# Patient Record
Sex: FEMALE | Race: WHITE | NOT HISPANIC OR LATINO | Employment: FULL TIME | URBAN - METROPOLITAN AREA
[De-identification: names, ages, dates, MRNs, and addresses within clinical notes are randomized per-mention and may not be internally consistent; named-entity substitution may affect disease eponyms.]

---

## 2018-04-30 LAB — CRC RECOMMENDATION EXT: NORMAL

## 2022-01-04 DIAGNOSIS — Z00.00 ROUTINE GENERAL MEDICAL EXAMINATION AT A HEALTH CARE FACILITY: Primary | ICD-10-CM

## 2022-01-11 ENCOUNTER — HOSPITAL ENCOUNTER (OUTPATIENT)
Dept: CARDIOLOGY | Facility: HOSPITAL | Age: 52
Discharge: HOME | End: 2022-01-11
Attending: INTERNAL MEDICINE

## 2022-01-11 ENCOUNTER — HOSPITAL ENCOUNTER (OUTPATIENT)
Dept: RADIOLOGY | Facility: HOSPITAL | Age: 52
Discharge: HOME | End: 2022-01-11
Attending: INTERNAL MEDICINE

## 2022-01-11 ENCOUNTER — CLINICAL SUPPORT (OUTPATIENT)
Dept: INTERNAL MEDICINE | Facility: CLINIC | Age: 52
End: 2022-01-11

## 2022-01-11 ENCOUNTER — OFFICE VISIT (OUTPATIENT)
Dept: INTERNAL MEDICINE | Facility: CLINIC | Age: 52
End: 2022-01-11

## 2022-01-11 VITALS
SYSTOLIC BLOOD PRESSURE: 128 MMHG | BODY MASS INDEX: 19.88 KG/M2 | HEIGHT: 62 IN | HEART RATE: 60 BPM | WEIGHT: 108 LBS | DIASTOLIC BLOOD PRESSURE: 82 MMHG | TEMPERATURE: 97 F

## 2022-01-11 DIAGNOSIS — Z12.31 ENCOUNTER FOR SCREENING MAMMOGRAM FOR BREAST CANCER: ICD-10-CM

## 2022-01-11 DIAGNOSIS — Z00.00 ROUTINE GENERAL MEDICAL EXAMINATION AT A HEALTH CARE FACILITY: Primary | ICD-10-CM

## 2022-01-11 DIAGNOSIS — Z00.00 ROUTINE GENERAL MEDICAL EXAMINATION AT A HEALTH CARE FACILITY: ICD-10-CM

## 2022-01-11 LAB
ALBUMIN SERPL BCP-MCNC: 4 G/DL (ref 3.5–5.2)
ALP SERPL-CCNC: 59 U/L (ref 55–135)
ALT SERPL W/O P-5'-P-CCNC: 19 U/L (ref 10–44)
ANION GAP SERPL CALC-SCNC: 9 MMOL/L (ref 8–16)
AST SERPL-CCNC: 23 U/L (ref 10–40)
BILIRUB SERPL-MCNC: 0.7 MG/DL (ref 0.1–1)
BUN SERPL-MCNC: 16 MG/DL (ref 6–20)
CALCIUM SERPL-MCNC: 9.5 MG/DL (ref 8.7–10.5)
CHLORIDE SERPL-SCNC: 101 MMOL/L (ref 95–110)
CHOLEST SERPL-MCNC: 181 MG/DL (ref 120–199)
CHOLEST/HDLC SERPL: 2.6 {RATIO} (ref 2–5)
CO2 SERPL-SCNC: 29 MMOL/L (ref 23–29)
CREAT SERPL-MCNC: 0.8 MG/DL (ref 0.5–1.4)
ERYTHROCYTE [DISTWIDTH] IN BLOOD BY AUTOMATED COUNT: 12.2 % (ref 11.5–14.5)
EST. GFR  (AFRICAN AMERICAN): >60 ML/MIN/1.73 M^2
EST. GFR  (NON AFRICAN AMERICAN): >60 ML/MIN/1.73 M^2
ESTIMATED AVG GLUCOSE: 103 MG/DL (ref 68–131)
GLUCOSE SERPL-MCNC: 73 MG/DL (ref 70–110)
HBA1C MFR BLD: 5.2 % (ref 4–5.6)
HCT VFR BLD AUTO: 41.8 % (ref 37–48.5)
HDLC SERPL-MCNC: 69 MG/DL (ref 40–75)
HDLC SERPL: 38.1 % (ref 20–50)
HGB BLD-MCNC: 14.2 G/DL (ref 12–16)
LDLC SERPL CALC-MCNC: 91.8 MG/DL (ref 63–159)
MCH RBC QN AUTO: 31.1 PG (ref 27–31)
MCHC RBC AUTO-ENTMCNC: 34 G/DL (ref 32–36)
MCV RBC AUTO: 92 FL (ref 82–98)
NONHDLC SERPL-MCNC: 112 MG/DL
PLATELET # BLD AUTO: 277 K/UL (ref 150–450)
PMV BLD AUTO: 9.6 FL (ref 9.2–12.9)
POTASSIUM SERPL-SCNC: 3.7 MMOL/L (ref 3.5–5.1)
PROT SERPL-MCNC: 6.9 G/DL (ref 6–8.4)
RBC # BLD AUTO: 4.57 M/UL (ref 4–5.4)
SODIUM SERPL-SCNC: 139 MMOL/L (ref 136–145)
TRIGL SERPL-MCNC: 101 MG/DL (ref 30–150)
TSH SERPL DL<=0.005 MIU/L-ACNC: 1.47 UIU/ML (ref 0.4–4)
WBC # BLD AUTO: 6.4 K/UL (ref 3.9–12.7)

## 2022-01-11 PROCEDURE — 99386 PREV VISIT NEW AGE 40-64: CPT | Mod: S$PBB,,, | Performed by: INTERNAL MEDICINE

## 2022-01-11 PROCEDURE — 99214 OFFICE O/P EST MOD 30 MIN: CPT | Mod: PBBFAC,25 | Performed by: INTERNAL MEDICINE

## 2022-01-11 PROCEDURE — 71046 XR CHEST PA AND LATERAL: ICD-10-PCS | Mod: 26,,, | Performed by: RADIOLOGY

## 2022-01-11 PROCEDURE — 80061 LIPID PANEL: CPT | Performed by: FAMILY MEDICINE

## 2022-01-11 PROCEDURE — 93010 EKG 12-LEAD: ICD-10-PCS | Mod: ,,, | Performed by: INTERNAL MEDICINE

## 2022-01-11 PROCEDURE — 93010 ELECTROCARDIOGRAM REPORT: CPT | Mod: ,,, | Performed by: INTERNAL MEDICINE

## 2022-01-11 PROCEDURE — 84443 ASSAY THYROID STIM HORMONE: CPT | Performed by: FAMILY MEDICINE

## 2022-01-11 PROCEDURE — 87389 HIV-1 AG W/HIV-1&-2 AB AG IA: CPT | Performed by: FAMILY MEDICINE

## 2022-01-11 PROCEDURE — 99386 PR PREVENTIVE VISIT,NEW,40-64: ICD-10-PCS | Mod: S$PBB,,, | Performed by: INTERNAL MEDICINE

## 2022-01-11 PROCEDURE — 99999 PR PBB SHADOW E&M-EST. PATIENT-LVL IV: ICD-10-PCS | Mod: PBBFAC,,, | Performed by: INTERNAL MEDICINE

## 2022-01-11 PROCEDURE — 83036 HEMOGLOBIN GLYCOSYLATED A1C: CPT | Performed by: FAMILY MEDICINE

## 2022-01-11 PROCEDURE — 93005 ELECTROCARDIOGRAM TRACING: CPT

## 2022-01-11 PROCEDURE — 71046 X-RAY EXAM CHEST 2 VIEWS: CPT | Mod: TC

## 2022-01-11 PROCEDURE — 80053 COMPREHEN METABOLIC PANEL: CPT | Performed by: FAMILY MEDICINE

## 2022-01-11 PROCEDURE — 86803 HEPATITIS C AB TEST: CPT | Performed by: FAMILY MEDICINE

## 2022-01-11 PROCEDURE — 99999 PR PBB SHADOW E&M-EST. PATIENT-LVL IV: CPT | Mod: PBBFAC,,, | Performed by: INTERNAL MEDICINE

## 2022-01-11 PROCEDURE — 71046 X-RAY EXAM CHEST 2 VIEWS: CPT | Mod: 26,,, | Performed by: RADIOLOGY

## 2022-01-11 PROCEDURE — 85027 COMPLETE CBC AUTOMATED: CPT | Performed by: FAMILY MEDICINE

## 2022-01-11 RX ORDER — CETIRIZINE HYDROCHLORIDE 10 MG/1
10 TABLET ORAL DAILY
COMMUNITY
End: 2023-11-09

## 2022-01-11 RX ORDER — VALACYCLOVIR HYDROCHLORIDE 1 G/1
2000 TABLET, FILM COATED ORAL
COMMUNITY
Start: 2021-09-03 | End: 2022-08-02

## 2022-01-11 RX ORDER — METHYLPHENIDATE HYDROCHLORIDE 10 MG/1
10 TABLET ORAL 2 TIMES DAILY
COMMUNITY
Start: 2022-01-08 | End: 2022-01-31 | Stop reason: SDUPTHER

## 2022-01-11 RX ORDER — DOXYCYCLINE HYCLATE 75 MG/1
1 TABLET ORAL DAILY
COMMUNITY
Start: 2022-01-06 | End: 2022-05-10

## 2022-01-11 RX ORDER — RIZATRIPTAN BENZOATE 10 MG/1
10 TABLET, ORALLY DISINTEGRATING ORAL
COMMUNITY
Start: 2021-09-07 | End: 2022-07-11 | Stop reason: SDUPTHER

## 2022-01-11 RX ORDER — PROPRANOLOL HYDROCHLORIDE 10 MG/1
10 TABLET ORAL 2 TIMES DAILY PRN
COMMUNITY
Start: 2022-01-05 | End: 2022-02-03 | Stop reason: SDUPTHER

## 2022-01-11 RX ORDER — SULFACETAMIDE SODIUM AND SULFUR 100; 20 MG/G; MG/G
CREAM TOPICAL
COMMUNITY
Start: 2021-08-23 | End: 2022-05-10

## 2022-01-11 RX ORDER — ONDANSETRON HYDROCHLORIDE 8 MG/1
1 TABLET, FILM COATED ORAL EVERY 8 HOURS PRN
COMMUNITY
Start: 2021-04-28 | End: 2022-08-02 | Stop reason: SDUPTHER

## 2022-01-11 RX ORDER — (CALCIUM, MAGNESIUM, POTASSIUM, AND SODIUM OXYBATES) .5; .5; .5; .5 G/ML; G/ML; G/ML; G/ML
3.75 SOLUTION ORAL 2 TIMES DAILY
COMMUNITY
Start: 2021-12-21

## 2022-01-11 RX ORDER — FLUOXETINE 20 MG/1
20 TABLET ORAL DAILY
COMMUNITY
Start: 2022-01-03 | End: 2022-02-03 | Stop reason: SDUPTHER

## 2022-01-11 NOTE — PROGRESS NOTES
"Subjective:      Patient ID: Kayla Lowery is a 51 y.o. female.    Chief Complaint: Executive Health    HPI     It was a pleasure to meet you for your executive health physical.    Your sleep doctor is Dr. Wilson.    Your a 51-year-old with a past medical history of attention deficit disorder, anxiety, hypertension, migraines, narcolepsy, and rosacea, fever blisters.     Your current medications include Valtrex, Maxalt, propranolol, Zofran, Ritalin, fluoxetine, doxycycline, Zyrtec, xywav.    You never smoked.     You have experienced side effects with :  Codeine - nausea  Minocycline  - Vertigo  Doxycycline - Sun sensitivity.   Tobramycin ophthalmic - burning to eyes.         Your family history includes:  Father - unknown health history  Mother - Diabetes, hyperlipidemia, hypertension, parkinson, scoliosis  Siblings - unknown health history  Son - narcolepsy, dyspraxia, dyslexia     Preventative healthcare:  Flu vaccine - reported up to date  Tetanus vaccine - reported up to date  Check with your pharmacy regarding new shingles vaccine.   COVID vaccine - reported up to date  Colonoscopy - reported up to date  Mammogram - ordered  Gyn exam - referral placed.     Review of Systems   Constitutional: Negative for chills and fever.   HENT: Negative for ear pain and sore throat.    Respiratory: Negative for cough.    Cardiovascular: Negative for chest pain.   Gastrointestinal: Negative for abdominal pain and blood in stool.   Genitourinary: Negative for dysuria and hematuria.   Neurological: Negative for seizures and syncope.     Objective:   /82   Pulse 60   Temp 96.7 °F (35.9 °C) (Tympanic)   Ht 5' 2" (1.575 m)   Wt 49 kg (108 lb 0.4 oz)   BMI 19.76 kg/m²     Physical Exam  Constitutional:       General: She is not in acute distress.     Appearance: She is well-developed and well-nourished.   HENT:      Head: Normocephalic and atraumatic.      Mouth/Throat:      Mouth: Oropharynx is clear and moist. "   Eyes:      Extraocular Movements: EOM normal.      Pupils: Pupils are equal, round, and reactive to light.   Neck:      Thyroid: No thyromegaly.      Vascular: No carotid bruit.   Cardiovascular:      Rate and Rhythm: Normal rate and regular rhythm.   Pulmonary:      Breath sounds: Normal breath sounds. No wheezing or rales.   Abdominal:      General: Bowel sounds are normal.      Palpations: Abdomen is soft.      Tenderness: There is no abdominal tenderness.   Musculoskeletal:      Cervical back: Neck supple.   Lymphadenopathy:      Cervical: No cervical adenopathy.   Skin:     General: Skin is warm and dry.   Neurological:      Mental Status: She is alert and oriented to person, place, and time.   Psychiatric:         Mood and Affect: Mood and affect and mood normal.         Behavior: Behavior normal.         Assessment:     1. Routine general medical examination at a health care facility    2. Encounter for screening for malignant neoplasm of prostate    3. Encounter for screening mammogram for breast cancer      Plan:   Routine general medical examination at a health care facility  -     Ambulatory referral/consult to Dermatology; Future; Expected date: 01/18/2022    Encounter for screening for malignant neoplasm of prostate    Encounter for screening mammogram for breast cancer  -     Mammo Digital Screening Bilat w/ Jewel; Future; Expected date: 01/11/2022        Heart healthy diet and reg exercise  HM reviewed  See exec health letter for details.     Problem List Items Addressed This Visit    None     Visit Diagnoses     Routine general medical examination at a health care facility    -  Primary    Relevant Orders    Ambulatory referral/consult to Dermatology    Encounter for screening for malignant neoplasm of prostate        Encounter for screening mammogram for breast cancer        Relevant Orders    Mammo Digital Screening Bilat w/ Jewel          No follow-ups on file.

## 2022-01-12 LAB
HCV AB SERPL QL IA: NEGATIVE
HIV 1+2 AB+HIV1 P24 AG SERPL QL IA: NEGATIVE

## 2022-01-14 ENCOUNTER — PATIENT MESSAGE (OUTPATIENT)
Dept: INTERNAL MEDICINE | Facility: CLINIC | Age: 52
End: 2022-01-14

## 2022-01-28 ENCOUNTER — PATIENT MESSAGE (OUTPATIENT)
Dept: INTERNAL MEDICINE | Facility: CLINIC | Age: 52
End: 2022-01-28
Payer: COMMERCIAL

## 2022-01-28 DIAGNOSIS — R06.89: Primary | ICD-10-CM

## 2022-01-28 DIAGNOSIS — G47.419 PRIMARY NARCOLEPSY WITHOUT CATAPLEXY: ICD-10-CM

## 2022-01-31 ENCOUNTER — PATIENT MESSAGE (OUTPATIENT)
Dept: INTERNAL MEDICINE | Facility: CLINIC | Age: 52
End: 2022-01-31
Payer: COMMERCIAL

## 2022-01-31 RX ORDER — METHYLPHENIDATE HYDROCHLORIDE 10 MG/1
10 TABLET ORAL 2 TIMES DAILY
Qty: 60 TABLET | Refills: 0 | Status: SHIPPED | OUTPATIENT
Start: 2022-01-31 | End: 2022-03-16 | Stop reason: SDUPTHER

## 2022-01-31 RX ORDER — METHYLPHENIDATE HYDROCHLORIDE 18 MG/1
18 TABLET ORAL EVERY MORNING
Qty: 30 TABLET | Refills: 0 | Status: SHIPPED | OUTPATIENT
Start: 2022-01-31 | End: 2022-02-01 | Stop reason: SDUPTHER

## 2022-02-01 ENCOUNTER — OFFICE VISIT (OUTPATIENT)
Dept: DERMATOLOGY | Facility: CLINIC | Age: 52
End: 2022-02-01
Payer: COMMERCIAL

## 2022-02-01 DIAGNOSIS — G47.419 PRIMARY NARCOLEPSY WITHOUT CATAPLEXY: ICD-10-CM

## 2022-02-01 DIAGNOSIS — L71.9 ROSACEA: Primary | ICD-10-CM

## 2022-02-01 PROCEDURE — 99213 OFFICE O/P EST LOW 20 MIN: CPT | Mod: PBBFAC,PO | Performed by: DERMATOLOGY

## 2022-02-01 PROCEDURE — 99999 PR PBB SHADOW E&M-EST. PATIENT-LVL III: CPT | Mod: PBBFAC,,, | Performed by: DERMATOLOGY

## 2022-02-01 PROCEDURE — 99204 PR OFFICE/OUTPT VISIT, NEW, LEVL IV, 45-59 MIN: ICD-10-PCS | Mod: S$GLB,,, | Performed by: DERMATOLOGY

## 2022-02-01 PROCEDURE — 99204 OFFICE O/P NEW MOD 45 MIN: CPT | Mod: S$GLB,,, | Performed by: DERMATOLOGY

## 2022-02-01 PROCEDURE — 99999 PR PBB SHADOW E&M-EST. PATIENT-LVL III: ICD-10-PCS | Mod: PBBFAC,,, | Performed by: DERMATOLOGY

## 2022-02-01 RX ORDER — DOXYCYCLINE 100 MG/1
TABLET ORAL
Qty: 45 TABLET | Refills: 2 | Status: SHIPPED | OUTPATIENT
Start: 2022-02-01 | End: 2022-05-10

## 2022-02-01 NOTE — TELEPHONE ENCOUNTER
No new care gaps identified.  Powered by Tul[employer] by [employer]. Reference number: [id number].   2/01/2022 4:50:28 PM CST

## 2022-02-01 NOTE — PROGRESS NOTES
Subjective:       Patient ID:  Kayla Lowery is a 51 y.o. female who presents for   History of Present Illness: The patient presents with chief complaint of face rash.  Location: cheeks (R>L), nose, chin, forehead  Duration: decades; current flare since October 2021 after trip to [facility] and wore n95 on face  Signs/Symptoms: redness, burning, itching, swelling, scaling, sometimes bumps; reports significant component of swelling/induration at times    Previously seen by outside derm for rosacea. + ocular rosacea and eyelid dermatitis.  Avoiding all triggers  Has not ever been patch tested or biopsied but reports it was discussed    Current treatment   Restarted doxy 75 mg daily in November 2021- tolerating well; previously had photosensitive blistering rash on helices + cervical LAD and GI upset thought to be 2/2 doxy   Mirvaso qam - reports it works well when used  Daily SPF 50   La roche posay toleriane hydrating gentle cleanser  Coconut oil    Prior treatments:  Rhofade qam  Soolantra qpm - did not burn, interested in trying again  topical metronidazole- has been years since used  topical clinda - has been years since used  SSS cream samples - reports it burns  Bactrim in the past  minocycline - reports it causes vertigo  oracea samples  Azelaic acid - has been years, interested in trying again          Review of Systems   Eyes: Positive for eye irritation and eyelid inflammation.   Skin: Positive for itching, rash, dry skin, sun sensitivity, daily sunscreen use and activity-related sunscreen use.        Objective:    Physical Exam   Constitutional: She appears well-developed and well-nourished. No distress.   Neurological: She is alert and oriented to person, place, and time. She is not disoriented.   Psychiatric: She has a normal mood and affect.   Skin:   Areas Examined (abnormalities noted in diagram):   Head / Face Inspection Performed  Nails and Digits Inspection Performed                  Diagram  Legend     Erythematous scaling macule/papule c/w actinic keratosis       Vascular papule c/w angioma      Pigmented verrucoid papule/plaque c/w seborrheic keratosis      Yellow umbilicated papule c/w sebaceous hyperplasia      Irregularly shaped tan macule c/w lentigo     1-2 mm smooth white papules consistent with Milia      Movable subcutaneous cyst with punctum c/w epidermal inclusion cyst      Subcutaneous movable cyst c/w pilar cyst      Firm pink to brown papule c/w dermatofibroma      Pedunculated fleshy papule(s) c/w skin tag(s)      Evenly pigmented macule c/w junctional nevus     Mildly variegated pigmented, slightly irregular-bordered macule c/w mildly atypical nevus      Flesh colored to evenly pigmented papule c/w intradermal nevus       Pink pearly papule/plaque c/w basal cell carcinoma      Erythematous hyperkeratotic cursted plaque c/w SCC      Surgical scar with no sign of skin cancer recurrence      Open and closed comedones      Inflammatory papules and pustules      Verrucoid papule consistent consistent with wart     Erythematous eczematous patches and plaques     Dystrophic onycholytic nail with subungual debris c/w onychomycosis     Umbilicated papule    Erythematous-base heme-crusted tan verrucoid plaque consistent with inflamed seborrheic keratosis     Erythematous Silvery Scaling Plaque c/w Psoriasis     See annotation      Assessment / Plan:        Rosacea  - UC, severe, refractory to doxy 75 mg daily and many topicals in the past; with concern for concomitant ACD.  - set up for patch testing, start 1-2 week course of hydrocortisone 2.5% cream BID (already has at home), increase doxy to 100 mg BID for 2 weeks, and start SkinMedicinals Triple cream  - recommend Vanicream products for cleansing and moisturizing; continue strict daily SPF with physical-only blocker (zinc/titanium)  -     doxycycline monohydrate 100 mg Tab; Take 1 tablet by mouth twice daily for 2 weeks, then decrease to one  tablet daily. Take with food and water, avoid lying down for 1 hour after taking. Avoid the sun.  Dispense: 45 tablet; Refill: 2  -     Patch Testing; Future  - consider checking ROCHELLE, biopsy to eval for CTD  - consider low dose isotretinoin    - doxycycline: Side effect profile of doxy reviewed including increased sun sensitivity and upset stomach, esophageal inflammation.  Patient was instructed to take with food and water and to avoid lying down for 1 hour after taking. Patient was instructed to not become pregnant while on medication to effects on dental development in fetus; she acknowledged understanding of risks involved.              Azelaic Acid: 15%  Ivermectin: 1%  Metronidazole: 1%  Vehicle: Cream  4    F/u next week for patch testing          LOS NUMBER AND COMPLEXITY OF PROBLEMS    COMPLEXITY OF DATA RISK TOTAL TIME (m)   65657  86992 [] 1 self-limited or minor problem [x] Minimal to none [] No treatment recommended or patient to monitor. Reassurance.  15-29  10-19   02921  68269 Low  [] 2 or more self limited or minor problems  [] 1 stable chronic illness  [] 1 acute, uncomplicated illness or injury Limited (2)  [] Prior external notes from each unique source  [] Review result of each unique test  [] Order each unique test  OR [] Independent historian Low  []  OTC medications   []  Discussed/Decision for minor skin surgery (no risk factors) 30-44 20-29   77656  93204 Moderate  [x]  1 or more chronic unstable illness (not at goal or progression or exacerbation) or SE of treatment  []  2 or more stable chronic illnesses  []  1 acute illness with systemic symptoms  []  1 acute complicated injury  []  1 undiagnosed new problem with uncertain prognosis Moderate (1/3 below)  []  3 or more data items        *Now includes independent historian  []  Independent interpretation of a test  []  Discuss management/test with another provider Moderate  [x]  Prescription drug mgmt  []  Discussed/Decision for Minor  surgery with risk factors  []  Mgmt limited by social determinates 45-59  30-39   07958  01016 High  []  1 or more chronic illness with severe exacerbation, progression or SE of treatment  []  1 acute or chronic illness/injury that poses a threat to life or bodily function Extensive (2/3 below)  []  3 or more data items        *Now includes independent historian.  []  Independent interpretation of a test  []  Discuss management/test with another provider High  []  Major surgery with risk discussed  []  Drug therapy requiring intensive monitoring for toxicity  []  Hospitalization  []  Decision for DNR 60-74  40-54

## 2022-02-02 RX ORDER — METHYLPHENIDATE HYDROCHLORIDE 18 MG/1
18 TABLET ORAL EVERY MORNING
Qty: 30 TABLET | Refills: 0 | Status: SHIPPED | OUTPATIENT
Start: 2022-02-02 | End: 2022-03-04 | Stop reason: SDUPTHER

## 2022-02-03 RX ORDER — FLUOXETINE 20 MG/1
20 TABLET ORAL DAILY
Qty: 90 TABLET | Refills: 1 | Status: SHIPPED | OUTPATIENT
Start: 2022-02-03 | End: 2022-08-02 | Stop reason: SDUPTHER

## 2022-02-03 RX ORDER — PROPRANOLOL HYDROCHLORIDE 10 MG/1
10 TABLET ORAL 2 TIMES DAILY PRN
Qty: 90 TABLET | Refills: 1 | Status: SHIPPED | OUTPATIENT
Start: 2022-02-03 | End: 2022-05-31 | Stop reason: SDUPTHER

## 2022-02-03 NOTE — TELEPHONE ENCOUNTER
No new care gaps identified.  Powered by [employer] by [employer]. Reference number: [id number].   2/03/2022 8:35:41 AM CST

## 2022-02-07 ENCOUNTER — CLINICAL SUPPORT (OUTPATIENT)
Dept: DERMATOLOGY | Facility: CLINIC | Age: 52
End: 2022-02-07
Payer: COMMERCIAL

## 2022-02-07 ENCOUNTER — PATIENT MESSAGE (OUTPATIENT)
Dept: INTERNAL MEDICINE | Facility: CLINIC | Age: 52
End: 2022-02-07
Payer: COMMERCIAL

## 2022-02-07 VITALS — DIASTOLIC BLOOD PRESSURE: 68 MMHG | SYSTOLIC BLOOD PRESSURE: 168 MMHG

## 2022-02-07 DIAGNOSIS — L71.9 ROSACEA: Primary | ICD-10-CM

## 2022-02-07 PROCEDURE — 99999 PR PBB SHADOW E&M-EST. PATIENT-LVL III: ICD-10-PCS | Mod: PBBFAC,,,

## 2022-02-07 PROCEDURE — 99999 PR PBB SHADOW E&M-EST. PATIENT-LVL III: CPT | Mod: PBBFAC,,,

## 2022-02-07 PROCEDURE — 99213 OFFICE O/P EST LOW 20 MIN: CPT | Mod: PBBFAC,PO

## 2022-02-08 NOTE — TELEPHONE ENCOUNTER
I have never completed Network exception forms. I  can look at form to see if I can be of help. She can also provide anything from Dr. Wilson she feels would be helpful. I see she has Dr. Ahumada as her primary care provider. Dr. ahumada's office is on [facility]. Is she planning to make an appt with Dr. Ahumada?

## 2022-02-09 ENCOUNTER — CLINICAL SUPPORT (OUTPATIENT)
Dept: DERMATOLOGY | Facility: CLINIC | Age: 52
End: 2022-02-09
Payer: COMMERCIAL

## 2022-02-09 ENCOUNTER — PATIENT MESSAGE (OUTPATIENT)
Dept: DERMATOLOGY | Facility: CLINIC | Age: 52
End: 2022-02-09

## 2022-02-09 DIAGNOSIS — L71.9 ROSACEA: Primary | ICD-10-CM

## 2022-02-09 PROCEDURE — 99999 PR PBB SHADOW E&M-EST. PATIENT-LVL III: ICD-10-PCS | Mod: PBBFAC,,,

## 2022-02-09 PROCEDURE — 99999 PR PBB SHADOW E&M-EST. PATIENT-LVL III: CPT | Mod: PBBFAC,,,

## 2022-02-09 NOTE — PROGRESS NOTES
Patch testing was performed on Kayla L Lowery using standard technique. Tests were read after 48 hours.     Interpretation:  5,17, and 29    Test Results       Test Administration Information

## 2022-02-10 ENCOUNTER — PATIENT MESSAGE (OUTPATIENT)
Dept: INTERNAL MEDICINE | Facility: CLINIC | Age: 52
End: 2022-02-10
Payer: COMMERCIAL

## 2022-02-11 ENCOUNTER — TELEPHONE (OUTPATIENT)
Dept: INTERNAL MEDICINE | Facility: CLINIC | Age: 52
End: 2022-02-11
Payer: COMMERCIAL

## 2022-02-11 ENCOUNTER — CLINICAL SUPPORT (OUTPATIENT)
Dept: DERMATOLOGY | Facility: CLINIC | Age: 52
End: 2022-02-11
Payer: COMMERCIAL

## 2022-02-11 ENCOUNTER — PATIENT MESSAGE (OUTPATIENT)
Dept: INTERNAL MEDICINE | Facility: CLINIC | Age: 52
End: 2022-02-11
Payer: COMMERCIAL

## 2022-02-11 ENCOUNTER — TELEPHONE (OUTPATIENT)
Dept: PAIN MEDICINE | Facility: CLINIC | Age: 52
End: 2022-02-11
Payer: COMMERCIAL

## 2022-02-11 ENCOUNTER — OFFICE VISIT (OUTPATIENT)
Dept: DERMATOLOGY | Facility: CLINIC | Age: 52
End: 2022-02-11
Payer: COMMERCIAL

## 2022-02-11 DIAGNOSIS — L71.9 ROSACEA: Primary | ICD-10-CM

## 2022-02-11 DIAGNOSIS — L23.9 ALLERGIC CONTACT DERMATITIS, UNSPECIFIED TRIGGER: ICD-10-CM

## 2022-02-11 PROCEDURE — 99999 PR PBB SHADOW E&M-EST. PATIENT-LVL II: CPT | Mod: PBBFAC,,, | Performed by: DERMATOLOGY

## 2022-02-11 PROCEDURE — 1159F MED LIST DOCD IN RCRD: CPT | Mod: CPTII,S$GLB,, | Performed by: DERMATOLOGY

## 2022-02-11 PROCEDURE — 99214 PR OFFICE/OUTPT VISIT, EST, LEVL IV, 30-39 MIN: ICD-10-PCS | Mod: S$GLB,,, | Performed by: DERMATOLOGY

## 2022-02-11 PROCEDURE — 1160F PR REVIEW ALL MEDS BY PRESCRIBER/CLIN PHARMACIST DOCUMENTED: ICD-10-PCS | Mod: CPTII,S$GLB,, | Performed by: DERMATOLOGY

## 2022-02-11 PROCEDURE — 99214 OFFICE O/P EST MOD 30 MIN: CPT | Mod: S$GLB,,, | Performed by: DERMATOLOGY

## 2022-02-11 PROCEDURE — 99999 PR PBB SHADOW E&M-EST. PATIENT-LVL II: ICD-10-PCS | Mod: PBBFAC,,, | Performed by: DERMATOLOGY

## 2022-02-11 PROCEDURE — 1160F RVW MEDS BY RX/DR IN RCRD: CPT | Mod: CPTII,S$GLB,, | Performed by: DERMATOLOGY

## 2022-02-11 PROCEDURE — 1159F PR MEDICATION LIST DOCUMENTED IN MEDICAL RECORD: ICD-10-PCS | Mod: CPTII,S$GLB,, | Performed by: DERMATOLOGY

## 2022-02-11 PROCEDURE — 3044F HG A1C LEVEL LT 7.0%: CPT | Mod: CPTII,S$GLB,, | Performed by: DERMATOLOGY

## 2022-02-11 PROCEDURE — 3044F PR MOST RECENT HEMOGLOBIN A1C LEVEL <7.0%: ICD-10-PCS | Mod: CPTII,S$GLB,, | Performed by: DERMATOLOGY

## 2022-02-11 NOTE — TELEPHONE ENCOUNTER
----- Message from Regi Moreno sent at 2/11/2022  4:18 PM CST -----  Contact: Kayla Garza would like a call back, please call her at [phone] or [phone]

## 2022-02-11 NOTE — PROGRESS NOTES
Subjective:       Patient ID:  Kayla Lowery is a 51 y.o. female who presents for No chief complaint on file.    Last seen 2/1/22 for initial eval of severe rosacea flare +/- ACD (see below)- continued Mirvaso daily, started 1-2 week course of hydrocortisone 2.5% cream BID (already has at home), increased doxy to 100 mg BID for 2 weeks, and started SkinMedicinals Triple cream, Vanicream products for cleansing and moisturizing, strict daily SPF with physical-only blocker (zinc/titanium), and set up for patch testing.      Here today for final patch test read.  Notes overall improvement but still with some red scaly itchy/burning patches on the cheeks, chin, forehead.    MA noted positives to 5, 17, 29 on initial read Wednesday.      HPI from initial eval 2/1/22:  History of Present Illness: The patient presents with chief complaint of face rash.  Location: cheeks (R>L), nose, chin, forehead  Duration: decades; current flare since October 2021 after trip to [facility] and wore n95 on face  Signs/Symptoms: redness, burning, itching, swelling, scaling, sometimes bumps; reports significant component of swelling/induration at times    Previously seen by outside derm for rosacea. + ocular rosacea and eyelid dermatitis.  Avoiding all triggers  Has not ever been patch tested or biopsied but reports it was discussed    Current treatment   Restarted doxy 75 mg daily in November 2021- tolerating well; previously had photosensitive blistering rash on helices + cervical LAD and GI upset thought to be 2/2 doxy   Mirvaso qam - reports it works well when used  Daily SPF 50   La roche posay toleriane hydrating gentle cleanser  Coconut oil    Prior treatments:  Rhofade qam  Soolantra qpm - did not burn, interested in trying again  topical metronidazole- has been years since used  topical clinda - has been years since used  SSS cream samples - reports it burns  Bactrim in the past  minocycline - reports it causes vertigo  oracea  samples  Azelaic acid - has been years, interested in trying again          Review of Systems   Eyes: Positive for eye irritation and eyelid inflammation.   Skin: Positive for itching, rash, dry skin, sun sensitivity, daily sunscreen use and activity-related sunscreen use.        Objective:    Physical Exam   Constitutional: She appears well-developed and well-nourished. No distress.   Neurological: She is alert and oriented to person, place, and time. She is not disoriented.   Psychiatric: She has a normal mood and affect.   Skin:   Areas Examined (abnormalities noted in diagram):   Head / Face Inspection Performed  Nails and Digits Inspection Performed                  Diagram Legend     Erythematous scaling macule/papule c/w actinic keratosis       Vascular papule c/w angioma      Pigmented verrucoid papule/plaque c/w seborrheic keratosis      Yellow umbilicated papule c/w sebaceous hyperplasia      Irregularly shaped tan macule c/w lentigo     1-2 mm smooth white papules consistent with Milia      Movable subcutaneous cyst with punctum c/w epidermal inclusion cyst      Subcutaneous movable cyst c/w pilar cyst      Firm pink to brown papule c/w dermatofibroma      Pedunculated fleshy papule(s) c/w skin tag(s)      Evenly pigmented macule c/w junctional nevus     Mildly variegated pigmented, slightly irregular-bordered macule c/w mildly atypical nevus      Flesh colored to evenly pigmented papule c/w intradermal nevus       Pink pearly papule/plaque c/w basal cell carcinoma      Erythematous hyperkeratotic cursted plaque c/w SCC      Surgical scar with no sign of skin cancer recurrence      Open and closed comedones      Inflammatory papules and pustules      Verrucoid papule consistent consistent with wart     Erythematous eczematous patches and plaques     Dystrophic onycholytic nail with subungual debris c/w onychomycosis     Umbilicated papule    Erythematous-base heme-crusted tan verrucoid plaque consistent  with inflamed seborrheic keratosis     Erythematous Silvery Scaling Plaque c/w Psoriasis     See annotation      Assessment / Plan:        Rosacea  - UC, severe, refractory to doxy 75 mg daily and many topicals in the past; with concern for concomitant ACD (final patch read today - see below)  - overall improved but still symptomatic after 10 days of increased doxy to 100 mg BID and hydrocortisone 2.5 % cream BID. Complete full 2 weeks of this, then decrease doxy to 100 mg daily and stop hydrocortisone. Continue Mirvaso, SkinMed Triple cream, Vanicream products for cleansing/moisturizing, strict daily SPF.  Ensure all products are on Andreas safe list (see below).  - consider checking ROCHELLE, biopsy to eval for CTD  - consider Seysara, low dose isotretinoin     - doxycycline: Side effect profile of doxy reviewed including increased sun sensitivity and upset stomach, esophageal inflammation.  Patient was instructed to take with food and water and to avoid lying down for 1 hour after taking. Patient was instructed to not become pregnant while on medication to effects on dental development in fetus; she acknowledged understanding of risks involved.             Allergic contact dermatitis, unspecified trigger  - irritant reactions noted on initial read to imidazolidinyl urea;  Cl +ME - isothiazolinone, and jac mix  - positive allergic reaction today noted only to gold sodium thiosulfate (+)  - discussed and gave printed info on both irritants and allergen  - sent email with [employer] safe list pdf            Follow up in about 8-12 weeks

## 2022-02-11 NOTE — TELEPHONE ENCOUNTER
Calling on behalf of Edna Pearson because pt is a special need pt.  I inform her that is out of clinic today and wont return until Monday. I will send a message to  staff box so he can recive refill he gabapentin (NEURONTIN) 300 MG. Pt repasenative also stated the she did not pickup her las Michael refill in 1/26/2022 because pt ws to late in picking I tupFrank [place]   Medical Assistant

## 2022-02-15 ENCOUNTER — PATIENT MESSAGE (OUTPATIENT)
Dept: DERMATOLOGY | Facility: CLINIC | Age: 52
End: 2022-02-15
Payer: COMMERCIAL

## 2022-02-16 ENCOUNTER — PATIENT MESSAGE (OUTPATIENT)
Dept: DERMATOLOGY | Facility: CLINIC | Age: 52
End: 2022-02-16
Payer: COMMERCIAL

## 2022-02-16 DIAGNOSIS — L71.9 ROSACEA: Primary | ICD-10-CM

## 2022-02-17 DIAGNOSIS — R23.2 FLUSHING: ICD-10-CM

## 2022-02-17 DIAGNOSIS — L23.9 ALLERGIC CONTACT DERMATITIS, UNSPECIFIED TRIGGER: Primary | ICD-10-CM

## 2022-02-17 RX ORDER — PREDNISONE 10 MG/1
TABLET ORAL
Qty: 40 TABLET | Refills: 0 | Status: SHIPPED | OUTPATIENT
Start: 2022-02-17 | End: 2022-05-10

## 2022-02-18 ENCOUNTER — LAB VISIT (OUTPATIENT)
Dept: LAB | Facility: HOSPITAL | Age: 52
End: 2022-02-18
Attending: DERMATOLOGY
Payer: COMMERCIAL

## 2022-02-18 ENCOUNTER — PATIENT MESSAGE (OUTPATIENT)
Dept: DERMATOLOGY | Facility: CLINIC | Age: 52
End: 2022-02-18
Payer: COMMERCIAL

## 2022-02-18 DIAGNOSIS — R23.2 FLUSHING: ICD-10-CM

## 2022-02-18 PROCEDURE — 86038 ANTINUCLEAR ANTIBODIES: CPT | Performed by: DERMATOLOGY

## 2022-02-18 PROCEDURE — 36415 COLL VENOUS BLD VENIPUNCTURE: CPT | Performed by: DERMATOLOGY

## 2022-02-18 PROCEDURE — 83520 IMMUNOASSAY QUANT NOS NONAB: CPT | Performed by: DERMATOLOGY

## 2022-02-21 LAB
ANA SER QL IF: NORMAL
TRYPTASE LEVEL: 4.1 NG/ML

## 2022-02-22 ENCOUNTER — TELEPHONE (OUTPATIENT)
Dept: INTERNAL MEDICINE | Facility: CLINIC | Age: 52
End: 2022-02-22
Payer: COMMERCIAL

## 2022-02-22 ENCOUNTER — PATIENT MESSAGE (OUTPATIENT)
Dept: INTERNAL MEDICINE | Facility: CLINIC | Age: 52
End: 2022-02-22
Payer: COMMERCIAL

## 2022-02-22 NOTE — TELEPHONE ENCOUNTER
Spoke with pt and she stated that she would like me to try faxing her paperwork again. She will upload it to the portal and I will fax it

## 2022-02-22 NOTE — TELEPHONE ENCOUNTER
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 She is trying to get paperwork faxed to [employer] re her employment. She states MA keeps telling her paperwork was faxed 2/10/22 but Dr Lowery is saying fiordaliza hernandes is telling her it was not received. Pt states she was told by Dr Patel to contact him if she has any issues and she is requesting that Dr Patel call her back directly.

## 2022-02-28 ENCOUNTER — TELEPHONE (OUTPATIENT)
Dept: PULMONOLOGY | Facility: CLINIC | Age: 52
End: 2022-02-28
Payer: COMMERCIAL

## 2022-02-28 NOTE — TELEPHONE ENCOUNTER
----- Message from Belgica Goff sent at 2/28/2022  7:55 AM CST -----  Type:  Sooner Apoointment Request    Caller is requesting a sooner appointment.  Caller declined first available appointment listed below.  Caller will not accept being placed on the waitlist and is requesting a message be sent to doctor.  Name of Caller:Joseline/PRAVEEN  When is the first available appointment?na  Symptoms:Np,Sleep Problem  Would the patient rather a call back or a response via [email or web address]?call back  Best Call Back Number:[phone]  Additional Information: na

## 2022-02-28 NOTE — TELEPHONE ENCOUNTER
----- Message from Belgica Goff sent at 2/28/2022  7:55 AM CST -----  Type:  Sooner Apoointment Request    Caller is requesting a sooner appointment.  Caller declined first available appointment listed below.  Caller will not accept being placed on the waitlist and is requesting a message be sent to doctor.  Name of Caller:Joseline/PRAVEEN  When is the first available appointment?na  Symptoms:Np,Sleep Problem  Would the patient rather a call back or a response via [facility]?call back  Best Call Back Number:[phone]  Additional Information: na

## 2022-02-28 NOTE — TELEPHONE ENCOUNTER
Spoke to diego from [facility]. Pt is wanting to know if Dr. Rachel prescribed xywav before transferring providers.

## 2022-03-04 DIAGNOSIS — G47.419 PRIMARY NARCOLEPSY WITHOUT CATAPLEXY: ICD-10-CM

## 2022-03-04 NOTE — TELEPHONE ENCOUNTER
No new care gaps identified.  Powered by bookletmobile by [employer]. Reference number: [id number].   3/04/2022 6:17:29 AM CST

## 2022-03-06 RX ORDER — METHYLPHENIDATE HYDROCHLORIDE 18 MG/1
18 TABLET ORAL EVERY MORNING
Qty: 30 TABLET | Refills: 0 | Status: SHIPPED | OUTPATIENT
Start: 2022-03-06 | End: 2022-04-05 | Stop reason: SDUPTHER

## 2022-03-08 ENCOUNTER — HOSPITAL ENCOUNTER (OUTPATIENT)
Dept: RADIOLOGY | Facility: HOSPITAL | Age: 52
Discharge: HOME | End: 2022-03-08
Attending: INTERNAL MEDICINE
Payer: COMMERCIAL

## 2022-03-08 VITALS — WEIGHT: 108 LBS | HEIGHT: 62 IN | BODY MASS INDEX: 19.88 KG/M2

## 2022-03-08 DIAGNOSIS — Z12.31 ENCOUNTER FOR SCREENING MAMMOGRAM FOR BREAST CANCER: ICD-10-CM

## 2022-03-08 PROCEDURE — 77067 MAMMO DIGITAL SCREENING BILAT WITH TOMO: ICD-10-PCS | Mod: 26,,, | Performed by: RADIOLOGY

## 2022-03-08 PROCEDURE — 77067 SCR MAMMO BI INCL CAD: CPT | Mod: 26,,, | Performed by: RADIOLOGY

## 2022-03-08 PROCEDURE — 77063 MAMMO DIGITAL SCREENING BILAT WITH TOMO: ICD-10-PCS | Mod: 26,,, | Performed by: RADIOLOGY

## 2022-03-08 PROCEDURE — 77063 BREAST TOMOSYNTHESIS BI: CPT | Mod: 26,,, | Performed by: RADIOLOGY

## 2022-03-08 PROCEDURE — 77067 SCR MAMMO BI INCL CAD: CPT | Mod: TC

## 2022-03-08 PROCEDURE — 77063 BREAST TOMOSYNTHESIS BI: CPT | Mod: TC

## 2022-03-16 ENCOUNTER — PATIENT MESSAGE (OUTPATIENT)
Dept: INTERNAL MEDICINE | Facility: CLINIC | Age: 52
End: 2022-03-16
Payer: COMMERCIAL

## 2022-03-16 DIAGNOSIS — G47.419 PRIMARY NARCOLEPSY WITHOUT CATAPLEXY: ICD-10-CM

## 2022-03-16 RX ORDER — METHYLPHENIDATE HYDROCHLORIDE 18 MG/1
18 TABLET ORAL EVERY MORNING
Qty: 30 TABLET | Refills: 0 | OUTPATIENT
Start: 2022-03-16 | End: 2022-04-15

## 2022-03-16 RX ORDER — METHYLPHENIDATE HYDROCHLORIDE 10 MG/1
10 TABLET ORAL 2 TIMES DAILY
Qty: 60 TABLET | Refills: 0 | Status: SHIPPED | OUTPATIENT
Start: 2022-03-16 | End: 2022-04-18 | Stop reason: SDUPTHER

## 2022-03-16 NOTE — TELEPHONE ENCOUNTER
No new care gaps identified.  Powered by [employer] by [employer]. Reference number: [id number].   3/16/2022 11:27:08 AM CDT

## 2022-03-17 ENCOUNTER — PATIENT MESSAGE (OUTPATIENT)
Dept: INTERNAL MEDICINE | Facility: CLINIC | Age: 52
End: 2022-03-17
Payer: COMMERCIAL

## 2022-03-18 ENCOUNTER — PATIENT MESSAGE (OUTPATIENT)
Dept: ADMINISTRATIVE | Facility: HOSPITAL | Age: 52
End: 2022-03-18
Payer: COMMERCIAL

## 2022-03-21 ENCOUNTER — PATIENT OUTREACH (OUTPATIENT)
Dept: ADMINISTRATIVE | Facility: OTHER | Age: 52
End: 2022-03-21
Payer: COMMERCIAL

## 2022-03-21 NOTE — PROGRESS NOTES
Health Maintenance Due   Topic Date Due    Colorectal Cancer Screening  Never done    Shingles Vaccine (1 of 2) Never done    Cervical Cancer Screening  04/30/2022     Updates were requested from care everywhere.  Chart was reviewed for overdue Proactive [facility] Encounters (NEREYDA) topics (CRS, Breast Cancer Screening, Eye exam)  Health Maintenance has been updated.  LINKS immunization registry triggered.  Immunizations were reconciled.

## 2022-03-22 ENCOUNTER — OFFICE VISIT (OUTPATIENT)
Dept: OPHTHALMOLOGY | Facility: CLINIC | Age: 52
End: 2022-03-22
Payer: COMMERCIAL

## 2022-03-22 DIAGNOSIS — H52.4 MYOPIA WITH ASTIGMATISM AND PRESBYOPIA, BILATERAL: ICD-10-CM

## 2022-03-22 DIAGNOSIS — H52.13 MYOPIA WITH ASTIGMATISM AND PRESBYOPIA, BILATERAL: ICD-10-CM

## 2022-03-22 DIAGNOSIS — H52.203 MYOPIA WITH ASTIGMATISM AND PRESBYOPIA, BILATERAL: ICD-10-CM

## 2022-03-22 DIAGNOSIS — L71.8 OCULAR ROSACEA: ICD-10-CM

## 2022-03-22 DIAGNOSIS — H04.129 DRY EYE: Primary | ICD-10-CM

## 2022-03-22 PROCEDURE — 92004 PR EYE EXAM, NEW PATIENT,COMPREHESV: ICD-10-PCS | Mod: S$GLB,,, | Performed by: OPTOMETRIST

## 2022-03-22 PROCEDURE — 92015 PR REFRACTION: ICD-10-PCS | Mod: S$GLB,,, | Performed by: OPTOMETRIST

## 2022-03-22 PROCEDURE — 1160F RVW MEDS BY RX/DR IN RCRD: CPT | Mod: CPTII,S$GLB,, | Performed by: OPTOMETRIST

## 2022-03-22 PROCEDURE — 3044F PR MOST RECENT HEMOGLOBIN A1C LEVEL <7.0%: ICD-10-PCS | Mod: CPTII,S$GLB,, | Performed by: OPTOMETRIST

## 2022-03-22 PROCEDURE — 92015 DETERMINE REFRACTIVE STATE: CPT | Mod: S$GLB,,, | Performed by: OPTOMETRIST

## 2022-03-22 PROCEDURE — 1160F PR REVIEW ALL MEDS BY PRESCRIBER/CLIN PHARMACIST DOCUMENTED: ICD-10-PCS | Mod: CPTII,S$GLB,, | Performed by: OPTOMETRIST

## 2022-03-22 PROCEDURE — 92004 COMPRE OPH EXAM NEW PT 1/>: CPT | Mod: S$GLB,,, | Performed by: OPTOMETRIST

## 2022-03-22 PROCEDURE — 99999 PR PBB SHADOW E&M-EST. PATIENT-LVL III: ICD-10-PCS | Mod: PBBFAC,,, | Performed by: OPTOMETRIST

## 2022-03-22 PROCEDURE — 3044F HG A1C LEVEL LT 7.0%: CPT | Mod: CPTII,S$GLB,, | Performed by: OPTOMETRIST

## 2022-03-22 PROCEDURE — 1159F PR MEDICATION LIST DOCUMENTED IN MEDICAL RECORD: ICD-10-PCS | Mod: CPTII,S$GLB,, | Performed by: OPTOMETRIST

## 2022-03-22 PROCEDURE — 99999 PR PBB SHADOW E&M-EST. PATIENT-LVL III: CPT | Mod: PBBFAC,,, | Performed by: OPTOMETRIST

## 2022-03-22 PROCEDURE — 1159F MED LIST DOCD IN RCRD: CPT | Mod: CPTII,S$GLB,, | Performed by: OPTOMETRIST

## 2022-03-22 NOTE — PROGRESS NOTES
HPI     Last eye exam 2020  1. Maternal Aunt has Macular Degeneration   Patient states Rosacea affecting vision.  Itchy, burning, and dry eyes, using otc tears.  Wear SVL computer & distance glasses.     Last edited by Brandy Deras on 3/22/2022  2:48 PM. (History)            Assessment /Plan     For exam results, see Encounter Report.    Dry eye  Ocular rosacea  No active disease at this time-doing well with po doxy for rosacea by derm  Mild spk  Recommended refresh bid-tid OU  Pt to let us know if symptoms persist-consider topical steroid for flare up if needed    Myopia with astigmatism and presbyopia, bilateral  Eyeglass Final Rx     Eyeglass Final Rx       Sphere Cylinder Axis    Right -0.25 +0.50 180    Left -2.25 +0.25 165    Type: SVL    Expiration Date: 3/22/2023          Eyeglass Final Rx #2       Sphere Cylinder Axis    Right +0.75 +0.50 180    Left -1.25 +0.25 165    Type: SVL computer    Expiration Date: 3/22/2023    Comments: Signs are correct                  RTC 1 yr for undilated eye exam and gOCT or PRN if any problems.   Discussed above and answered questions.

## 2022-04-05 DIAGNOSIS — G47.419 PRIMARY NARCOLEPSY WITHOUT CATAPLEXY: ICD-10-CM

## 2022-04-05 NOTE — TELEPHONE ENCOUNTER
No new care gaps identified.  Powered by [employer] by [employer]. Reference number: [id number].   4/05/2022 5:51:13 AM CDT

## 2022-04-06 RX ORDER — METHYLPHENIDATE HYDROCHLORIDE 18 MG/1
18 TABLET ORAL EVERY MORNING
Qty: 30 TABLET | Refills: 0 | Status: SHIPPED | OUTPATIENT
Start: 2022-04-06 | End: 2022-05-06 | Stop reason: SDUPTHER

## 2022-04-07 ENCOUNTER — PATIENT MESSAGE (OUTPATIENT)
Dept: INTERNAL MEDICINE | Facility: CLINIC | Age: 52
End: 2022-04-07
Payer: COMMERCIAL

## 2022-04-18 DIAGNOSIS — G47.419 PRIMARY NARCOLEPSY WITHOUT CATAPLEXY: ICD-10-CM

## 2022-04-18 NOTE — TELEPHONE ENCOUNTER
No new care gaps identified.  Powered by [employer] by [employer]. Reference number: [id number].   4/18/2022 11:28:53 AM CDT

## 2022-04-19 RX ORDER — METHYLPHENIDATE HYDROCHLORIDE 10 MG/1
10 TABLET ORAL 2 TIMES DAILY
Qty: 60 TABLET | Refills: 0 | Status: SHIPPED | OUTPATIENT
Start: 2022-04-19 | End: 2022-05-17 | Stop reason: SDUPTHER

## 2022-04-21 ENCOUNTER — OFFICE VISIT (OUTPATIENT)
Dept: DERMATOLOGY | Facility: CLINIC | Age: 52
End: 2022-04-21
Payer: COMMERCIAL

## 2022-04-21 DIAGNOSIS — L71.9 ROSACEA: Primary | ICD-10-CM

## 2022-04-21 PROCEDURE — 1160F RVW MEDS BY RX/DR IN RCRD: CPT | Mod: CPTII,S$GLB,, | Performed by: STUDENT IN AN ORGANIZED HEALTH CARE EDUCATION/TRAINING PROGRAM

## 2022-04-21 PROCEDURE — 99999 PR PBB SHADOW E&M-EST. PATIENT-LVL III: ICD-10-PCS | Mod: PBBFAC,,, | Performed by: STUDENT IN AN ORGANIZED HEALTH CARE EDUCATION/TRAINING PROGRAM

## 2022-04-21 PROCEDURE — 99214 OFFICE O/P EST MOD 30 MIN: CPT | Mod: S$GLB,,, | Performed by: STUDENT IN AN ORGANIZED HEALTH CARE EDUCATION/TRAINING PROGRAM

## 2022-04-21 PROCEDURE — 1159F PR MEDICATION LIST DOCUMENTED IN MEDICAL RECORD: ICD-10-PCS | Mod: CPTII,S$GLB,, | Performed by: STUDENT IN AN ORGANIZED HEALTH CARE EDUCATION/TRAINING PROGRAM

## 2022-04-21 PROCEDURE — 3044F HG A1C LEVEL LT 7.0%: CPT | Mod: CPTII,S$GLB,, | Performed by: STUDENT IN AN ORGANIZED HEALTH CARE EDUCATION/TRAINING PROGRAM

## 2022-04-21 PROCEDURE — 1160F PR REVIEW ALL MEDS BY PRESCRIBER/CLIN PHARMACIST DOCUMENTED: ICD-10-PCS | Mod: CPTII,S$GLB,, | Performed by: STUDENT IN AN ORGANIZED HEALTH CARE EDUCATION/TRAINING PROGRAM

## 2022-04-21 PROCEDURE — 99999 PR PBB SHADOW E&M-EST. PATIENT-LVL III: CPT | Mod: PBBFAC,,, | Performed by: STUDENT IN AN ORGANIZED HEALTH CARE EDUCATION/TRAINING PROGRAM

## 2022-04-21 PROCEDURE — 1159F MED LIST DOCD IN RCRD: CPT | Mod: CPTII,S$GLB,, | Performed by: STUDENT IN AN ORGANIZED HEALTH CARE EDUCATION/TRAINING PROGRAM

## 2022-04-21 PROCEDURE — 99214 PR OFFICE/OUTPT VISIT, EST, LEVL IV, 30-39 MIN: ICD-10-PCS | Mod: S$GLB,,, | Performed by: STUDENT IN AN ORGANIZED HEALTH CARE EDUCATION/TRAINING PROGRAM

## 2022-04-21 PROCEDURE — 3044F PR MOST RECENT HEMOGLOBIN A1C LEVEL <7.0%: ICD-10-PCS | Mod: CPTII,S$GLB,, | Performed by: STUDENT IN AN ORGANIZED HEALTH CARE EDUCATION/TRAINING PROGRAM

## 2022-04-21 RX ORDER — METRONIDAZOLE 7.5 MG/G
CREAM TOPICAL 2 TIMES DAILY
Qty: 45 G | Refills: 3 | Status: SHIPPED | OUTPATIENT
Start: 2022-04-21 | End: 2023-05-17 | Stop reason: SDUPTHER

## 2022-04-21 RX ORDER — TACROLIMUS 1 MG/G
OINTMENT TOPICAL 2 TIMES DAILY
Qty: 60 G | Refills: 2 | Status: SHIPPED | OUTPATIENT
Start: 2022-04-21

## 2022-04-21 RX ORDER — DOXYCYCLINE 100 MG/1
100 CAPSULE ORAL EVERY 12 HOURS
Qty: 60 CAPSULE | Refills: 2 | Status: SHIPPED | OUTPATIENT
Start: 2022-04-21 | End: 2022-11-17

## 2022-04-21 NOTE — PROGRESS NOTES
Subjective:       Patient ID:  Kayla Lowery is a 52 y.o. female who presents for   Chief Complaint   Patient presents with    Rosacea     Itchy      History of Present Illness: The patient presents for follow up of rosacea, last seen by Dr. Isaac on 2/11/22. Patient recently on a course of oral steroids with initial improvement, however, since getting off medication, has noticed symptoms are starting to return. Currently on doxycycline once daily and using rhofade daily. Is having red plaques on the cheeks, forehead and chin. Recently underwent a patch test where she was positive for gold sodium thiosulfate, with irritant reaction to imidazolidinyl urea;  Cl +ME - isothiazolinone, and jac mix. Has been avoiding with no improvement in rosacea symptoms.     Prior treatments:  Rhofade qam  Soolantra qpm - did not burn, interested in trying again  topical metronidazole- has been years since used  topical clinda - has been years since used  SSS cream samples - reports it burns  Bactrim in the past  minocycline - reports it causes vertigo  oracea samples  Azelaic acid - has been years, interested in trying again      Review of Systems   Constitutional: Negative for fever and chills.        Objective:    Physical Exam   Constitutional: She appears well-developed and well-nourished. No distress.   Neurological: She is alert and oriented to person, place, and time. She is not disoriented.   Psychiatric: She has a normal mood and affect.   Skin:   Areas Examined (abnormalities noted in diagram):   Head / Face Inspection Performed  Neck Inspection Performed  Back Inspection Performed  RUE Inspected  LUE Inspection Performed              Diagram Legend     Erythematous scaling macule/papule c/w actinic keratosis       Vascular papule c/w angioma      Pigmented verrucoid papule/plaque c/w seborrheic keratosis      Yellow umbilicated papule c/w sebaceous hyperplasia      Irregularly shaped tan macule c/w lentigo     1-2  mm smooth white papules consistent with Milia      Movable subcutaneous cyst with punctum c/w epidermal inclusion cyst      Subcutaneous movable cyst c/w pilar cyst      Firm pink to brown papule c/w dermatofibroma      Pedunculated fleshy papule(s) c/w skin tag(s)      Evenly pigmented macule c/w junctional nevus     Mildly variegated pigmented, slightly irregular-bordered macule c/w mildly atypical nevus      Flesh colored to evenly pigmented papule c/w intradermal nevus       Pink pearly papule/plaque c/w basal cell carcinoma      Erythematous hyperkeratotic cursted plaque c/w SCC      Surgical scar with no sign of skin cancer recurrence      Open and closed comedones      Inflammatory papules and pustules      Verrucoid papule consistent consistent with wart     Erythematous eczematous patches and plaques     Dystrophic onycholytic nail with subungual debris c/w onychomycosis     Umbilicated papule    Erythematous-base heme-crusted tan verrucoid plaque consistent with inflamed seborrheic keratosis     Erythematous Silvery Scaling Plaque c/w Psoriasis     See annotation      Assessment / Plan:        Rosacea - severe, 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 Symptoms currently mild but starting to get worse. After discussion, will restart back doxycycline 2 times daily, add topical tacrolimus and metronidazole to regimen. If still no improvement, may need to consider treatment with isotretinoin.   -     doxycycline (VIBRAMYCIN) 100 MG Cap; Take 1 capsule (100 mg total) by mouth every 12 (twelve) hours.  Dispense: 60 capsule; Refill: 2  -     metronidazole 0.75% (METROCREAM) 0.75 % Crea; Apply topically 2 (two) times daily.  Dispense: 45 g; Refill: 3  -     tacrolimus (PROTOPIC) 0.1 % ointment; Apply topically 2 (two) times daily.  Dispense: 60 g; Refill: 2         Follow up in about 8 weeks (around 6/16/2022).

## 2022-04-25 ENCOUNTER — TELEPHONE (OUTPATIENT)
Dept: PULMONOLOGY | Facility: CLINIC | Age: 52
End: 2022-04-25
Payer: COMMERCIAL

## 2022-04-25 NOTE — PROGRESS NOTES
The patient location is: *HOME  The chief complaint leading to consultation is: NArcolepsy    Visit type: audiovisual    Face to Face time with patient: 14 min  15 minutes of total time spent on the encounter, which includes face to face time and non-face to face time preparing to see the patient (eg, review of tests), Obtaining and/or reviewing separately obtained history, Documenting clinical information in the electronic or other health record, Independently interpreting results (not separately reported) and communicating results to the patient/family/caregiver, or Care coordination (not separately reported).         Each patient to whom he or she provides medical services by telemedicine is:  (1) informed of the relationship between the physician and patient and the respective role of any other health care provider with respect to management of the patient; and (2) notified that he or she may decline to receive medical services by telemedicine and may withdraw from such care at any time.    Notes:                                              Pulmonary Outpatient  Visit     Subjective:       Patient ID: Kayla Lowery is a 52 y.o. female.    Chief Complaint: No chief complaint on file.      Kayla Lowery is 52 y.o.  Physician: [facility]  Dx Narcolepsy followed by Dr Steve Youssef [facility] has narcolepsy  Xywav and methylphenidate  Sleep study was done in 2006:   Displaced here after [facility]  Likely had narcolepsy since 12 years, Had ADHD  Since will fall asleep during tests  Seen psychologist and neurologist  Bed time: 9-0930pm  Wake time: 0430 am  SOL 15 mins  No problems with Meds  Notes reviewed  Had tremor  Sleep paralysis and hallucination Last event  2014    Last event: cannot remember  No MVA     Notes reveiwed  1. Primary narcolepsy without cataplexy     PLAN    Kayla was seen today for narcolepsy without cataplexy.    Diagnoses and all orders for this visit:    Primary narcolepsy without  cataplexy    1. Do Not Drive if you are sleepy!  2. Will continue xywav at 3.75g and 3 grams at night.Will change the dosing with new rx.   3. Continue methylphenidate 18 mg po daily. And ritalin 10 mg BID Prn.    Electronically signed by Rodo Wilson MD at 09/14/2021 1:42 PM CDT                  The following portions of the patient's history were reviewed and updated as appropriate:   She  has a past medical history of ADHD (attention deficit hyperactivity disorder), Anxiety, Hypertension, Migraines, Narcolepsy, and Rosacea.  She does not have any pertinent problems on file.  She  has no past surgical history on file.  Her family history includes Coronary artery disease in her mother; Diabetes in her mother; Hyperlipidemia in her mother; Macular degeneration in her maternal aunt; No Known Problems in her father; Parkinsonism in her mother.  She  reports that she has never smoked. She has never used smokeless tobacco. She reports current alcohol use. She reports that she does not use drugs.  She has a current medication list which includes the following prescription(s): mirvaso, cetirizine, doxycycline, doxycycline hyclate, doxycycline monohydrate, fluoxetine, methylphenidate hcl, methylphenidate hcl, metronidazole 0.75%, ondansetron, oxymetazoline, prednisone, propranolol, rizatriptan, sulfacetamide sodium-sulfur, tacrolimus, valacyclovir, and xywav.  Current Outpatient Medications on File Prior to Visit   Medication Sig Dispense Refill    brimonidine (MIRVASO) 0.33 % Gel Use to rosacea in AM      cetirizine (ZYRTEC) 10 MG tablet Take 10 mg by mouth once daily.      doxycycline (VIBRAMYCIN) 100 MG Cap Take 1 capsule (100 mg total) by mouth every 12 (twelve) hours. 60 capsule 2    doxycycline hyclate 75 mg Tab Take 1 tablet by mouth once daily.      doxycycline monohydrate 100 mg Tab Take 1 tablet by mouth twice daily for 2 weeks, then decrease to one tablet daily. Take with food and water, avoid lying down  for 1 hour after taking. Avoid the sun. (Patient taking differently: Take 1 tablet by mouth twice daily for 2 weeks, then decrease to one tablet daily. Take with food and water, avoid lying down for 1 hour after taking. Avoid the sun.) 45 tablet 2    FLUoxetine 20 MG tablet Take 1 tablet (20 mg total) by mouth once daily. 90 tablet 1    methylphenidate HCl (RITALIN) 10 MG tablet Take 1 tablet (10 mg total) by mouth 2 (two) times daily. 60 tablet 0    methylphenidate HCl 18 MG CR tablet Take 1 tablet (18 mg total) by mouth every morning. 30 tablet 0    metronidazole 0.75% (METROCREAM) 0.75 % Crea Apply topically 2 (two) times daily. 45 g 3    ondansetron (ZOFRAN) 8 MG tablet Take 1 tablet by mouth every 8 (eight) hours as needed.      oxymetazoline (RHOFADE) 1 % Crea Apply daily to affected area 30 g 5    predniSONE (DELTASONE) 10 MG tablet Take 4 tablets by mouth daily with breakfast for 4 days, then 3 tablets daily for 4 days, then 2 tablets daily for 4 days, then 1 tablet daily for 4 days. (Patient taking differently: Take 4 tablets by mouth daily with breakfast for 4 days, then 3 tablets daily for 4 days, then 2 tablets daily for 4 days, then 1 tablet daily for 4 days.) 40 tablet 0    propranoloL (INDERAL) 10 MG tablet Take 1 tablet (10 mg total) by mouth 2 (two) times daily as needed. 90 tablet 1    rizatriptan (MAXALT-MLT) 10 MG disintegrating tablet Take 10 mg by mouth as needed.      sulfacetamide sodium-sulfur 10-2 % Crea as needed.      tacrolimus (PROTOPIC) 0.1 % ointment Apply topically 2 (two) times daily. 60 g 2    valACYclovir (VALTREX) 1000 MG tablet Take 2,000 mg by mouth as needed.      XYWAV 0.5 gram/mL Soln        No current facility-administered medications on file prior to visit.     She is allergic to codeine phosphate, doxycycline monohydrate, minocycline, and tobramycin-lotepred..      Review of Systems   All other systems reviewed and are negative.      Outpatient Encounter  Medications as of 4/26/2022   Medication Sig Dispense Refill    brimonidine (MIRVASO) 0.33 % Gel Use to rosacea in AM      cetirizine (ZYRTEC) 10 MG tablet Take 10 mg by mouth once daily.      doxycycline (VIBRAMYCIN) 100 MG Cap Take 1 capsule (100 mg total) by mouth every 12 (twelve) hours. 60 capsule 2    doxycycline hyclate 75 mg Tab Take 1 tablet by mouth once daily.      doxycycline monohydrate 100 mg Tab Take 1 tablet by mouth twice daily for 2 weeks, then decrease to one tablet daily. Take with food and water, avoid lying down for 1 hour after taking. Avoid the sun. (Patient taking differently: Take 1 tablet by mouth twice daily for 2 weeks, then decrease to one tablet daily. Take with food and water, avoid lying down for 1 hour after taking. Avoid the sun.) 45 tablet 2    FLUoxetine 20 MG tablet Take 1 tablet (20 mg total) by mouth once daily. 90 tablet 1    methylphenidate HCl (RITALIN) 10 MG tablet Take 1 tablet (10 mg total) by mouth 2 (two) times daily. 60 tablet 0    methylphenidate HCl 18 MG CR tablet Take 1 tablet (18 mg total) by mouth every morning. 30 tablet 0    metronidazole 0.75% (METROCREAM) 0.75 % Crea Apply topically 2 (two) times daily. 45 g 3    ondansetron (ZOFRAN) 8 MG tablet Take 1 tablet by mouth every 8 (eight) hours as needed.      oxymetazoline (RHOFADE) 1 % Crea Apply daily to affected area 30 g 5    predniSONE (DELTASONE) 10 MG tablet Take 4 tablets by mouth daily with breakfast for 4 days, then 3 tablets daily for 4 days, then 2 tablets daily for 4 days, then 1 tablet daily for 4 days. (Patient taking differently: Take 4 tablets by mouth daily with breakfast for 4 days, then 3 tablets daily for 4 days, then 2 tablets daily for 4 days, then 1 tablet daily for 4 days.) 40 tablet 0    propranoloL (INDERAL) 10 MG tablet Take 1 tablet (10 mg total) by mouth 2 (two) times daily as needed. 90 tablet 1    rizatriptan (MAXALT-MLT) 10 MG disintegrating tablet Take 10 mg by  mouth as needed.      sulfacetamide sodium-sulfur 10-2 % Crea as needed.      tacrolimus (PROTOPIC) 0.1 % ointment Apply topically 2 (two) times daily. 60 g 2    valACYclovir (VALTREX) 1000 MG tablet Take 2,000 mg by mouth as needed.      XYWAV 0.5 gram/mL Soln        No facility-administered encounter medications on file as of 4/26/2022.       Objective:     Vital Signs (Most Recent)   ]  Wt Readings from Last 2 Encounters:   03/08/22 49 kg (108 lb)   01/11/22 49 kg (108 lb 0.4 oz)       Physical Exam   Constitutional: She appears well-developed and well-nourished.   Nursing note and vitals reviewed.      Laboratory  Lab Results   Component Value Date    WBC 6.40 01/11/2022    RBC 4.57 01/11/2022    HGB 14.2 01/11/2022    HCT 41.8 01/11/2022    MCV 92 01/11/2022    MCH 31.1 (H) 01/11/2022    MCHC 34.0 01/11/2022    RDW 12.2 01/11/2022     01/11/2022    MPV 9.6 01/11/2022    GRAN 2.7 02/06/2007    GRAN 55.9 02/06/2007    LYMPH 35.6 02/06/2007    LYMPH 1.8 02/06/2007    MONO 6.7 02/06/2007    MONO 0.3 02/06/2007    EOS 0.1 02/06/2007    BASO 0.0 02/06/2007    EOSINOPHIL 1.4 02/06/2007    BASOPHIL 0.4 02/06/2007       BMP  Lab Results   Component Value Date     01/11/2022    K 3.7 01/11/2022     01/11/2022    CO2 29 01/11/2022    BUN 16 01/11/2022    CREATININE 0.8 01/11/2022    CALCIUM 9.5 01/11/2022    ANIONGAP 9 01/11/2022    ESTGFRAFRICA >60 01/11/2022    EGFRNONAA >60 01/11/2022    AST 23 01/11/2022    ALT 19 01/11/2022    PROT 6.9 01/11/2022       No results found for: BNP    Lab Results   Component Value Date    TSH 1.470 01/11/2022       No results found for: SEDRATE    No results found for: CRP  No results found for: IGE     No results found for: ASPERGILLUS  No results found for: AFUMIGATUSCL     No results found for: ACE     Diagnostic Results:  I have personally reviewed today the following studies:  [facility]  Report Sleep Study: Multiple  Sleep Latency Test (MSLT)  Patient: Kayla Lowery Clinic Number: [id number]  Date: 2006 Referring: Luana  Ht: 63 in Wt: 112 lbs BMI: 19 : 1970 Age: 36 Sex: Female  Reason for the study: ADHD, R/O Narcolepsy. Daytime drowsiness. Migraine headaches  Medications: Stopped Concerta x 2 weeks, NSAIDs  Multiple sleep latency test (MSLT)(5 naps) was performed in the standard manner with the following results.  Nap Start time Sleep latency(min) REM latency(min)  1 0908 2 -  2 1101 1.1 -  3 1300 6.0 -  4 1500 7.3 -  5 1700 2.3 -  Mean sleep latency = 3.7 min  Sleep onset REM periods (SOREM) = none  Diagnostic Impression  The mean sleep latency of 3.7 minutes is markedly reduced. Coupled with the previous nights polysomnogram these finding would be consistent with  the diagnosis of idiopathic hypersomnia or monosymptomatic narcolepsy. Clinical correlation is recommended.  Recommendation  Follow-up with attending physician. If you have additional questions  please contact Sleep Laboratory.  Morgan Craft MD  Diplomate of the American Board of Sleep Medicine  Narcolepsy ICSD Code: 347      [facility]  Report Sleep Study: Nocturnal polysomnogram  Patient: Kayla Lowery Clinic Number: [id number]  Date: 2006 Referring: Luana  Ht: 63 in Wt: 112 lbs BMI: 19 : 1970 Age: 36 Sex: Female  Reason for the study: ADHD, R/O Narcolepsy. Daytime drowsiness. Migraine headaches  Medications: Stopped Concerta x 2 weeks, NSAIDs  Test: Nocturnal polysomnogram was performed in standard manner with following results.  Sleep  Study began at 22:52 hours and had duration of 484 minutes,  Total sleep time (TST) = 452 minutes; this results in a sleep efficiency (SE) = 93 %.  Wake time after sleep onset (WASO) = 28 minutes  Sleep latency = 2 min. and REM latency = 80 min.  As a percentage of total sleep time (TST) 23% was spent in sleep stage REM  65% was spent in sleep  stages 1 and 2  12% was spent in sleep stages 3 and 4.  Sleep architecture was minimally fragmented. Five (5) REM periods were noted.  Respiratory events  During the study the following respiratory events were recorded,  Central apneas (3), obstructive apneas (0), mixed apneas (0), hypopneas (2).  Calculated apnea-hypopnea index (AHI) = 1/hour  The patient slept all positions.  Oxygen saturation during wakefulness: 98 %;  Lowest oxygen saturation observed during sleep: 93 %.  Other variables  Excessive movements and verbalizations were not described by the technical staff.  Periodic limb movement index (PLMI): 0  Arousals: 14/hour PLM= 0% Respiratory= 0% Rzoehjakglg=414%  Snoring: None  On post-sleep questionnaire, patient indicated that sleep in lab worse than that at home  Diagnostic Impression The findings of this polysomnogram do not support  the diagnosis of obstructive sleep apnea. See MSLT Report.  Recommendation Please convey these results to the patient. Clinical correlation is recommended. If you have questions please contact Sleep Laboratory.  Morgan Craft MD  Diplomate of the American Board of Sleep Medicine  ICSD Code: 780.52-9 Intrinsic sleep disorder NOS    Assessment/Plan:     Problem List Items Addressed This Visit     Narcolepsy - Primary    Hypertension     Stable on INDERAL                 No follow-ups on file.    This note was prepared using voice recognition system and is likely to have sound alike errors that may have been overlooked even after proof reading.  Please call me with any questions    Discussed diagnosis, its evaluation, treatment and usual course. All questions answered.      Scott Rachel MD

## 2022-04-25 NOTE — TELEPHONE ENCOUNTER
Tried calling pt to review chart prior to virtual visit. No answer, left vm for pt to return call to clinic.

## 2022-04-26 ENCOUNTER — OFFICE VISIT (OUTPATIENT)
Dept: PULMONOLOGY | Facility: CLINIC | Age: 52
End: 2022-04-26
Payer: COMMERCIAL

## 2022-04-26 DIAGNOSIS — I10 PRIMARY HYPERTENSION: ICD-10-CM

## 2022-04-26 DIAGNOSIS — G47.421 NARCOLEPSY DUE TO UNDERLYING CONDITION WITH CATAPLEXY: Primary | ICD-10-CM

## 2022-04-26 PROCEDURE — 3044F PR MOST RECENT HEMOGLOBIN A1C LEVEL <7.0%: ICD-10-PCS | Mod: CPTII,95,, | Performed by: INTERNAL MEDICINE

## 2022-04-26 PROCEDURE — 1160F PR REVIEW ALL MEDS BY PRESCRIBER/CLIN PHARMACIST DOCUMENTED: ICD-10-PCS | Mod: CPTII,95,, | Performed by: INTERNAL MEDICINE

## 2022-04-26 PROCEDURE — 99204 OFFICE O/P NEW MOD 45 MIN: CPT | Mod: 95,,, | Performed by: INTERNAL MEDICINE

## 2022-04-26 PROCEDURE — 1159F MED LIST DOCD IN RCRD: CPT | Mod: CPTII,95,, | Performed by: INTERNAL MEDICINE

## 2022-04-26 PROCEDURE — 3044F HG A1C LEVEL LT 7.0%: CPT | Mod: CPTII,95,, | Performed by: INTERNAL MEDICINE

## 2022-04-26 PROCEDURE — 1159F PR MEDICATION LIST DOCUMENTED IN MEDICAL RECORD: ICD-10-PCS | Mod: CPTII,95,, | Performed by: INTERNAL MEDICINE

## 2022-04-26 PROCEDURE — 99204 PR OFFICE/OUTPT VISIT, NEW, LEVL IV, 45-59 MIN: ICD-10-PCS | Mod: 95,,, | Performed by: INTERNAL MEDICINE

## 2022-04-26 PROCEDURE — 1160F RVW MEDS BY RX/DR IN RCRD: CPT | Mod: CPTII,95,, | Performed by: INTERNAL MEDICINE

## 2022-04-26 NOTE — ASSESSMENT & PLAN NOTE
1. Do Not Drive if you are sleepy!  2. Will continue xywav at 3.75g and 3 grams at night.Will change the dosing with new rx.   3. Continue methylphenidate 18 mg po daily. And ritalin 10 mg BID Prn.        REMS form will be filled

## 2022-04-29 ENCOUNTER — TELEPHONE (OUTPATIENT)
Dept: PULMONOLOGY | Facility: CLINIC | Age: 52
End: 2022-04-29
Payer: COMMERCIAL

## 2022-04-29 NOTE — TELEPHONE ENCOUNTER
Spoke to pharmacy about  signature on form for pt that was generated will have to resend form with hand written signature

## 2022-04-29 NOTE — TELEPHONE ENCOUNTER
----- Message from Lili Coulter sent at 4/29/2022 11:32 AM CDT -----  Regarding: bairon  Contact: [facility]  They need to verify Dr Rachel signature, please call them back at [phone] opt [phone] opt 4

## 2022-05-06 ENCOUNTER — PATIENT MESSAGE (OUTPATIENT)
Dept: PULMONOLOGY | Facility: CLINIC | Age: 52
End: 2022-05-06
Payer: COMMERCIAL

## 2022-05-06 DIAGNOSIS — G47.419 PRIMARY NARCOLEPSY WITHOUT CATAPLEXY: ICD-10-CM

## 2022-05-06 RX ORDER — METHYLPHENIDATE HYDROCHLORIDE 18 MG/1
18 TABLET ORAL EVERY MORNING
Qty: 90 TABLET | Refills: 0 | Status: SHIPPED | OUTPATIENT
Start: 2022-05-06 | End: 2022-08-02 | Stop reason: SDUPTHER

## 2022-05-15 ENCOUNTER — PATIENT MESSAGE (OUTPATIENT)
Dept: PULMONOLOGY | Facility: CLINIC | Age: 52
End: 2022-05-15
Payer: COMMERCIAL

## 2022-05-17 ENCOUNTER — PATIENT MESSAGE (OUTPATIENT)
Dept: SLEEP MEDICINE | Facility: CLINIC | Age: 52
End: 2022-05-17
Payer: COMMERCIAL

## 2022-05-17 DIAGNOSIS — G47.419 PRIMARY NARCOLEPSY WITHOUT CATAPLEXY: ICD-10-CM

## 2022-05-18 RX ORDER — METHYLPHENIDATE HYDROCHLORIDE 10 MG/1
10 TABLET ORAL 2 TIMES DAILY
Qty: 120 TABLET | Refills: 0 | Status: SHIPPED | OUTPATIENT
Start: 2022-05-18 | End: 2022-07-11 | Stop reason: SDUPTHER

## 2022-05-31 RX ORDER — PROPRANOLOL HYDROCHLORIDE 10 MG/1
10 TABLET ORAL 2 TIMES DAILY PRN
Qty: 90 TABLET | Refills: 2 | Status: SHIPPED | OUTPATIENT
Start: 2022-05-31 | End: 2022-10-20 | Stop reason: SDUPTHER

## 2022-05-31 NOTE — TELEPHONE ENCOUNTER
Refill Authorization Note   Kayla Lowery  is requesting a refill authorization.  Brief Assessment and Rationale for Refill:  Approve     Medication Therapy Plan:       Medication Reconciliation Completed: No   Comments:     No Care Gaps recommended.     Note composed:11:40 AM 05/31/2022

## 2022-05-31 NOTE — TELEPHONE ENCOUNTER
No new care gaps identified.  [facility] Embedded Care Gaps. Reference number: [id number]. 5/31/2022   9:27:26 AM BÁRBARAT

## 2022-06-02 ENCOUNTER — IMMUNIZATION (OUTPATIENT)
Dept: PRIMARY CARE CLINIC | Facility: CLINIC | Age: 52
End: 2022-06-02
Payer: COMMERCIAL

## 2022-06-02 ENCOUNTER — OFFICE VISIT (OUTPATIENT)
Dept: ALLERGY | Facility: CLINIC | Age: 52
End: 2022-06-02
Payer: COMMERCIAL

## 2022-06-02 VITALS
DIASTOLIC BLOOD PRESSURE: 78 MMHG | HEART RATE: 64 BPM | HEIGHT: 62 IN | SYSTOLIC BLOOD PRESSURE: 115 MMHG | BODY MASS INDEX: 19.71 KG/M2 | WEIGHT: 107.13 LBS | TEMPERATURE: 99 F

## 2022-06-02 DIAGNOSIS — K90.49 FOOD INTOLERANCE: ICD-10-CM

## 2022-06-02 DIAGNOSIS — Z23 NEED FOR VACCINATION: Primary | ICD-10-CM

## 2022-06-02 DIAGNOSIS — L71.9 ROSACEA: Primary | ICD-10-CM

## 2022-06-02 PROCEDURE — 99204 OFFICE O/P NEW MOD 45 MIN: CPT | Mod: S$GLB,,, | Performed by: ALLERGY & IMMUNOLOGY

## 2022-06-02 PROCEDURE — 3008F PR BODY MASS INDEX (BMI) DOCUMENTED: ICD-10-PCS | Mod: CPTII,S$GLB,, | Performed by: ALLERGY & IMMUNOLOGY

## 2022-06-02 PROCEDURE — 1159F PR MEDICATION LIST DOCUMENTED IN MEDICAL RECORD: ICD-10-PCS | Mod: CPTII,S$GLB,, | Performed by: ALLERGY & IMMUNOLOGY

## 2022-06-02 PROCEDURE — 3074F SYST BP LT 130 MM HG: CPT | Mod: CPTII,S$GLB,, | Performed by: ALLERGY & IMMUNOLOGY

## 2022-06-02 PROCEDURE — 3008F BODY MASS INDEX DOCD: CPT | Mod: CPTII,S$GLB,, | Performed by: ALLERGY & IMMUNOLOGY

## 2022-06-02 PROCEDURE — 99999 PR PBB SHADOW E&M-EST. PATIENT-LVL IV: CPT | Mod: PBBFAC,,, | Performed by: ALLERGY & IMMUNOLOGY

## 2022-06-02 PROCEDURE — 99204 PR OFFICE/OUTPT VISIT, NEW, LEVL IV, 45-59 MIN: ICD-10-PCS | Mod: S$GLB,,, | Performed by: ALLERGY & IMMUNOLOGY

## 2022-06-02 PROCEDURE — 91305 COVID-19, MRNA, LNP-S, PF, 30 MCG/0.3 ML DOSE VACCINE (PFIZER): CPT | Mod: PBBFAC | Performed by: FAMILY MEDICINE

## 2022-06-02 PROCEDURE — 3078F PR MOST RECENT DIASTOLIC BLOOD PRESSURE < 80 MM HG: ICD-10-PCS | Mod: CPTII,S$GLB,, | Performed by: ALLERGY & IMMUNOLOGY

## 2022-06-02 PROCEDURE — 3044F PR MOST RECENT HEMOGLOBIN A1C LEVEL <7.0%: ICD-10-PCS | Mod: CPTII,S$GLB,, | Performed by: ALLERGY & IMMUNOLOGY

## 2022-06-02 PROCEDURE — 1159F MED LIST DOCD IN RCRD: CPT | Mod: CPTII,S$GLB,, | Performed by: ALLERGY & IMMUNOLOGY

## 2022-06-02 PROCEDURE — 3074F PR MOST RECENT SYSTOLIC BLOOD PRESSURE < 130 MM HG: ICD-10-PCS | Mod: CPTII,S$GLB,, | Performed by: ALLERGY & IMMUNOLOGY

## 2022-06-02 PROCEDURE — 3078F DIAST BP <80 MM HG: CPT | Mod: CPTII,S$GLB,, | Performed by: ALLERGY & IMMUNOLOGY

## 2022-06-02 PROCEDURE — 99999 PR PBB SHADOW E&M-EST. PATIENT-LVL IV: ICD-10-PCS | Mod: PBBFAC,,, | Performed by: ALLERGY & IMMUNOLOGY

## 2022-06-02 PROCEDURE — 3044F HG A1C LEVEL LT 7.0%: CPT | Mod: CPTII,S$GLB,, | Performed by: ALLERGY & IMMUNOLOGY

## 2022-06-02 NOTE — PROGRESS NOTES
Subjective:       Patient ID: Kayla Lowery is a 52 y.o. female.    Chief Complaint:  Rash      HPI: 52 year old female - Dr. Lowery- History of Rosacea worse on the right side of her face. She reports after wearing a mask the right side of her face and left side- erythema and facial edema. She has seen Dermatology. Lesions are painful. Heat and the sun exacerbate lesions.  She has had patch testing and steroids for lesions.  Patch testing positive for the following:  Gold sodium thiosulfate, jac mix, imidazolidinyl, cl + Me- isothiazolinone    She is concerned about foods.  She denies anaphylaxis or hives with foods.    Tobramycin- burning an itching  codiene- nausea      Past Medical History:   Diagnosis Date    ADHD (attention deficit hyperactivity disorder)     Anxiety     History of abnormal cervical Pap smear 2012    Hypertension     Migraines     Narcolepsy     Rosacea      Family History   Problem Relation Age of Onset    Hypertension Mother     Heart disease Mother     Parkinsonism Mother     Coronary artery disease Mother     Diabetes Mother     Hyperlipidemia Mother     No Known Problems Father     Bladder Cancer Maternal Grandfather     Hypertension Paternal Grandfather     Heart disease Paternal Grandfather     Prostate cancer Paternal Grandfather     Macular degeneration Maternal Aunt      Current Outpatient Medications on File Prior to Visit   Medication Sig Dispense Refill    cetirizine (ZYRTEC) 10 MG tablet Take 10 mg by mouth once daily.      doxycycline (VIBRAMYCIN) 100 MG Cap Take 1 capsule (100 mg total) by mouth every 12 (twelve) hours. 60 capsule 2    FLUoxetine 20 MG tablet Take 1 tablet (20 mg total) by mouth once daily. 90 tablet 1    methylphenidate HCl (RITALIN) 10 MG tablet Take 1 tablet (10 mg total) by mouth 2 (two) times daily. 120 tablet 0    methylphenidate HCl 18 MG CR tablet Take 1 tablet (18 mg total) by mouth every morning. 90 tablet 0     metronidazole 0.75% (METROCREAM) 0.75 % Crea Apply topically 2 (two) times daily. 45 g 3    ondansetron (ZOFRAN) 8 MG tablet Take 1 tablet by mouth every 8 (eight) hours as needed.      oxymetazoline (RHOFADE) 1 % Crea Apply daily to affected area 30 g 5    propranoloL (INDERAL) 10 MG tablet Take 1 tablet (10 mg total) by mouth 2 (two) times daily as needed. 90 tablet 2    rizatriptan (MAXALT-MLT) 10 MG disintegrating tablet Take 10 mg by mouth as needed.      tacrolimus (PROTOPIC) 0.1 % ointment Apply topically 2 (two) times daily. 60 g 2    valACYclovir (VALTREX) 1000 MG tablet Take 2,000 mg by mouth as needed.      XYWAV 0.5 gram/mL Soln       [DISCONTINUED] nystatin-triamcinolone (MYCOLOG) ointment Apply topically 2 (two) times daily. for 7 days 30 g 1     No current facility-administered medications on file prior to visit.       Review of patient's allergies indicates:   Allergen Reactions    Gold sodium thiosulfate Dermatitis, Edema, Itching, Rash and Swelling    Imidazolidinyl urea Dermatitis, Itching, Rash and Swelling    Codeine phosphate     Minocycline Other (See Comments)     dizziness    Tobramycin-lotepred        Environmental History: non contributory  Review of Systems   All other systems reviewed and are negative.       Objective:    Physical Exam  Vitals reviewed.   Constitutional:       General: She is not in acute distress.     Appearance: Normal appearance. She is well-developed. She is not ill-appearing, toxic-appearing or diaphoretic.   HENT:      Head: Normocephalic and atraumatic.      Right Ear: Tympanic membrane, ear canal and external ear normal. There is no impacted cerumen.      Left Ear: Tympanic membrane, ear canal and external ear normal. There is no impacted cerumen.      Nose: Nose normal. No congestion or rhinorrhea.      Mouth/Throat:      Pharynx: No oropharyngeal exudate or posterior oropharyngeal erythema.   Eyes:      General: No scleral icterus.        Right  eye: No discharge.         Left eye: No discharge.      Pupils: Pupils are equal, round, and reactive to light.   Neck:      Thyroid: No thyromegaly.   Cardiovascular:      Rate and Rhythm: Normal rate and regular rhythm.      Heart sounds: Normal heart sounds. No murmur heard.    No friction rub. No gallop.   Pulmonary:      Effort: Pulmonary effort is normal. No respiratory distress.      Breath sounds: Normal breath sounds. No stridor. No wheezing, rhonchi or rales.   Chest:      Chest wall: No tenderness.   Abdominal:      General: Bowel sounds are normal. There is no distension.      Palpations: Abdomen is soft. There is no mass.      Tenderness: There is no abdominal tenderness. There is no guarding or rebound.      Hernia: No hernia is present.   Musculoskeletal:         General: No swelling, tenderness, deformity or signs of injury. Normal range of motion.      Cervical back: Normal range of motion and neck supple. No rigidity or tenderness. No muscular tenderness.      Right lower leg: No edema.      Left lower leg: No edema.   Lymphadenopathy:      Cervical: No cervical adenopathy.   Skin:     General: Skin is warm.      Coloration: Skin is not jaundiced or pale.      Findings: No bruising or erythema.   Neurological:      Mental Status: She is alert and oriented to person, place, and time.      Gait: Gait normal.   Psychiatric:         Mood and Affect: Mood normal.         Behavior: Behavior normal.         Thought Content: Thought content normal.         Judgment: Judgment normal.           Assessment:       1. Rosacea    2. Food intolerance         Plan:        Discussed the difference between food allergy and intolerance.   Rosacea    Food intolerance    Questions answered and concerns addressed.    RTC prMALIK fuentes MD spent a total of 45 minutes on the day of the visit.  This includes face to face time and non-face to face time preparing to see the patient (eg, review of tests), obtaining  and/or reviewing separately obtained history, documenting clinical information in the electronic or other health record, independently interpreting results and communicating results to the patient/family/caregiver, or care coordinator.

## 2022-06-16 ENCOUNTER — OFFICE VISIT (OUTPATIENT)
Dept: DERMATOLOGY | Facility: CLINIC | Age: 52
End: 2022-06-16
Payer: COMMERCIAL

## 2022-06-16 DIAGNOSIS — L23.9 ALLERGIC CONTACT DERMATITIS, UNSPECIFIED TRIGGER: ICD-10-CM

## 2022-06-16 DIAGNOSIS — L71.9 ROSACEA: Primary | ICD-10-CM

## 2022-06-16 PROCEDURE — 99213 OFFICE O/P EST LOW 20 MIN: CPT | Mod: S$GLB,,, | Performed by: DERMATOLOGY

## 2022-06-16 PROCEDURE — 99213 PR OFFICE/OUTPT VISIT, EST, LEVL III, 20-29 MIN: ICD-10-PCS | Mod: S$GLB,,, | Performed by: DERMATOLOGY

## 2022-06-16 PROCEDURE — 3044F PR MOST RECENT HEMOGLOBIN A1C LEVEL <7.0%: ICD-10-PCS | Mod: CPTII,S$GLB,, | Performed by: DERMATOLOGY

## 2022-06-16 PROCEDURE — 1160F RVW MEDS BY RX/DR IN RCRD: CPT | Mod: CPTII,S$GLB,, | Performed by: DERMATOLOGY

## 2022-06-16 PROCEDURE — 1159F PR MEDICATION LIST DOCUMENTED IN MEDICAL RECORD: ICD-10-PCS | Mod: CPTII,S$GLB,, | Performed by: DERMATOLOGY

## 2022-06-16 PROCEDURE — 1159F MED LIST DOCD IN RCRD: CPT | Mod: CPTII,S$GLB,, | Performed by: DERMATOLOGY

## 2022-06-16 PROCEDURE — 1160F PR REVIEW ALL MEDS BY PRESCRIBER/CLIN PHARMACIST DOCUMENTED: ICD-10-PCS | Mod: CPTII,S$GLB,, | Performed by: DERMATOLOGY

## 2022-06-16 PROCEDURE — 3044F HG A1C LEVEL LT 7.0%: CPT | Mod: CPTII,S$GLB,, | Performed by: DERMATOLOGY

## 2022-06-16 PROCEDURE — 99999 PR PBB SHADOW E&M-EST. PATIENT-LVL III: ICD-10-PCS | Mod: PBBFAC,,, | Performed by: DERMATOLOGY

## 2022-06-16 PROCEDURE — 99999 PR PBB SHADOW E&M-EST. PATIENT-LVL III: CPT | Mod: PBBFAC,,, | Performed by: DERMATOLOGY

## 2022-06-16 NOTE — PROGRESS NOTES
Subjective:       Patient ID:  Kayla Lowery is a 52 y.o. female who presents for   Chief Complaint   Patient presents with    Rosacea     Last seen 4/21/22 by Dr. Peacock for rosacea flare, increased doxy 100 mg from daily to BID, rx'd metronidazole cream and tacrolimus ointment BID.  Reports currently improved, decreased doxy back down to 100 mg daily about 2 weeks ago and remains controlled.  Reports she is tolerating doxy well and would like to stay on this dose for now.  Seeing optho for ocular symptoms.    Current regimen:  Washing with La Roche Posay wash (from safe list)  Rhofade qam  Metronidazole, tacrolimus BID  Neutrogena sunscreen from safe list  Make up from safe list    Notes that products with hyaluronic acid seemed to cause irritation    February 2022 patch test positive for gold sodium thiosulfate, with irritant reaction to imidazolidinyl urea;  Cl +ME - isothiazolinone, and jac mix.     Prior treatments:  Rhofade qam  Soolantra qpm - did not burn, interested in trying again  topical metronidazole- has been years since used  topical clinda - has been years since used  SSS cream samples - reports it burns  Bactrim in the past  minocycline - reports it causes vertigo  oracea samples  Azelaic acid - has been years, interested in trying again  Intermittent short courses of prednisone - help but then flare      Review of Systems   Eyes: Positive for eye irritation.   Skin: Positive for sun sensitivity, daily sunscreen use and activity-related sunscreen use. Negative for itching and rash.        Objective:    Physical Exam   Constitutional: She appears well-developed and well-nourished. No distress.   Neurological: She is alert and oriented to person, place, and time. She is not disoriented.   Psychiatric: She has a normal mood and affect.   Skin:   Areas Examined (abnormalities noted in diagram):   Head / Face Inspection Performed              Diagram Legend     Erythematous scaling  macule/papule c/w actinic keratosis       Vascular papule c/w angioma      Pigmented verrucoid papule/plaque c/w seborrheic keratosis      Yellow umbilicated papule c/w sebaceous hyperplasia      Irregularly shaped tan macule c/w lentigo     1-2 mm smooth white papules consistent with Milia      Movable subcutaneous cyst with punctum c/w epidermal inclusion cyst      Subcutaneous movable cyst c/w pilar cyst      Firm pink to brown papule c/w dermatofibroma      Pedunculated fleshy papule(s) c/w skin tag(s)      Evenly pigmented macule c/w junctional nevus     Mildly variegated pigmented, slightly irregular-bordered macule c/w mildly atypical nevus      Flesh colored to evenly pigmented papule c/w intradermal nevus       Pink pearly papule/plaque c/w basal cell carcinoma      Erythematous hyperkeratotic cursted plaque c/w SCC      Surgical scar with no sign of skin cancer recurrence      Open and closed comedones      Inflammatory papules and pustules      Verrucoid papule consistent consistent with wart     Erythematous eczematous patches and plaques     Dystrophic onycholytic nail with subungual debris c/w onychomycosis     Umbilicated papule    Erythematous-base heme-crusted tan verrucoid plaque consistent with inflamed seborrheic keratosis     Erythematous Silvery Scaling Plaque c/w Psoriasis     See annotation    ROCHELLE negative  Assessment / Plan:       Rosacea  - SC, currently controlled. Discussed decreasing doxy dose - she would like to stay on current dose for now. Recommend reconsidering decreasing doxy dose in 3 months  - continue current regimen: rhofade qam, metro cream BID, protopic ointment BID, doxy 100 mg daily  - consider Seysara, low dose isotretinoin, laser  - continue avoiding allergens; use safe list products; daily SPF     - doxycycline: Side effect profile of doxy reviewed including increased sun sensitivity and upset stomach, esophageal inflammation, effect on gut health and potential for  "developing antibiotic resistance.  Patient was instructed to take with food and water and to avoid lying down for 1 hour after taking. Patient was instructed to not become pregnant while on medication to effects on dental development in fetus; she acknowledged understanding of risks involved.            Allergic contact dermatitis- gold sodium thiosulfate  - and irritant reactions noted on initial read to imidazolidinyl urea;  Cl +ME - isothiazolinone, and jac mix  - continue avoiding allergens and continue using "safe list"           RTC 1 year or sooner if needed        LOS NUMBER AND COMPLEXITY OF PROBLEMS    COMPLEXITY OF DATA RISK TOTAL TIME (m)   33640  [id number] [] 1 self-limited or minor problem [x] Minimal to none [] No treatment recommended or patient to monitor. Reassurance.  15-29  10-19   94470  [id number] Low  [] 2 or more self limited or minor problems  [x] 1 stable chronic illness  [] 1 acute, uncomplicated illness or injury Limited (2)  [] Prior external notes from each unique source  [] Review result of each unique test  [] Order each unique test  OR [] Independent historian Low  []  OTC medications   []  Discussed/Decision for minor skin surgery (no risk factors) 30-44  20-29   49333  31311 Moderate  []  1 or more chronic unstable illness (not at goal or progression or exacerbation) or SE of treatment  []  2 or more stable chronic illnesses  []  1 acute illness with systemic symptoms  []  1 acute complicated injury  []  1 undiagnosed new problem with uncertain prognosis Moderate (1/3 below)  []  3 or more data items        *Now includes independent historian  []  Independent interpretation of a test  []  Discuss management/test with another provider Moderate  [x]  Prescription drug mgmt  []  Discussed/Decision for Minor surgery with risk factors  []  Mgmt limited by social determinates 45-59  30-39   00316  04096 High  []  1 or more chronic illness with severe exacerbation, progression or SE of " treatment  []  1 acute or chronic illness/injury that poses a threat to life or bodily function Extensive (2/3 below)  []  3 or more data items        *Now includes independent historian.  []  Independent interpretation of a test  []  Discuss management/test with another provider High  []  Major surgery with risk discussed  []  Drug therapy requiring intensive monitoring for toxicity  []  Hospitalization  []  Decision for DNR 60-74  40-54

## 2022-07-11 ENCOUNTER — PATIENT MESSAGE (OUTPATIENT)
Dept: INTERNAL MEDICINE | Facility: CLINIC | Age: 52
End: 2022-07-11
Payer: COMMERCIAL

## 2022-07-11 DIAGNOSIS — G47.419 PRIMARY NARCOLEPSY WITHOUT CATAPLEXY: ICD-10-CM

## 2022-07-11 NOTE — TELEPHONE ENCOUNTER
No new care gaps identified.  [facility] Embedded Care Gaps. Reference number: [id number]. 7/11/2022   7:59:15 AM CDT

## 2022-07-12 RX ORDER — RIZATRIPTAN BENZOATE 10 MG/1
10 TABLET, ORALLY DISINTEGRATING ORAL
Qty: 9 TABLET | Refills: 1 | Status: SHIPPED | OUTPATIENT
Start: 2022-07-12 | End: 2023-05-11 | Stop reason: SDUPTHER

## 2022-07-13 RX ORDER — METHYLPHENIDATE HYDROCHLORIDE 10 MG/1
10 TABLET ORAL 2 TIMES DAILY
Qty: 120 TABLET | Refills: 0 | Status: SHIPPED | OUTPATIENT
Start: 2022-07-13 | End: 2022-09-11 | Stop reason: SDUPTHER

## 2022-08-02 ENCOUNTER — PATIENT MESSAGE (OUTPATIENT)
Dept: ADMINISTRATIVE | Facility: OTHER | Age: 52
End: 2022-08-02
Payer: COMMERCIAL

## 2022-08-02 ENCOUNTER — PATIENT MESSAGE (OUTPATIENT)
Dept: INTERNAL MEDICINE | Facility: CLINIC | Age: 52
End: 2022-08-02
Payer: COMMERCIAL

## 2022-08-02 ENCOUNTER — PATIENT MESSAGE (OUTPATIENT)
Dept: DERMATOLOGY | Facility: CLINIC | Age: 52
End: 2022-08-02
Payer: COMMERCIAL

## 2022-08-02 DIAGNOSIS — B00.2 RECURRENT ORAL HERPES SIMPLEX: Primary | ICD-10-CM

## 2022-08-02 DIAGNOSIS — G47.419 PRIMARY NARCOLEPSY WITHOUT CATAPLEXY: ICD-10-CM

## 2022-08-02 RX ORDER — FLUOXETINE 20 MG/1
20 TABLET ORAL DAILY
Qty: 90 TABLET | Refills: 1 | Status: SHIPPED | OUTPATIENT
Start: 2022-08-02 | End: 2023-01-29 | Stop reason: SDUPTHER

## 2022-08-02 RX ORDER — ONDANSETRON HYDROCHLORIDE 8 MG/1
8 TABLET, FILM COATED ORAL EVERY 8 HOURS PRN
Qty: 30 TABLET | Refills: 0 | Status: SHIPPED | OUTPATIENT
Start: 2022-08-02 | End: 2023-09-26 | Stop reason: SDUPTHER

## 2022-08-02 RX ORDER — VALACYCLOVIR HYDROCHLORIDE 1 G/1
TABLET, FILM COATED ORAL
Qty: 30 TABLET | Refills: 3 | Status: SHIPPED | OUTPATIENT
Start: 2022-08-02

## 2022-08-02 NOTE — TELEPHONE ENCOUNTER
No new care gaps identified.  [facility] Embedded Care Gaps. Reference number: [id number]. 8/02/2022   7:38:23 AM CDT

## 2022-08-02 NOTE — TELEPHONE ENCOUNTER
Refill Decision Note   Kayla Lowery  is requesting a refill authorization.  Brief Assessment and Rationale for Refill:  Approve     Medication Therapy Plan:       Medication Reconciliation Completed: No   Comments:     No Care Gaps recommended.     Note composed:1:48 PM 08/02/2022

## 2022-08-02 NOTE — TELEPHONE ENCOUNTER
No new care gaps identified.  [facility] Embedded Care Gaps. Reference number: [id number]. 8/02/2022   9:42:53 AM BÁRBARAT

## 2022-08-03 ENCOUNTER — PATIENT MESSAGE (OUTPATIENT)
Dept: SLEEP MEDICINE | Facility: CLINIC | Age: 52
End: 2022-08-03
Payer: COMMERCIAL

## 2022-08-03 RX ORDER — METHYLPHENIDATE HYDROCHLORIDE 18 MG/1
18 TABLET ORAL EVERY MORNING
Qty: 90 TABLET | Refills: 0 | Status: SHIPPED | OUTPATIENT
Start: 2022-08-03 | End: 2022-11-02 | Stop reason: SDUPTHER

## 2022-08-11 ENCOUNTER — OFFICE VISIT (OUTPATIENT)
Dept: SLEEP MEDICINE | Facility: CLINIC | Age: 52
End: 2022-08-11
Payer: COMMERCIAL

## 2022-08-11 VITALS
OXYGEN SATURATION: 98 % | RESPIRATION RATE: 16 BRPM | SYSTOLIC BLOOD PRESSURE: 116 MMHG | HEART RATE: 78 BPM | DIASTOLIC BLOOD PRESSURE: 78 MMHG | BODY MASS INDEX: 19.23 KG/M2 | HEIGHT: 62 IN | WEIGHT: 104.5 LBS

## 2022-08-11 DIAGNOSIS — I10 PRIMARY HYPERTENSION: ICD-10-CM

## 2022-08-11 DIAGNOSIS — G47.421 NARCOLEPSY DUE TO UNDERLYING CONDITION WITH CATAPLEXY: Primary | ICD-10-CM

## 2022-08-11 PROCEDURE — 1159F MED LIST DOCD IN RCRD: CPT | Mod: CPTII,S$GLB,, | Performed by: INTERNAL MEDICINE

## 2022-08-11 PROCEDURE — 3044F HG A1C LEVEL LT 7.0%: CPT | Mod: CPTII,S$GLB,, | Performed by: INTERNAL MEDICINE

## 2022-08-11 PROCEDURE — 1159F PR MEDICATION LIST DOCUMENTED IN MEDICAL RECORD: ICD-10-PCS | Mod: CPTII,S$GLB,, | Performed by: INTERNAL MEDICINE

## 2022-08-11 PROCEDURE — 3008F BODY MASS INDEX DOCD: CPT | Mod: CPTII,S$GLB,, | Performed by: INTERNAL MEDICINE

## 2022-08-11 PROCEDURE — 3078F DIAST BP <80 MM HG: CPT | Mod: CPTII,S$GLB,, | Performed by: INTERNAL MEDICINE

## 2022-08-11 PROCEDURE — 1160F RVW MEDS BY RX/DR IN RCRD: CPT | Mod: CPTII,S$GLB,, | Performed by: INTERNAL MEDICINE

## 2022-08-11 PROCEDURE — 3008F PR BODY MASS INDEX (BMI) DOCUMENTED: ICD-10-PCS | Mod: CPTII,S$GLB,, | Performed by: INTERNAL MEDICINE

## 2022-08-11 PROCEDURE — 3074F SYST BP LT 130 MM HG: CPT | Mod: CPTII,S$GLB,, | Performed by: INTERNAL MEDICINE

## 2022-08-11 PROCEDURE — 99214 PR OFFICE/OUTPT VISIT, EST, LEVL IV, 30-39 MIN: ICD-10-PCS | Mod: S$GLB,,, | Performed by: INTERNAL MEDICINE

## 2022-08-11 PROCEDURE — 3044F PR MOST RECENT HEMOGLOBIN A1C LEVEL <7.0%: ICD-10-PCS | Mod: CPTII,S$GLB,, | Performed by: INTERNAL MEDICINE

## 2022-08-11 PROCEDURE — 1160F PR REVIEW ALL MEDS BY PRESCRIBER/CLIN PHARMACIST DOCUMENTED: ICD-10-PCS | Mod: CPTII,S$GLB,, | Performed by: INTERNAL MEDICINE

## 2022-08-11 PROCEDURE — 3078F PR MOST RECENT DIASTOLIC BLOOD PRESSURE < 80 MM HG: ICD-10-PCS | Mod: CPTII,S$GLB,, | Performed by: INTERNAL MEDICINE

## 2022-08-11 PROCEDURE — 99214 OFFICE O/P EST MOD 30 MIN: CPT | Mod: S$GLB,,, | Performed by: INTERNAL MEDICINE

## 2022-08-11 PROCEDURE — 3074F PR MOST RECENT SYSTOLIC BLOOD PRESSURE < 130 MM HG: ICD-10-PCS | Mod: CPTII,S$GLB,, | Performed by: INTERNAL MEDICINE

## 2022-08-11 PROCEDURE — 99999 PR PBB SHADOW E&M-EST. PATIENT-LVL IV: CPT | Mod: PBBFAC,,, | Performed by: INTERNAL MEDICINE

## 2022-08-11 PROCEDURE — 99999 PR PBB SHADOW E&M-EST. PATIENT-LVL IV: ICD-10-PCS | Mod: PBBFAC,,, | Performed by: INTERNAL MEDICINE

## 2022-08-11 NOTE — PROGRESS NOTES
Pulmonary Outpatient  Visit     Subjective:       Patient ID: Kayla Lowery is a 52 y.o. female.    Chief Complaint: narcolepsy      Kayla Lowery is 52 y.o.  Physician: [facility]  Dx Narcolepsy followed by Dr Steve GIPSON  Son rosy has narcolepsy  Xywav and methylphenidate  Sleep study was done in 2006:   Displaced here after [place]  Likely had narcolepsy since 12 years, Had ADHD  Since will fall asleep during tests  Seen psychologist and neurologist  Bed time: 9-0930pm  Wake time: 0430 am  SOL 15 mins  No problems with Meds  Notes reviewed  Had tremor  Sleep paralysis and hallucination Last event  2014    Last event: cannot remember  No MVA     Notes reveiwed  1. Primary narcolepsy without cataplexy     PLAN    Kayla was seen today for narcolepsy without cataplexy.    Diagnoses and all orders for this visit:    Primary narcolepsy without cataplexy    1. Do Not Drive if you are sleepy!  2. Will continue xywav at 3.75g and 3 grams at night.Will change the dosing with new rx.   3. Continue methylphenidate 18 mg po daily. And ritalin 10 mg BID Prn.    Electronically signed by Rodo Wilson MD at 09/14/2021 1:42 PM CDT          08/11/2022  Last 04/26/2022  Doing well on Xywav 1 st dose 3.75gm and 2nd dose 3 gm  Wake up 05:30 am  Doing well in day time   Takes concerta in AM  Lasts 6 hrs  methyphenolate x 2 in PM  Inderal for tachy and tremors        The following portions of the patient's history were reviewed and updated as appropriate:   She  has a past medical history of ADHD (attention deficit hyperactivity disorder), Anxiety, History of abnormal cervical Pap smear (2012), Hypertension, Migraines, Narcolepsy, and Rosacea.  She does not have any pertinent problems on file.  She  has no past surgical history on file.  Her family history includes Bladder Cancer in her maternal grandfather; Coronary artery disease in her mother; Diabetes in her mother; Heart disease  in her mother and paternal grandfather; Hyperlipidemia in her mother; Hypertension in her mother and paternal grandfather; Macular degeneration in her maternal aunt; No Known Problems in her father; Parkinsonism in her mother; Prostate cancer in her paternal grandfather.  She  reports that she has never smoked. She has never used smokeless tobacco. She reports current alcohol use. She reports that she does not use drugs.  She has a current medication list which includes the following prescription(s): cetirizine, doxycycline, fluoxetine, methylphenidate hcl, methylphenidate hcl, metronidazole 0.75%, ondansetron, oxymetazoline, propranolol, rizatriptan, tacrolimus, valacyclovir, and xywav.  Current Outpatient Medications on File Prior to Visit   Medication Sig Dispense Refill    cetirizine (ZYRTEC) 10 MG tablet Take 10 mg by mouth once daily.      doxycycline (VIBRAMYCIN) 100 MG Cap Take 1 capsule (100 mg total) by mouth every 12 (twelve) hours. 60 capsule 2    FLUoxetine 20 MG tablet Take 1 tablet (20 mg total) by mouth once daily. 90 tablet 1    methylphenidate HCl (RITALIN) 10 MG tablet Take 1 tablet (10 mg total) by mouth 2 (two) times daily. 120 tablet 0    methylphenidate HCl 18 MG CR tablet Take 1 tablet (18 mg total) by mouth every morning. 90 tablet 0    metronidazole 0.75% (METROCREAM) 0.75 % Crea Apply topically 2 (two) times daily. 45 g 3    ondansetron (ZOFRAN) 8 MG tablet Take 1 tablet (8 mg total) by mouth every 8 (eight) hours as needed. 30 tablet 0    oxymetazoline (RHOFADE) 1 % Crea Apply daily to affected area 30 g 11    propranoloL (INDERAL) 10 MG tablet Take 1 tablet (10 mg total) by mouth 2 (two) times daily as needed. 90 tablet 2    rizatriptan (MAXALT-MLT) 10 MG disintegrating tablet Take 1 tablet (10 mg total) by mouth as needed for Migraine. 9 tablet 1    tacrolimus (PROTOPIC) 0.1 % ointment Apply topically 2 (two) times daily. 60 g 2    valACYclovir (VALTREX) 1000 MG tablet Take  2 tablets by mouth twice daily for 2 days as needed first signs of outbreak 30 tablet 3    XYWAV 0.5 gram/mL Soln        No current facility-administered medications on file prior to visit.     She is allergic to gold sodium thiosulfate, imidazolidinyl urea, codeine phosphate, minocycline, and tobramycin-lotepred..      Review of Systems   All other systems reviewed and are negative.      Outpatient Encounter Medications as of 8/11/2022   Medication Sig Dispense Refill    cetirizine (ZYRTEC) 10 MG tablet Take 10 mg by mouth once daily.      doxycycline (VIBRAMYCIN) 100 MG Cap Take 1 capsule (100 mg total) by mouth every 12 (twelve) hours. 60 capsule 2    FLUoxetine 20 MG tablet Take 1 tablet (20 mg total) by mouth once daily. 90 tablet 1    methylphenidate HCl (RITALIN) 10 MG tablet Take 1 tablet (10 mg total) by mouth 2 (two) times daily. 120 tablet 0    methylphenidate HCl 18 MG CR tablet Take 1 tablet (18 mg total) by mouth every morning. 90 tablet 0    metronidazole 0.75% (METROCREAM) 0.75 % Crea Apply topically 2 (two) times daily. 45 g 3    ondansetron (ZOFRAN) 8 MG tablet Take 1 tablet (8 mg total) by mouth every 8 (eight) hours as needed. 30 tablet 0    oxymetazoline (RHOFADE) 1 % Crea Apply daily to affected area 30 g 11    propranoloL (INDERAL) 10 MG tablet Take 1 tablet (10 mg total) by mouth 2 (two) times daily as needed. 90 tablet 2    rizatriptan (MAXALT-MLT) 10 MG disintegrating tablet Take 1 tablet (10 mg total) by mouth as needed for Migraine. 9 tablet 1    tacrolimus (PROTOPIC) 0.1 % ointment Apply topically 2 (two) times daily. 60 g 2    valACYclovir (VALTREX) 1000 MG tablet Take 2 tablets by mouth twice daily for 2 days as needed first signs of outbreak 30 tablet 3    XYWAV 0.5 gram/mL Soln        No facility-administered encounter medications on file as of 8/11/2022.       Objective:     Vital Signs (Most Recent)  Vital Signs  Pulse: 78  Resp: 16  SpO2: 98 %  BP: 116/78  Height and  "Weight  Height: 5' 2" (157.5 cm)  Weight: 47.4 kg (104 lb 8 oz)  BSA (Calculated - sq m): 1.44 sq meters  BMI (Calculated): 19.1  Weight in (lb) to have BMI = 25: 136.4]  Wt Readings from Last 2 Encounters:   22 47.4 kg (104 lb 8 oz)   22 48.6 kg (107 lb 2.3 oz)       Physical Exam   Constitutional: She appears well-developed and well-nourished.   Nursing note and vitals reviewed.      Laboratory  Lab Results   Component Value Date    WBC 6.40 2022    RBC 4.57 2022    HGB 14.2 2022    HCT 41.8 2022    MCV 92 2022    MCH 31.1 (H) 2022    MCHC 34.0 2022    RDW 12.2 2022     2022    MPV 9.6 2022    GRAN 2.7 2007    GRAN 55.9 2007    LYMPH 35.6 2007    LYMPH 1.8 2007    MONO 6.7 2007    MONO 0.3 2007    EOS 0.1 2007    BASO 0.0 2007    EOSINOPHIL 1.4 2007    BASOPHIL 0.4 2007       BMP  Lab Results   Component Value Date     2022    K 3.7 2022     2022    CO2 29 2022    BUN 16 2022    CREATININE 0.8 2022    CALCIUM 9.5 2022    ANIONGAP 9 2022    ESTGFRAFRICA >60 2022    EGFRNONAA >60 2022    AST 23 2022    ALT 19 2022    PROT 6.9 2022       No results found for: BNP    Lab Results   Component Value Date    TSH 1.470 2022       No results found for: SEDRATE    No results found for: CRP  No results found for: IGE     No results found for: ASPERGILLUS  No results found for: AFUMIGATUSCL     No results found for: ACE     Diagnostic Results:  I have personally reviewed today the following studies:  [facility]  Report Sleep Study: Multiple Sleep Latency Test (MSLT)  Patient: Kayla Lowery Clinic Number: [id number]  Date: 2006 Referring: Luana  Ht: 63 in Wt: 112 lbs BMI: 19 : 1970 Age: 36 Sex: Female  Reason for the study: ADHD, R/O " Narcolepsy. Daytime drowsiness. Migraine headaches  Medications: Stopped Concerta x 2 weeks, NSAIDs  Multiple sleep latency test (MSLT)(5 naps) was performed in the standard manner with the following results.  Nap Start time Sleep latency(min) REM latency(min)  1 0908 2 -  2 1101 1.1 -  3 1300 6.0 -  4 1500 7.3 -  5 1700 2.3 -  Mean sleep latency = 3.7 min  Sleep onset REM periods (SOREM) = none  Diagnostic Impression  The mean sleep latency of 3.7 minutes is markedly reduced. Coupled with the previous nights polysomnogram these finding would be consistent with  the diagnosis of idiopathic hypersomnia or monosymptomatic narcolepsy. Clinical correlation is recommended.  Recommendation  Follow-up with attending physician. If you have additional questions  please contact Sleep Laboratory.  Morgan Craft MD  Diplomate of the American Board of Sleep Medicine  Narcolepsy ICSD Code: 347      [facility]  Report Sleep Study: Nocturnal polysomnogram  Patient: Kayla Lowery Clinic Number: [id number]  Date: 2006 Referring: Luana  Ht: 63 in Wt: 112 lbs BMI: 19 : 1970 Age: 36 Sex: Female  Reason for the study: ADHD, R/O Narcolepsy. Daytime drowsiness. Migraine headaches  Medications: Stopped Concerta x 2 weeks, NSAIDs  Test: Nocturnal polysomnogram was performed in standard manner with following results.  Sleep  Study began at 22:52 hours and had duration of 484 minutes,  Total sleep time (TST) = 452 minutes; this results in a sleep efficiency (SE) = 93 %.  Wake time after sleep onset (WASO) = 28 minutes  Sleep latency = 2 min. and REM latency = 80 min.  As a percentage of total sleep time (TST) 23% was spent in sleep stage REM  65% was spent in sleep stages 1 and 2  12% was spent in sleep stages 3 and 4.  Sleep architecture was minimally fragmented. Five (5) REM periods were noted.  Respiratory events  During the study the following respiratory events were  recorded,  Central apneas (3), obstructive apneas (0), mixed apneas (0), hypopneas (2).  Calculated apnea-hypopnea index (AHI) = 1/hour  The patient slept all positions.  Oxygen saturation during wakefulness: 98 %;  Lowest oxygen saturation observed during sleep: 93 %.  Other variables  Excessive movements and verbalizations were not described by the technical staff.  Periodic limb movement index (PLMI): 0  Arousals: 14/hour PLM= 0% Respiratory= 0% Pbctpwrxdhy=390%  Snoring: None  On post-sleep questionnaire, patient indicated that sleep in lab worse than that at home  Diagnostic Impression The findings of this polysomnogram do not support  the diagnosis of obstructive sleep apnea. See MSLT Report.  Recommendation Please convey these results to the patient. Clinical correlation is recommended. If you have questions please contact Sleep Laboratory.  Morgan Craft MD  Diplomate of the American Board of Sleep Medicine  ICSD Code: 780.52-9 Intrinsic sleep disorder NOS    Assessment/Plan:     Problem List Items Addressed This Visit     Narcolepsy - Primary     [place]: 6  Night : XYWAV  Day time : CONCERTA, methyphenodate           Relevant Orders    Echo    Hypertension     Stable on INDERAL           Relevant Orders    Echo          Follow up in about 4 months (around 12/11/2022), or cont current regime, Virtual, echo.    This note was prepared using voice recognition system and is likely to have sound alike errors that may have been overlooked even after proof reading.  Please call me with any questions    Discussed diagnosis, its evaluation, treatment and usual course. All questions answered.      Scott Rachel MD

## 2022-08-16 ENCOUNTER — HOSPITAL ENCOUNTER (OUTPATIENT)
Dept: CARDIOLOGY | Facility: HOSPITAL | Age: 52
Discharge: HOME | End: 2022-08-16
Attending: INTERNAL MEDICINE
Payer: COMMERCIAL

## 2022-08-16 VITALS
WEIGHT: 104 LBS | DIASTOLIC BLOOD PRESSURE: 78 MMHG | BODY MASS INDEX: 19.14 KG/M2 | SYSTOLIC BLOOD PRESSURE: 116 MMHG | HEIGHT: 62 IN

## 2022-08-16 DIAGNOSIS — I10 PRIMARY HYPERTENSION: ICD-10-CM

## 2022-08-16 DIAGNOSIS — G47.421 NARCOLEPSY DUE TO UNDERLYING CONDITION WITH CATAPLEXY: ICD-10-CM

## 2022-08-16 LAB
AORTIC ROOT ANNULUS: 2.7 CM
ASCENDING AORTA: 2.51 CM
AV INDEX (PROSTH): 0.85
AV MEAN GRADIENT: 3 MMHG
AV PEAK GRADIENT: 6 MMHG
AV VALVE AREA: 2.39 CM2
AV VELOCITY RATIO: 0.87
BSA FOR ECHO PROCEDURE: 1.44 M2
CV ECHO LV RWT: 0.41 CM
DOP CALC AO PEAK VEL: 1.26 M/S
DOP CALC AO VTI: 24.4 CM
DOP CALC LVOT AREA: 2.8 CM2
DOP CALC LVOT DIAMETER: 1.89 CM
DOP CALC LVOT PEAK VEL: 1.1 M/S
DOP CALC LVOT STROKE VOLUME: 58.33 CM3
DOP CALC RVOT PEAK VEL: 0.74 M/S
DOP CALC RVOT VTI: 17.3 CM
DOP CALCLVOT PEAK VEL VTI: 20.8 CM
E WAVE DECELERATION TIME: 157.91 MSEC
E/A RATIO: 1.12
E/E' RATIO: 7.48 M/S
ECHO LV POSTERIOR WALL: 0.79 CM (ref 0.6–1.1)
EJECTION FRACTION: 60 %
FRACTIONAL SHORTENING: 40 % (ref 28–44)
INTERVENTRICULAR SEPTUM: 0.77 CM (ref 0.6–1.1)
IVC DIAMETER: 1.08 CM
IVRT: 94.2 MSEC
LA MAJOR: 3.55 CM
LA MINOR: 3.85 CM
LA WIDTH: 3 CM
LEFT ATRIUM SIZE: 2.44 CM
LEFT ATRIUM VOLUME INDEX MOD: 14.4 ML/M2
LEFT ATRIUM VOLUME INDEX: 15.9 ML/M2
LEFT ATRIUM VOLUME MOD: 20.87 CM3
LEFT ATRIUM VOLUME: 22.98 CM3
LEFT INTERNAL DIMENSION IN SYSTOLE: 2.3 CM (ref 2.1–4)
LEFT VENTRICLE DIASTOLIC VOLUME INDEX: 44.17 ML/M2
LEFT VENTRICLE DIASTOLIC VOLUME: 64.04 ML
LEFT VENTRICLE MASS INDEX: 59 G/M2
LEFT VENTRICLE SYSTOLIC VOLUME INDEX: 12.5 ML/M2
LEFT VENTRICLE SYSTOLIC VOLUME: 18.09 ML
LEFT VENTRICULAR INTERNAL DIMENSION IN DIASTOLE: 3.85 CM (ref 3.5–6)
LEFT VENTRICULAR MASS: 84.86 G
LV LATERAL E/E' RATIO: 7.21 M/S
LV SEPTAL E/E' RATIO: 7.77 M/S
LVOT MG: 2.52 MMHG
LVOT MV: 0.73 CM/S
MV PEAK A VEL: 0.9 M/S
MV PEAK E VEL: 1.01 M/S
MV STENOSIS PRESSURE HALF TIME: 45.79 MS
MV VALVE AREA P 1/2 METHOD: 4.8 CM2
PISA TR MAX VEL: 2.56 M/S
PULM VEIN S/D RATIO: 1.35
PV MEAN GRADIENT: 1.19 MMHG
PV PEAK D VEL: 0.48 M/S
PV PEAK S VEL: 0.65 M/S
PV PEAK VELOCITY: 0.91 CM/S
RA MAJOR: 3.36 CM
RA PRESSURE: 3 MMHG
RA WIDTH: 2.31 CM
RIGHT VENTRICULAR END-DIASTOLIC DIMENSION: 2.62 CM
SINUS: 2.83 CM
STJ: 2.72 CM
TDI LATERAL: 0.14 M/S
TDI SEPTAL: 0.13 M/S
TDI: 0.14 M/S
TR MAX PG: 26 MMHG
TRICUSPID ANNULAR PLANE SYSTOLIC EXCURSION: 2.12 CM
TV REST PULMONARY ARTERY PRESSURE: 29 MMHG

## 2022-08-16 PROCEDURE — 93306 TTE W/DOPPLER COMPLETE: CPT

## 2022-08-16 PROCEDURE — 93306 TTE W/DOPPLER COMPLETE: CPT | Mod: 26,,, | Performed by: INTERNAL MEDICINE

## 2022-08-16 PROCEDURE — 93306 ECHO (CUPID ONLY): ICD-10-PCS | Mod: 26,,, | Performed by: INTERNAL MEDICINE

## 2022-08-31 ENCOUNTER — TELEPHONE (OUTPATIENT)
Dept: PULMONOLOGY | Facility: CLINIC | Age: 52
End: 2022-08-31
Payer: COMMERCIAL

## 2022-08-31 NOTE — TELEPHONE ENCOUNTER
----- Message from Sindhu Martines sent at 8/30/2022  4:07 PM CDT -----  Alfredo with [facility] called regarding prescription verification for XYWAV 0.5 gram/mL Soln. The prescriber ROSITA number is missing. Call back is [phone] option 3 and option 4. Thx. EL

## 2022-09-06 ENCOUNTER — TELEPHONE (OUTPATIENT)
Dept: PULMONOLOGY | Facility: CLINIC | Age: 52
End: 2022-09-06
Payer: COMMERCIAL

## 2022-09-06 NOTE — TELEPHONE ENCOUNTER
----- Message from Belgica Goff sent at 9/6/2022 12:17 PM CDT -----  Type:  Pharmacy Calling to Clarify an RX    Name of Caller:Yesika  Pharmacy Name:[facility]  Prescription Name:Xywaz  What do they need to clarify?:refill request, New Rx with correct dosage 3.75grams, states pt want to stay  3.75gram first dose and 3grams for 2nd dose  Best Call Back Number:[phone], opt 3, opt 4  Additional Information: na

## 2022-09-07 ENCOUNTER — PATIENT MESSAGE (OUTPATIENT)
Dept: SLEEP MEDICINE | Facility: CLINIC | Age: 52
End: 2022-09-07
Payer: COMMERCIAL

## 2022-09-11 DIAGNOSIS — G47.419 PRIMARY NARCOLEPSY WITHOUT CATAPLEXY: ICD-10-CM

## 2022-09-12 RX ORDER — METHYLPHENIDATE HYDROCHLORIDE 10 MG/1
10 TABLET ORAL 2 TIMES DAILY
Qty: 120 TABLET | Refills: 0 | Status: SHIPPED | OUTPATIENT
Start: 2022-09-12 | End: 2022-11-08 | Stop reason: SDUPTHER

## 2022-10-03 ENCOUNTER — PATIENT MESSAGE (OUTPATIENT)
Dept: DERMATOLOGY | Facility: CLINIC | Age: 52
End: 2022-10-03
Payer: COMMERCIAL

## 2022-10-06 DIAGNOSIS — L71.9 ROSACEA: Primary | ICD-10-CM

## 2022-10-06 RX ORDER — DOXYCYCLINE HYCLATE 50 MG/1
50 CAPSULE ORAL DAILY
Qty: 30 CAPSULE | Refills: 5 | Status: SHIPPED | OUTPATIENT
Start: 2022-10-06 | End: 2023-05-06

## 2022-10-20 RX ORDER — PROPRANOLOL HYDROCHLORIDE 10 MG/1
10 TABLET ORAL 2 TIMES DAILY PRN
Qty: 90 TABLET | Refills: 3 | Status: SHIPPED | OUTPATIENT
Start: 2022-10-20 | End: 2023-04-22 | Stop reason: SDUPTHER

## 2022-10-20 NOTE — TELEPHONE ENCOUNTER
Care Due:                  Date            Visit Type   Department     Provider  --------------------------------------------------------------------------------                                ADMIT                               REVIEW EXEC   [facility] INTERNAL  Last Visit: 01-      CHECK        MEDICINE       Cali Patel  Next Visit: None Scheduled  None         None Found                                                            Last  Test          Frequency    Reason                     Performed    Due Date  --------------------------------------------------------------------------------    Office Visit  12 months..  FLUoxetine, propranoloL,   01- 01-                             rizatriptan..............    [employer] Embedded Care Gaps. Reference number: [id number]. 10/20/2022   5:17:58 PM CDT

## 2022-10-21 NOTE — TELEPHONE ENCOUNTER
Refill Decision Note   Kayla Lowery  is requesting a refill authorization.  Brief Assessment and Rationale for Refill:  Approve    -Medication-Related Problems Identified: Requires appointment  Medication Therapy Plan:       Medication Reconciliation Completed: No   Comments:     Provider Staff:     Action is required for this patient.   Please see care gap opportunities below in Care Due Message.     Thanks!  [facility]     Appointments      Date Provider   Last Visit   1/11/2022 Cali Patel MD   Next Visit   Visit date not found Cali Patel MD     Note composed:9:33 PM 10/20/2022           Note composed:9:33 PM 10/20/2022

## 2022-11-02 DIAGNOSIS — G47.419 PRIMARY NARCOLEPSY WITHOUT CATAPLEXY: ICD-10-CM

## 2022-11-03 RX ORDER — METHYLPHENIDATE HYDROCHLORIDE 18 MG/1
18 TABLET ORAL EVERY MORNING
Qty: 90 TABLET | Refills: 0 | Status: SHIPPED | OUTPATIENT
Start: 2022-11-03 | End: 2023-01-29 | Stop reason: SDUPTHER

## 2022-11-06 ENCOUNTER — PATIENT MESSAGE (OUTPATIENT)
Dept: SLEEP MEDICINE | Facility: CLINIC | Age: 52
End: 2022-11-06
Payer: COMMERCIAL

## 2022-11-06 DIAGNOSIS — G47.419 PRIMARY NARCOLEPSY WITHOUT CATAPLEXY: ICD-10-CM

## 2022-11-06 RX ORDER — METHYLPHENIDATE HYDROCHLORIDE 10 MG/1
10 TABLET ORAL 2 TIMES DAILY
Qty: 120 TABLET | Refills: 0 | Status: CANCELLED | OUTPATIENT
Start: 2022-11-06 | End: 2023-01-05

## 2022-11-08 DIAGNOSIS — G47.419 PRIMARY NARCOLEPSY WITHOUT CATAPLEXY: ICD-10-CM

## 2022-11-09 RX ORDER — METHYLPHENIDATE HYDROCHLORIDE 10 MG/1
10 TABLET ORAL 2 TIMES DAILY
Qty: 120 TABLET | Refills: 0 | Status: SHIPPED | OUTPATIENT
Start: 2022-11-09 | End: 2023-01-13 | Stop reason: SDUPTHER

## 2022-11-16 ENCOUNTER — PATIENT MESSAGE (OUTPATIENT)
Dept: INTERNAL MEDICINE | Facility: CLINIC | Age: 52
End: 2022-11-16
Payer: COMMERCIAL

## 2022-11-16 DIAGNOSIS — S69.92XA INJURY OF LEFT HAND, INITIAL ENCOUNTER: Primary | ICD-10-CM

## 2022-11-16 DIAGNOSIS — M79.642 PAIN OF LEFT HAND: ICD-10-CM

## 2022-11-17 ENCOUNTER — PATIENT MESSAGE (OUTPATIENT)
Dept: SPORTS MEDICINE | Facility: CLINIC | Age: 52
End: 2022-11-17

## 2022-11-17 ENCOUNTER — TELEPHONE (OUTPATIENT)
Dept: PRIMARY CARE CLINIC | Facility: CLINIC | Age: 52
End: 2022-11-17
Payer: COMMERCIAL

## 2022-11-17 ENCOUNTER — PATIENT MESSAGE (OUTPATIENT)
Dept: INTERNAL MEDICINE | Facility: CLINIC | Age: 52
End: 2022-11-17
Payer: COMMERCIAL

## 2022-11-17 ENCOUNTER — OFFICE VISIT (OUTPATIENT)
Dept: SPORTS MEDICINE | Facility: CLINIC | Age: 52
End: 2022-11-17
Payer: COMMERCIAL

## 2022-11-17 ENCOUNTER — HOSPITAL ENCOUNTER (OUTPATIENT)
Dept: RADIOLOGY | Facility: HOSPITAL | Age: 52
Discharge: HOME | End: 2022-11-17
Attending: INTERNAL MEDICINE
Payer: COMMERCIAL

## 2022-11-17 DIAGNOSIS — S62.315A DISPLACED FRACTURE OF BASE OF FOURTH METACARPAL BONE, LEFT HAND, INITIAL ENCOUNTER FOR CLOSED FRACTURE: Primary | ICD-10-CM

## 2022-11-17 DIAGNOSIS — S62.608A: Primary | ICD-10-CM

## 2022-11-17 DIAGNOSIS — S69.92XA INJURY OF LEFT HAND, INITIAL ENCOUNTER: ICD-10-CM

## 2022-11-17 DIAGNOSIS — M79.642 PAIN OF LEFT HAND: ICD-10-CM

## 2022-11-17 PROCEDURE — 1159F MED LIST DOCD IN RCRD: CPT | Mod: CPTII,S$GLB,, | Performed by: STUDENT IN AN ORGANIZED HEALTH CARE EDUCATION/TRAINING PROGRAM

## 2022-11-17 PROCEDURE — 29075 PR APPLY FOREARM CAST: ICD-10-PCS | Mod: LT,S$GLB,, | Performed by: STUDENT IN AN ORGANIZED HEALTH CARE EDUCATION/TRAINING PROGRAM

## 2022-11-17 PROCEDURE — 1160F RVW MEDS BY RX/DR IN RCRD: CPT | Mod: CPTII,S$GLB,, | Performed by: STUDENT IN AN ORGANIZED HEALTH CARE EDUCATION/TRAINING PROGRAM

## 2022-11-17 PROCEDURE — 1160F PR REVIEW ALL MEDS BY PRESCRIBER/CLIN PHARMACIST DOCUMENTED: ICD-10-PCS | Mod: CPTII,S$GLB,, | Performed by: STUDENT IN AN ORGANIZED HEALTH CARE EDUCATION/TRAINING PROGRAM

## 2022-11-17 PROCEDURE — 73130 X-RAY EXAM OF HAND: CPT | Mod: TC,LT

## 2022-11-17 PROCEDURE — 73130 X-RAY EXAM OF HAND: CPT | Mod: 26,LT,, | Performed by: RADIOLOGY

## 2022-11-17 PROCEDURE — 99213 PR OFFICE/OUTPT VISIT, EST, LEVL III, 20-29 MIN: ICD-10-PCS | Mod: 25,S$GLB,, | Performed by: STUDENT IN AN ORGANIZED HEALTH CARE EDUCATION/TRAINING PROGRAM

## 2022-11-17 PROCEDURE — 29075 APPL CST ELBW FNGR SHORT ARM: CPT | Mod: LT,S$GLB,, | Performed by: STUDENT IN AN ORGANIZED HEALTH CARE EDUCATION/TRAINING PROGRAM

## 2022-11-17 PROCEDURE — 99999 PR PBB SHADOW E&M-EST. PATIENT-LVL IV: ICD-10-PCS | Mod: PBBFAC,,, | Performed by: STUDENT IN AN ORGANIZED HEALTH CARE EDUCATION/TRAINING PROGRAM

## 2022-11-17 PROCEDURE — 1159F PR MEDICATION LIST DOCUMENTED IN MEDICAL RECORD: ICD-10-PCS | Mod: CPTII,S$GLB,, | Performed by: STUDENT IN AN ORGANIZED HEALTH CARE EDUCATION/TRAINING PROGRAM

## 2022-11-17 PROCEDURE — 99213 OFFICE O/P EST LOW 20 MIN: CPT | Mod: 25,S$GLB,, | Performed by: STUDENT IN AN ORGANIZED HEALTH CARE EDUCATION/TRAINING PROGRAM

## 2022-11-17 PROCEDURE — 3044F PR MOST RECENT HEMOGLOBIN A1C LEVEL <7.0%: ICD-10-PCS | Mod: CPTII,S$GLB,, | Performed by: STUDENT IN AN ORGANIZED HEALTH CARE EDUCATION/TRAINING PROGRAM

## 2022-11-17 PROCEDURE — 99999 PR PBB SHADOW E&M-EST. PATIENT-LVL IV: CPT | Mod: PBBFAC,,, | Performed by: STUDENT IN AN ORGANIZED HEALTH CARE EDUCATION/TRAINING PROGRAM

## 2022-11-17 PROCEDURE — 73130 XR HAND COMPLETE 3 VIEW LEFT: ICD-10-PCS | Mod: 26,LT,, | Performed by: RADIOLOGY

## 2022-11-17 PROCEDURE — 3044F HG A1C LEVEL LT 7.0%: CPT | Mod: CPTII,S$GLB,, | Performed by: STUDENT IN AN ORGANIZED HEALTH CARE EDUCATION/TRAINING PROGRAM

## 2022-11-17 NOTE — PATIENT INSTRUCTIONS
Assessment:  Kayla Lowery is a 52 y.o. female with a chief complaint of Pain, Swelling, and Injury of the Left Hand (Fracture 11/9/22)      Encounter Diagnosis   Name Primary?    Displaced fracture of base of fourth metacarpal bone, left hand, initial encounter for closed fracture Yes        Plan:  XR reviewed today, mildly displaced oblique fx for the 4th metacarpal, without any significant angulation or rotation  The patient's history, clinical exam, and imaging findings are consistent with a mildly displaced 4th MC shaft fracture.  We recommend cast immobization for at least the next 4 weeks - short arm plaster cast applied today  Return for XR in the cast in 2 weeks, no physician appointment necessary at that time, will call with any abnormal results/shifting/displacement, etc.    Follow-up: 4 weeks with repeat XR or sooner if there are any problems between now and then.    Under the direction of Dr. Dean, 15 minutes were spent applying a short arm cast today by a cast technician.     Thank you for choosing [facility] and Dr. Asad Dean for your orthopedic & sports medicine care. It is our goal to provide you with exceptional care that will help keep you healthy, active, and get you back in the game.    Please do not hesitate to reach out to us via email, phone, or MyChart with any questions, concerns, or feedback.    If you are experiencing pain/discomfort ,or have questions after 5pm and would like to be connected to the [facility] on-call team, please call this number and specify which Sports Medicine provider is treating you: [phone]

## 2022-11-17 NOTE — PROGRESS NOTES
Patient ID: Kayla Lowery  YOB: 1970  MRN: [id number]    Chief Complaint: Pain, Swelling, and Injury of the Left Hand (Fracture 11/9/22)    Referred By: [facility]    Occupation: Physician    History of Present Illness: Kayla Lowery is a right-hand dominant 52 y.o. female who presents today with left finger injury.     She injured her left 4th finger on 11/9/22 when she was loading luggage in an overhead bin on an airplane.  A suitcase fell and she tried to catch it, causing her finger to bend backwards.  She had significant pain and treated conservatively since she was still able to move it, hoping the pain would resolve with time.  She used voltaren cream.  She sought care with her PCP on 11/17/22, where XR demonstrated a 4th metacarpal shaft fracture.  She presents today for orthopedic consult.    She complains of pain with typing, gripping, movement and direct pressure.  She has noticed swelling and bruising in her hand.    Past Medical History:   Past Medical History:   Diagnosis Date    ADHD (attention deficit hyperactivity disorder)     Anxiety     History of abnormal cervical Pap smear 2012    Hypertension     Migraines     Narcolepsy     Rosacea      History reviewed. No pertinent surgical history.  Family History   Problem Relation Age of Onset    Hypertension Mother     Heart disease Mother     Parkinsonism Mother     Coronary artery disease Mother     Diabetes Mother     Hyperlipidemia Mother     No Known Problems Father     Bladder Cancer Maternal Grandfather     Hypertension Paternal Grandfather     Heart disease Paternal Grandfather     Prostate cancer Paternal Grandfather     Macular degeneration Maternal Aunt      Social History     Socioeconomic History    Marital status:    Tobacco Use    Smoking status: Never    Smokeless tobacco: Never   Substance and Sexual Activity    Alcohol use: Yes     Comment: occasionally    Drug use: Never    Sexual activity:  Yes     Partners: Male     Medication List with Changes/Refills   Current Medications    CETIRIZINE (ZYRTEC) 10 MG TABLET    Take 10 mg by mouth once daily.    DOXYCYCLINE 50 MG CAPSULE    Take 1 capsule (50 mg total) by mouth once daily. With food. Avoid lying down for 1 hour after taking. Avoid the sun.    FLUOXETINE 20 MG TABLET    Take 1 tablet (20 mg total) by mouth once daily.    METHYLPHENIDATE HCL (RITALIN) 10 MG TABLET    Take 1 tablet (10 mg total) by mouth 2 (two) times daily.    METHYLPHENIDATE HCL 18 MG CR TABLET    Take 1 tablet (18 mg total) by mouth every morning.    METRONIDAZOLE 0.75% (METROCREAM) 0.75 % CREA    Apply topically 2 (two) times daily.    ONDANSETRON (ZOFRAN) 8 MG TABLET    Take 1 tablet (8 mg total) by mouth every 8 (eight) hours as needed.    OXYMETAZOLINE (RHOFADE) 1 % CREA    Apply daily to affected area    PROPRANOLOL (INDERAL) 10 MG TABLET    Take 1 tablet (10 mg total) by mouth 2 (two) times daily as needed.    RIZATRIPTAN (MAXALT-MLT) 10 MG DISINTEGRATING TABLET    Take 1 tablet (10 mg total) by mouth as needed for Migraine.    TACROLIMUS (PROTOPIC) 0.1 % OINTMENT    Apply topically 2 (two) times daily.    VALACYCLOVIR (VALTREX) 1000 MG TABLET    Take 2 tablets by mouth twice daily for 2 days as needed first signs of outbreak    XYWAV 0.5 GRAM/ML SOLN         Review of patient's allergies indicates:   Allergen Reactions    Gold sodium thiosulfate Dermatitis, Edema, Itching, Rash and Swelling    Imidazolidinyl urea Dermatitis, Itching, Rash and Swelling    Codeine phosphate     Minocycline Other (See Comments)     dizziness    Tobramycin-lotepred        Physical Exam:   There is no height or weight on file to calculate BMI.    Physical Exam  Constitutional:       General: She is not in acute distress.     Appearance: Normal appearance.   HENT:      Head: Normocephalic and atraumatic.   Eyes:      Extraocular Movements: Extraocular movements intact.      Conjunctiva/sclera:  Conjunctivae normal.   Pulmonary:      Effort: Pulmonary effort is normal. No respiratory distress.   Skin:     General: Skin is warm and dry.   Neurological:      General: No focal deficit present.      Mental Status: She is alert and oriented to person, place, and time.   Psychiatric:         Mood and Affect: Mood normal.         Detailed MSK exam:     Left Hand:  Inspection: Swelling, ecchymosis  Palpation: Tender to palpation 4th MC shaft  Range of motion: Limited ROM     No scissoring with finger flexion  Intact extensor pollicis longus, flexor pollicis longus, finger flexion, finger extension, finger abduction and adduction. Sensation intact to radial, median, ulnar, and axillary nerve distributions. Hand warm and well perfused with capillary refill of less than 2 seconds, and palpable distal radial pulses.       Imaging:  X-Ray Hand 3 view Left  Narrative: EXAMINATION:  XR HAND COMPLETE 3 VIEW LEFT    CLINICAL HISTORY:  . Unspecified injury of left wrist, hand and finger(s), initial encounter    TECHNIQUE:  PA, lateral, and oblique views of the left hand were performed.    COMPARISON:  None    FINDINGS:  AP, oblique and lateral radiographs of the left hand demonstrate cortical lucency and irregularity consistent with angular fracture involving the shaft of the 4th metacarpal.  There does appear to be associated soft tissue swelling.  No evidence for intra-articular extension.  Joint spaces are well preserved.  Impression: Fracture involving the midshaft of the 4th metacarpal    Electronically signed by: Ronal Gonzales MD  Date:    11/17/2022  Time:    09:24      Relevant imaging results were reviewed and interpreted by me and per my read: mildly displaced oblique fracture of the 4th metacarpal with minimal apex dorsal angulation.    This was discussed with the patient and / or family today.       Patient Instructions   Assessment:  Kayla Lowery is a 52 y.o. female with a chief complaint of Pain, Swelling,  and Injury of the Left Hand (Fracture 11/9/22)      Encounter Diagnosis   Name Primary?    Displaced fracture of base of fourth metacarpal bone, left hand, initial encounter for closed fracture Yes        Plan:  XR reviewed today, mildly displaced oblique fx for the 4th metacarpal, without any significant angulation or rotation  The patient's history, clinical exam, and imaging findings are consistent with a mildly displaced 4th MC shaft fracture.  We recommend cast immobization for at least the next 4 weeks.  Return for XR in the cast in 2 weeks, no physician appointment necessary at that time, will call with any abnormal results/shifting/displacement, etc.    Follow-up: 4 weeks with repeat XR or sooner if there are any problems between now and then.    Under the direction of Dr. Dean, 15 minutes were spent applying a short arm cast today by a cast technician.     Thank you for choosing [facility] and Dr. Asad Dean for your orthopedic & sports medicine care. It is our goal to provide you with exceptional care that will help keep you healthy, active, and get you back in the game.    Please do not hesitate to reach out to us via email, phone, or MyChart with any questions, concerns, or feedback.    If you are experiencing pain/discomfort ,or have questions after 5pm and would like to be connected to the [facility] on-call team, please call this number and specify which Sports Medicine provider is treating you: [phone]          A copy of today's visit note has been sent to the referring provider.       Asad Dean MD  Primary Care Sports Medicine        Disclaimer: This note was prepared using a voice recognition system and is likely to have sound alike errors within the text.         An addendum has been made to the medical record to reflect the services provided.  The addendum is signed and bears the current date, time, and reason for the  additional information being added to the medical record.    Short-arm plaster cast applied to L hand/wrist  11/22/2022  2:02 PM  Asad Dean MD  Primary Care Sports Medicine

## 2022-12-01 ENCOUNTER — HOSPITAL ENCOUNTER (OUTPATIENT)
Dept: RADIOLOGY | Facility: HOSPITAL | Age: 52
Discharge: HOME | End: 2022-12-01
Attending: STUDENT IN AN ORGANIZED HEALTH CARE EDUCATION/TRAINING PROGRAM
Payer: COMMERCIAL

## 2022-12-01 DIAGNOSIS — S62.315A DISPLACED FRACTURE OF BASE OF FOURTH METACARPAL BONE, LEFT HAND, INITIAL ENCOUNTER FOR CLOSED FRACTURE: ICD-10-CM

## 2022-12-01 PROCEDURE — 73130 X-RAY EXAM OF HAND: CPT | Mod: TC,LT

## 2022-12-01 PROCEDURE — 73130 X-RAY EXAM OF HAND: CPT | Mod: 26,LT,, | Performed by: RADIOLOGY

## 2022-12-01 PROCEDURE — 73130 XR HAND COMPLETE 3 VIEW LEFT: ICD-10-PCS | Mod: 26,LT,, | Performed by: RADIOLOGY

## 2022-12-07 NOTE — PROGRESS NOTES
Pulmonary Outpatient  Visit     Subjective:       Patient ID: Kayla Lowery is a 52 y.o. female.    Chief Complaint: Fatigue and narcolepsy      Kayla Lowery is 52 y.o.  Physician: [facility]  Dx Narcolepsy followed by Dr Steve GIPSON  Son rosy has narcolepsy  Xywav and methylphenidate  Sleep study was done in 2006:   Displaced here after [place]  Likely had narcolepsy since 12 years, Had ADHD  Since will fall asleep during tests  Seen psychologist and neurologist  Bed time: 9-0930pm  Wake time: 0430 am  SOL 15 mins  No problems with Meds  Notes reviewed  Had tremor  Sleep paralysis and hallucination Last event  2014    Last event: cannot remember  No MVA     Notes reveiwed  1. Primary narcolepsy without cataplexy     PLAN    Kayla was seen today for narcolepsy without cataplexy.    Diagnoses and all orders for this visit:    Primary narcolepsy without cataplexy    1. Do Not Drive if you are sleepy!  2. Will continue xywav at 3.75g and 3 grams at night.Will change the dosing with new rx.   3. Continue methylphenidate 18 mg po daily. And ritalin 10 mg BID Prn.    Electronically signed by Rodo Wilson MD at 09/14/2021 1:42 PM CDT          08/11/2022  Last 04/26/2022  Doing well on Xywav 1 st dose 3.75gm and 2nd dose 3 gm  Wake up 05:30 am  Doing well in day time   Takes concerta in AM  Lasts 6 hrs  methyphenolate x 2 in PM  Inderal for tachy and tremors    12/08/2022  Last visit 08/11/2022  Doing well on Xywav 1 st dose 3.75gm and 2nd dose 3 gm  Wake up 05:30 am  Doing well in day time   Takes concerta in AM  Lasts 6 hrs  methyphenolate x 2 in PM  Inderal for tachy and tremors  Recent left hand #  Consider BRAND CONCERTA  Asked about WAKIX: will interact with PROZAC            The following portions of the patient's history were reviewed and updated as appropriate:   She  has a past medical history of ADHD (attention deficit hyperactivity disorder), Anxiety,  History of abnormal cervical Pap smear (2012), Hypertension, Migraines, Narcolepsy, and Rosacea.  She does not have any pertinent problems on file.  She  has no past surgical history on file.  Her family history includes Bladder Cancer in her maternal grandfather; Coronary artery disease in her mother; Diabetes in her mother; Heart disease in her mother and paternal grandfather; Hyperlipidemia in her mother; Hypertension in her mother and paternal grandfather; Macular degeneration in her maternal aunt; No Known Problems in her father; Parkinsonism in her mother; Prostate cancer in her paternal grandfather.  She  reports that she has never smoked. She has never used smokeless tobacco. She reports current alcohol use. She reports that she does not use drugs.  She has a current medication list which includes the following prescription(s): cetirizine, doxycycline, fluoxetine, methylphenidate hcl, methylphenidate hcl, metronidazole 0.75%, ondansetron, oxymetazoline, propranolol, rizatriptan, tacrolimus, valacyclovir, and xywav.  Current Outpatient Medications on File Prior to Visit   Medication Sig Dispense Refill    cetirizine (ZYRTEC) 10 MG tablet Take 10 mg by mouth once daily.      doxycycline 50 MG capsule Take 1 capsule (50 mg total) by mouth once daily. With food. Avoid lying down for 1 hour after taking. Avoid the sun. 30 capsule 5    FLUoxetine 20 MG tablet Take 1 tablet (20 mg total) by mouth once daily. 90 tablet 1    methylphenidate HCl (RITALIN) 10 MG tablet Take 1 tablet (10 mg total) by mouth 2 (two) times daily. 120 tablet 0    methylphenidate HCl 18 MG CR tablet Take 1 tablet (18 mg total) by mouth every morning. 90 tablet 0    metronidazole 0.75% (METROCREAM) 0.75 % Crea Apply topically 2 (two) times daily. 45 g 3    ondansetron (ZOFRAN) 8 MG tablet Take 1 tablet (8 mg total) by mouth every 8 (eight) hours as needed. 30 tablet 0    oxymetazoline (RHOFADE) 1 % Crea Apply daily to affected area 30 g 11     propranoloL (INDERAL) 10 MG tablet Take 1 tablet (10 mg total) by mouth 2 (two) times daily as needed. 90 tablet 3    rizatriptan (MAXALT-MLT) 10 MG disintegrating tablet Take 1 tablet (10 mg total) by mouth as needed for Migraine. 9 tablet 1    tacrolimus (PROTOPIC) 0.1 % ointment Apply topically 2 (two) times daily. 60 g 2    valACYclovir (VALTREX) 1000 MG tablet Take 2 tablets by mouth twice daily for 2 days as needed first signs of outbreak 30 tablet 3    XYWAV 0.5 gram/mL Soln Take 3.75 g by mouth 2 (two) times a day. 1st dose: @ 8:30 pm, 3 g @ 12 MN       No current facility-administered medications on file prior to visit.     She is allergic to gold sodium thiosulfate, imidazolidinyl urea, codeine phosphate, minocycline, and tobramycin-lotepred..      Review of Systems   All other systems reviewed and are negative.    Outpatient Encounter Medications as of 12/8/2022   Medication Sig Dispense Refill    cetirizine (ZYRTEC) 10 MG tablet Take 10 mg by mouth once daily.      doxycycline 50 MG capsule Take 1 capsule (50 mg total) by mouth once daily. With food. Avoid lying down for 1 hour after taking. Avoid the sun. 30 capsule 5    FLUoxetine 20 MG tablet Take 1 tablet (20 mg total) by mouth once daily. 90 tablet 1    methylphenidate HCl (RITALIN) 10 MG tablet Take 1 tablet (10 mg total) by mouth 2 (two) times daily. 120 tablet 0    methylphenidate HCl 18 MG CR tablet Take 1 tablet (18 mg total) by mouth every morning. 90 tablet 0    metronidazole 0.75% (METROCREAM) 0.75 % Crea Apply topically 2 (two) times daily. 45 g 3    ondansetron (ZOFRAN) 8 MG tablet Take 1 tablet (8 mg total) by mouth every 8 (eight) hours as needed. 30 tablet 0    oxymetazoline (RHOFADE) 1 % Crea Apply daily to affected area 30 g 11    propranoloL (INDERAL) 10 MG tablet Take 1 tablet (10 mg total) by mouth 2 (two) times daily as needed. 90 tablet 3    rizatriptan (MAXALT-MLT) 10 MG disintegrating tablet Take 1 tablet (10 mg total) by mouth  "as needed for Migraine. 9 tablet 1    tacrolimus (PROTOPIC) 0.1 % ointment Apply topically 2 (two) times daily. 60 g 2    valACYclovir (VALTREX) 1000 MG tablet Take 2 tablets by mouth twice daily for 2 days as needed first signs of outbreak 30 tablet 3    XYWAV 0.5 gram/mL Soln Take 3.75 g by mouth 2 (two) times a day. 1st dose: @ 8:30 pm, 3 g @ 12 MN       No facility-administered encounter medications on file as of 12/8/2022.       Objective:     Vital Signs (Most Recent)  Vital Signs  Pulse: 70  Resp: 18  SpO2: 100 %  BP: 122/74  Height and Weight  Height: 5' 2" (157.5 cm)  Weight: 45.9 kg (101 lb 3.1 oz)  BSA (Calculated - sq m): 1.42 sq meters  BMI (Calculated): 18.5  Weight in (lb) to have BMI = 25: 136.4]  Wt Readings from Last 2 Encounters:   12/08/22 45.9 kg (101 lb 3.1 oz)   08/16/22 47.2 kg (104 lb)       Physical Exam   Constitutional: She appears well-developed and well-nourished.   Nursing note and vitals reviewed.    Laboratory  Lab Results   Component Value Date    WBC 6.40 01/11/2022    RBC 4.57 01/11/2022    HGB 14.2 01/11/2022    HCT 41.8 01/11/2022    MCV 92 01/11/2022    MCH 31.1 (H) 01/11/2022    MCHC 34.0 01/11/2022    RDW 12.2 01/11/2022     01/11/2022    MPV 9.6 01/11/2022    GRAN 2.7 02/06/2007    GRAN 55.9 02/06/2007    LYMPH 35.6 02/06/2007    LYMPH 1.8 02/06/2007    MONO 6.7 02/06/2007    MONO 0.3 02/06/2007    EOS 0.1 02/06/2007    BASO 0.0 02/06/2007    EOSINOPHIL 1.4 02/06/2007    BASOPHIL 0.4 02/06/2007       BMP  Lab Results   Component Value Date     01/11/2022    K 3.7 01/11/2022     01/11/2022    CO2 29 01/11/2022    BUN 16 01/11/2022    CREATININE 0.8 01/11/2022    CALCIUM 9.5 01/11/2022    ANIONGAP 9 01/11/2022    ESTGFRAFRICA >60 01/11/2022    EGFRNONAA >60 01/11/2022    AST 23 01/11/2022    ALT 19 01/11/2022    PROT 6.9 01/11/2022       No results found for: BNP    Lab Results   Component Value Date    TSH 1.470 01/11/2022       No results found for: " SEDRATE    No results found for: CRP  No results found for: IGE     No results found for: ASPERGILLUS  No results found for: AFUMIGATUSCL     No results found for: ACE      Assessment/Plan:     Problem List Items Addressed This Visit       Narcolepsy - Primary     [place]: 6  Night : XYWAV  Day time : CONCERTA, methyphenodate         Hypertension     Stable on INDERAL            Follow up in about 5 months (around 5/8/2023), or xywav refill.    This note was prepared using voice recognition system and is likely to have sound alike errors that may have been overlooked even after proof reading.  Please call me with any questions    Discussed diagnosis, its evaluation, treatment and usual course. All questions answered.      Scott Rachel MD

## 2022-12-08 ENCOUNTER — OFFICE VISIT (OUTPATIENT)
Dept: SLEEP MEDICINE | Facility: CLINIC | Age: 52
End: 2022-12-08
Payer: COMMERCIAL

## 2022-12-08 ENCOUNTER — PATIENT MESSAGE (OUTPATIENT)
Dept: INTERNAL MEDICINE | Facility: CLINIC | Age: 52
End: 2022-12-08
Payer: COMMERCIAL

## 2022-12-08 VITALS
HEART RATE: 70 BPM | OXYGEN SATURATION: 100 % | DIASTOLIC BLOOD PRESSURE: 74 MMHG | WEIGHT: 101.19 LBS | HEIGHT: 62 IN | BODY MASS INDEX: 18.62 KG/M2 | RESPIRATION RATE: 18 BRPM | SYSTOLIC BLOOD PRESSURE: 122 MMHG

## 2022-12-08 DIAGNOSIS — G47.421 NARCOLEPSY DUE TO UNDERLYING CONDITION WITH CATAPLEXY: Primary | ICD-10-CM

## 2022-12-08 DIAGNOSIS — I10 PRIMARY HYPERTENSION: ICD-10-CM

## 2022-12-08 PROCEDURE — 1160F PR REVIEW ALL MEDS BY PRESCRIBER/CLIN PHARMACIST DOCUMENTED: ICD-10-PCS | Mod: CPTII,S$GLB,, | Performed by: INTERNAL MEDICINE

## 2022-12-08 PROCEDURE — 99999 PR PBB SHADOW E&M-EST. PATIENT-LVL IV: ICD-10-PCS | Mod: PBBFAC,,, | Performed by: INTERNAL MEDICINE

## 2022-12-08 PROCEDURE — 99999 PR PBB SHADOW E&M-EST. PATIENT-LVL IV: CPT | Mod: PBBFAC,,, | Performed by: INTERNAL MEDICINE

## 2022-12-08 PROCEDURE — 1159F PR MEDICATION LIST DOCUMENTED IN MEDICAL RECORD: ICD-10-PCS | Mod: CPTII,S$GLB,, | Performed by: INTERNAL MEDICINE

## 2022-12-08 PROCEDURE — 3078F PR MOST RECENT DIASTOLIC BLOOD PRESSURE < 80 MM HG: ICD-10-PCS | Mod: CPTII,S$GLB,, | Performed by: INTERNAL MEDICINE

## 2022-12-08 PROCEDURE — 3044F HG A1C LEVEL LT 7.0%: CPT | Mod: CPTII,S$GLB,, | Performed by: INTERNAL MEDICINE

## 2022-12-08 PROCEDURE — 3074F SYST BP LT 130 MM HG: CPT | Mod: CPTII,S$GLB,, | Performed by: INTERNAL MEDICINE

## 2022-12-08 PROCEDURE — 99213 OFFICE O/P EST LOW 20 MIN: CPT | Mod: S$GLB,,, | Performed by: INTERNAL MEDICINE

## 2022-12-08 PROCEDURE — 3044F PR MOST RECENT HEMOGLOBIN A1C LEVEL <7.0%: ICD-10-PCS | Mod: CPTII,S$GLB,, | Performed by: INTERNAL MEDICINE

## 2022-12-08 PROCEDURE — 1160F RVW MEDS BY RX/DR IN RCRD: CPT | Mod: CPTII,S$GLB,, | Performed by: INTERNAL MEDICINE

## 2022-12-08 PROCEDURE — 99213 PR OFFICE/OUTPT VISIT, EST, LEVL III, 20-29 MIN: ICD-10-PCS | Mod: S$GLB,,, | Performed by: INTERNAL MEDICINE

## 2022-12-08 PROCEDURE — 3074F PR MOST RECENT SYSTOLIC BLOOD PRESSURE < 130 MM HG: ICD-10-PCS | Mod: CPTII,S$GLB,, | Performed by: INTERNAL MEDICINE

## 2022-12-08 PROCEDURE — 3008F BODY MASS INDEX DOCD: CPT | Mod: CPTII,S$GLB,, | Performed by: INTERNAL MEDICINE

## 2022-12-08 PROCEDURE — 3008F PR BODY MASS INDEX (BMI) DOCUMENTED: ICD-10-PCS | Mod: CPTII,S$GLB,, | Performed by: INTERNAL MEDICINE

## 2022-12-08 PROCEDURE — 3078F DIAST BP <80 MM HG: CPT | Mod: CPTII,S$GLB,, | Performed by: INTERNAL MEDICINE

## 2022-12-08 PROCEDURE — 1159F MED LIST DOCD IN RCRD: CPT | Mod: CPTII,S$GLB,, | Performed by: INTERNAL MEDICINE

## 2022-12-13 ENCOUNTER — HOSPITAL ENCOUNTER (OUTPATIENT)
Dept: RADIOLOGY | Facility: HOSPITAL | Age: 52
Discharge: HOME | End: 2022-12-13
Attending: STUDENT IN AN ORGANIZED HEALTH CARE EDUCATION/TRAINING PROGRAM
Payer: COMMERCIAL

## 2022-12-13 ENCOUNTER — PATIENT MESSAGE (OUTPATIENT)
Dept: SLEEP MEDICINE | Facility: CLINIC | Age: 52
End: 2022-12-13
Payer: COMMERCIAL

## 2022-12-13 ENCOUNTER — OFFICE VISIT (OUTPATIENT)
Dept: SPORTS MEDICINE | Facility: CLINIC | Age: 52
End: 2022-12-13
Payer: COMMERCIAL

## 2022-12-13 DIAGNOSIS — S62.315D: Primary | ICD-10-CM

## 2022-12-13 DIAGNOSIS — S62.315A DISPLACED FRACTURE OF BASE OF FOURTH METACARPAL BONE, LEFT HAND, INITIAL ENCOUNTER FOR CLOSED FRACTURE: ICD-10-CM

## 2022-12-13 DIAGNOSIS — M79.642 LEFT HAND PAIN: Primary | ICD-10-CM

## 2022-12-13 PROCEDURE — 99213 PR OFFICE/OUTPT VISIT, EST, LEVL III, 20-29 MIN: ICD-10-PCS | Mod: S$GLB,,, | Performed by: STUDENT IN AN ORGANIZED HEALTH CARE EDUCATION/TRAINING PROGRAM

## 2022-12-13 PROCEDURE — 99999 PR PBB SHADOW E&M-EST. PATIENT-LVL III: CPT | Mod: PBBFAC,,, | Performed by: STUDENT IN AN ORGANIZED HEALTH CARE EDUCATION/TRAINING PROGRAM

## 2022-12-13 PROCEDURE — 73130 XR HAND COMPLETE 3 VIEW LEFT: ICD-10-PCS | Mod: 26,LT,, | Performed by: RADIOLOGY

## 2022-12-13 PROCEDURE — 73130 X-RAY EXAM OF HAND: CPT | Mod: 26,LT,, | Performed by: RADIOLOGY

## 2022-12-13 PROCEDURE — 73130 X-RAY EXAM OF HAND: CPT | Mod: TC,LT

## 2022-12-13 PROCEDURE — 3044F PR MOST RECENT HEMOGLOBIN A1C LEVEL <7.0%: ICD-10-PCS | Mod: CPTII,S$GLB,, | Performed by: STUDENT IN AN ORGANIZED HEALTH CARE EDUCATION/TRAINING PROGRAM

## 2022-12-13 PROCEDURE — 1159F PR MEDICATION LIST DOCUMENTED IN MEDICAL RECORD: ICD-10-PCS | Mod: CPTII,S$GLB,, | Performed by: STUDENT IN AN ORGANIZED HEALTH CARE EDUCATION/TRAINING PROGRAM

## 2022-12-13 PROCEDURE — 3044F HG A1C LEVEL LT 7.0%: CPT | Mod: CPTII,S$GLB,, | Performed by: STUDENT IN AN ORGANIZED HEALTH CARE EDUCATION/TRAINING PROGRAM

## 2022-12-13 PROCEDURE — 99213 OFFICE O/P EST LOW 20 MIN: CPT | Mod: S$GLB,,, | Performed by: STUDENT IN AN ORGANIZED HEALTH CARE EDUCATION/TRAINING PROGRAM

## 2022-12-13 PROCEDURE — 1159F MED LIST DOCD IN RCRD: CPT | Mod: CPTII,S$GLB,, | Performed by: STUDENT IN AN ORGANIZED HEALTH CARE EDUCATION/TRAINING PROGRAM

## 2022-12-13 PROCEDURE — 99999 PR PBB SHADOW E&M-EST. PATIENT-LVL III: ICD-10-PCS | Mod: PBBFAC,,, | Performed by: STUDENT IN AN ORGANIZED HEALTH CARE EDUCATION/TRAINING PROGRAM

## 2022-12-13 NOTE — PROGRESS NOTES
Patient ID: Kayla Lowery  YOB: 1970  MRN: [id number]    Chief Complaint: Follow-up (L fourth metacarpal fracture)      Referred By: [facility] physician     Occupation: Physician      History of Present Illness: Kayla Lowery is a left-hand dominant 52 y.o. female who presents today with 2/10 left hand pain at her follow up for a left 4th metacarpal fracture.     She was initially evaluated on 11/17/22. She injured her left 4th finger on 11/9/22 when she was loading luggage in an overhead bin on an airplane.  A suitcase fell and she tried to catch it, causing her finger to bend backwards.  She had significant pain and treated conservatively since she was still able to move it, hoping the pain would resolve with time.  She used voltaren cream.  She sought care with her PCP on 11/17/22, where XR demonstrated a 4th metacarpal shaft fracture.  She presents today for orthopedic consult. She complains of pain with typing, gripping, movement and direct pressure.  She has noticed swelling and bruising in her hand.    Today, she describes her pain as a dull ache. If she hits her fingers, there is a sharp pain down her hand. Her pain is located on the ulnar aspect of her hand.    The patient is active in none.      Past Medical History:   Past Medical History:   Diagnosis Date    ADHD (attention deficit hyperactivity disorder)     Anxiety     History of abnormal cervical Pap smear 2012    Hypertension     Migraines     Narcolepsy     Rosacea      History reviewed. No pertinent surgical history.  Family History   Problem Relation Age of Onset    Hypertension Mother     Heart disease Mother     Parkinsonism Mother     Coronary artery disease Mother     Diabetes Mother     Hyperlipidemia Mother     No Known Problems Father     Bladder Cancer Maternal Grandfather     Hypertension Paternal Grandfather     Heart disease Paternal Grandfather     Prostate cancer Paternal Grandfather     Macular degeneration  Maternal Aunt      Social History     Socioeconomic History    Marital status:    Tobacco Use    Smoking status: Never    Smokeless tobacco: Never   Substance and Sexual Activity    Alcohol use: Yes     Comment: occasionally    Drug use: Never    Sexual activity: Yes     Partners: Male     Medication List with Changes/Refills   Current Medications    CETIRIZINE (ZYRTEC) 10 MG TABLET    Take 10 mg by mouth once daily.    FLUOXETINE 20 MG TABLET    Take 1 tablet (20 mg total) by mouth once daily.    METHYLPHENIDATE HCL (RITALIN) 10 MG TABLET    Take 1 tablet (10 mg total) by mouth 2 (two) times daily.    METHYLPHENIDATE HCL 18 MG CR TABLET    Take 1 tablet (18 mg total) by mouth every morning.    METRONIDAZOLE 0.75% (METROCREAM) 0.75 % CREA    Apply topically 2 (two) times daily.    ONDANSETRON (ZOFRAN) 8 MG TABLET    Take 1 tablet (8 mg total) by mouth every 8 (eight) hours as needed.    OXYMETAZOLINE (RHOFADE) 1 % CREA    Apply daily to affected area    PROPRANOLOL (INDERAL) 10 MG TABLET    Take 1 tablet (10 mg total) by mouth 2 (two) times daily as needed.    RIZATRIPTAN (MAXALT-MLT) 10 MG DISINTEGRATING TABLET    Take 1 tablet (10 mg total) by mouth as needed for Migraine.    TACROLIMUS (PROTOPIC) 0.1 % OINTMENT    Apply topically 2 (two) times daily.    VALACYCLOVIR (VALTREX) 1000 MG TABLET    Take 2 tablets by mouth twice daily for 2 days as needed first signs of outbreak    XYWAV 0.5 GRAM/ML SOLN    Take 3.75 g by mouth 2 (two) times a day. 1st dose: @ 8:30 pm, 3 g @ 12 MN     Review of patient's allergies indicates:   Allergen Reactions    Gold sodium thiosulfate Dermatitis, Edema, Itching, Rash and Swelling    Imidazolidinyl urea Dermatitis, Itching, Rash and Swelling    Codeine phosphate     Minocycline Other (See Comments)     dizziness    Tobramycin-lotepred        Physical Exam:   There is no height or weight on file to calculate BMI.    Physical Exam  Constitutional:       General: She is not in  acute distress.     Appearance: Normal appearance.   HENT:      Head: Normocephalic and atraumatic.   Eyes:      Extraocular Movements: Extraocular movements intact.      Conjunctiva/sclera: Conjunctivae normal.   Pulmonary:      Effort: Pulmonary effort is normal. No respiratory distress.   Skin:     General: Skin is warm and dry.   Neurological:      General: No focal deficit present.      Mental Status: She is alert and oriented to person, place, and time.   Psychiatric:         Mood and Affect: Mood normal.         Detailed MSK exam:   General    Constitutional: She is oriented to person, place, and time. No distress.   HENT:   Head: Normocephalic and atraumatic.   Eyes: Conjunctivae are normal.   Pulmonary/Chest: Effort normal. No respiratory distress.   Abdominal: Normal appearance.   Neurological: She is alert and oriented to person, place, and time.   Psychiatric: Mood normal.         Left Hand/Wrist Exam     Inspection   Effusion:  Hand -  absent    Range of Motion     Wrist   Extension:  normal   Flexion:  normal   Pronation:  normal   Supination:  normal     Comments:  Mild TTP at the 4th MC shaft          Muscle Strength   Left Upper Extremity  Wrist extension: 5/5   Wrist flexion: 5/5   :  5/5 (5-/5)        Imaging:  X-Ray Hand 3 view Left  Narrative: EXAMINATION:  XR HAND COMPLETE 3 VIEW LEFT    CLINICAL HISTORY:  . Displaced fracture of base of fourth metacarpal bone, left hand, initial encounter for closed fracture    TECHNIQUE:  PA, lateral, and oblique views of the left hand were performed.    COMPARISON:  12/01/2022.    FINDINGS:  Healing subacute comminuted oblique fracture involving the shaft of the 4th metacarpal.  There is sclerosis and callus formation consistent with healing.  Interval removal of the overlying splint.    Remaining visualized bones of the hands are intact.  Impression: Healing subacute fracture of the 4th metacarpal.    Electronically signed by: Tino  Orange  Date:    12/13/2022  Time:    16:22      Relevant imaging results were reviewed and interpreted by me and per my read: interval healing of her oblique 4th metacarpal fracture, in stable alignment.      This was discussed with the patient and / or family today.       Patient Instructions   Assessment:  Kayla Lowery is a 52 y.o. female with a chief complaint of Follow-up (L fourth metacarpal fracture)    Encounter Diagnosis   Name Primary?    Displaced fracture of base of fourth metacarpal bone, left hand, subsequent encounter for fracture with routine healing Yes      Plan:  XRs reviewed today, with interval but incomplete radiographic healing of the 4th MC fracture, stable alignment, as well as incomplete clinical healing as still with some pain & tenderness to the area  Recommend continued immobilization, replace cast with ulnar gutter EXOS splint.  May remove for hygiene.    Follow-up: Cosmo 3 with XR or sooner if there are any problems between now and then.    Thank you for choosing [facility] and Dr. Asad Dean for your orthopedic & sports medicine care. It is our goal to provide you with exceptional care that will help keep you healthy, active, and get you back in the game.    Please do not hesitate to reach out to us via email, phone, or MyChart with any questions, concerns, or feedback.    If you are experiencing pain/discomfort ,or have questions after 5pm and would like to be connected to the [facility] on-call team, please call this number and specify which Sports Medicine provider is treating you: [phone]        Asad Dean MD  Primary Care Sports Medicine      Disclaimer: This note was prepared using a voice recognition system and is likely to have sound alike errors within the text.

## 2022-12-13 NOTE — PATIENT INSTRUCTIONS
Assessment:  Kayla Lowery is a 52 y.o. female with a chief complaint of Follow-up (L fourth metacarpal fracture)    Encounter Diagnosis   Name Primary?    Displaced fracture of base of fourth metacarpal bone, left hand, subsequent encounter for fracture with routine healing Yes      Plan:  XRs reviewed today, with interval but incomplete radiographic healing of the 4th MC fracture, stable alignment, as well as incomplete clinical healing as still with some pain & tenderness to the area  Recommend continued immobilization, replace cast with ulnar gutter EXOS splint.  May remove for hygiene.    Follow-up: Cosmo 3 with XR or sooner if there are any problems between now and then.    Thank you for choosing [facility] and Dr. Asad Dean for your orthopedic & sports medicine care. It is our goal to provide you with exceptional care that will help keep you healthy, active, and get you back in the game.    Please do not hesitate to reach out to us via email, phone, or MyChart with any questions, concerns, or feedback.    If you are experiencing pain/discomfort ,or have questions after 5pm and would like to be connected to the [facility] on-call team, please call this number and specify which Sports Medicine provider is treating you: [phone]

## 2022-12-27 ENCOUNTER — IMMUNIZATION (OUTPATIENT)
Dept: PRIMARY CARE CLINIC | Facility: CLINIC | Age: 52
End: 2022-12-27
Payer: COMMERCIAL

## 2022-12-27 DIAGNOSIS — Z23 NEED FOR VACCINATION: Primary | ICD-10-CM

## 2022-12-27 PROCEDURE — 91312 COVID-19, MRNA, LNP-S, BIVALENT BOOSTER, PF, 30 MCG/0.3 ML DOSE: CPT | Mod: PBBFAC | Performed by: FAMILY MEDICINE

## 2022-12-27 PROCEDURE — 0124A COVID-19, MRNA, LNP-S, BIVALENT BOOSTER, PF, 30 MCG/0.3 ML DOSE: CPT | Mod: CV19,PBBFAC | Performed by: FAMILY MEDICINE

## 2023-01-05 ENCOUNTER — OFFICE VISIT (OUTPATIENT)
Dept: INTERNAL MEDICINE | Facility: CLINIC | Age: 53
End: 2023-01-05
Payer: COMMERCIAL

## 2023-01-05 ENCOUNTER — CLINICAL SUPPORT (OUTPATIENT)
Dept: INTERNAL MEDICINE | Facility: CLINIC | Age: 53
End: 2023-01-05
Payer: COMMERCIAL

## 2023-01-05 ENCOUNTER — HOSPITAL ENCOUNTER (OUTPATIENT)
Dept: RADIOLOGY | Facility: HOSPITAL | Age: 53
Discharge: HOME | End: 2023-01-05
Attending: STUDENT IN AN ORGANIZED HEALTH CARE EDUCATION/TRAINING PROGRAM
Payer: COMMERCIAL

## 2023-01-05 ENCOUNTER — OFFICE VISIT (OUTPATIENT)
Dept: SPORTS MEDICINE | Facility: CLINIC | Age: 53
End: 2023-01-05
Payer: COMMERCIAL

## 2023-01-05 VITALS
TEMPERATURE: 97 F | SYSTOLIC BLOOD PRESSURE: 104 MMHG | WEIGHT: 101 LBS | DIASTOLIC BLOOD PRESSURE: 71 MMHG | HEIGHT: 62 IN | HEART RATE: 68 BPM | BODY MASS INDEX: 18.58 KG/M2

## 2023-01-05 DIAGNOSIS — Z00.00 ROUTINE GENERAL MEDICAL EXAMINATION AT A HEALTH CARE FACILITY: Primary | ICD-10-CM

## 2023-01-05 DIAGNOSIS — M79.642 LEFT HAND PAIN: ICD-10-CM

## 2023-01-05 DIAGNOSIS — S62.315D: Primary | ICD-10-CM

## 2023-01-05 DIAGNOSIS — I10 PRIMARY HYPERTENSION: ICD-10-CM

## 2023-01-05 DIAGNOSIS — G47.421 NARCOLEPSY DUE TO UNDERLYING CONDITION WITH CATAPLEXY: ICD-10-CM

## 2023-01-05 LAB
ALBUMIN SERPL BCP-MCNC: 4.2 G/DL (ref 3.5–5.2)
ALP SERPL-CCNC: 72 U/L (ref 55–135)
ALT SERPL W/O P-5'-P-CCNC: 25 U/L (ref 10–44)
ANION GAP SERPL CALC-SCNC: 8 MMOL/L (ref 8–16)
AST SERPL-CCNC: 22 U/L (ref 10–40)
BILIRUB SERPL-MCNC: 0.6 MG/DL (ref 0.1–1)
BUN SERPL-MCNC: 13 MG/DL (ref 6–20)
CALCIUM SERPL-MCNC: 9.6 MG/DL (ref 8.7–10.5)
CHLORIDE SERPL-SCNC: 102 MMOL/L (ref 95–110)
CHOLEST SERPL-MCNC: 156 MG/DL (ref 120–199)
CHOLEST/HDLC SERPL: 2.7 {RATIO} (ref 2–5)
CO2 SERPL-SCNC: 27 MMOL/L (ref 23–29)
CREAT SERPL-MCNC: 0.7 MG/DL (ref 0.5–1.4)
ERYTHROCYTE [DISTWIDTH] IN BLOOD BY AUTOMATED COUNT: 11.9 % (ref 11.5–14.5)
EST. GFR  (NO RACE VARIABLE): >60 ML/MIN/1.73 M^2
ESTIMATED AVG GLUCOSE: 103 MG/DL (ref 68–131)
GLUCOSE SERPL-MCNC: 87 MG/DL (ref 70–110)
HBA1C MFR BLD: 5.2 % (ref 4–5.6)
HCT VFR BLD AUTO: 38.8 % (ref 37–48.5)
HDLC SERPL-MCNC: 57 MG/DL (ref 40–75)
HDLC SERPL: 36.5 % (ref 20–50)
HGB BLD-MCNC: 13.6 G/DL (ref 12–16)
LDLC SERPL CALC-MCNC: 86 MG/DL (ref 63–159)
MAGNESIUM SERPL-MCNC: 1.7 MG/DL (ref 1.6–2.6)
MCH RBC QN AUTO: 30.9 PG (ref 27–31)
MCHC RBC AUTO-ENTMCNC: 35.1 G/DL (ref 32–36)
MCV RBC AUTO: 88 FL (ref 82–98)
NONHDLC SERPL-MCNC: 99 MG/DL
PLATELET # BLD AUTO: 321 K/UL (ref 150–450)
PMV BLD AUTO: 9.1 FL (ref 9.2–12.9)
POTASSIUM SERPL-SCNC: 4.4 MMOL/L (ref 3.5–5.1)
PROT SERPL-MCNC: 6.8 G/DL (ref 6–8.4)
RBC # BLD AUTO: 4.4 M/UL (ref 4–5.4)
SODIUM SERPL-SCNC: 137 MMOL/L (ref 136–145)
TRIGL SERPL-MCNC: 65 MG/DL (ref 30–150)
TSH SERPL DL<=0.005 MIU/L-ACNC: 0.96 UIU/ML (ref 0.4–4)
WBC # BLD AUTO: 5.45 K/UL (ref 3.9–12.7)

## 2023-01-05 PROCEDURE — 99396 PR PREVENTIVE VISIT,EST,40-64: ICD-10-PCS | Mod: S$GLB,,, | Performed by: INTERNAL MEDICINE

## 2023-01-05 PROCEDURE — 99999 PR PBB SHADOW E&M-EST. PATIENT-LVL III: CPT | Mod: PBBFAC,,, | Performed by: STUDENT IN AN ORGANIZED HEALTH CARE EDUCATION/TRAINING PROGRAM

## 2023-01-05 PROCEDURE — 99999 PR PBB SHADOW E&M-EST. PATIENT-LVL IV: CPT | Mod: PBBFAC,,, | Performed by: INTERNAL MEDICINE

## 2023-01-05 PROCEDURE — 85027 COMPLETE CBC AUTOMATED: CPT | Performed by: INTERNAL MEDICINE

## 2023-01-05 PROCEDURE — 1160F PR REVIEW ALL MEDS BY PRESCRIBER/CLIN PHARMACIST DOCUMENTED: ICD-10-PCS | Mod: CPTII,S$GLB,, | Performed by: STUDENT IN AN ORGANIZED HEALTH CARE EDUCATION/TRAINING PROGRAM

## 2023-01-05 PROCEDURE — 1159F PR MEDICATION LIST DOCUMENTED IN MEDICAL RECORD: ICD-10-PCS | Mod: CPTII,S$GLB,, | Performed by: STUDENT IN AN ORGANIZED HEALTH CARE EDUCATION/TRAINING PROGRAM

## 2023-01-05 PROCEDURE — 73130 X-RAY EXAM OF HAND: CPT | Mod: 26,LT,, | Performed by: STUDENT IN AN ORGANIZED HEALTH CARE EDUCATION/TRAINING PROGRAM

## 2023-01-05 PROCEDURE — 3008F PR BODY MASS INDEX (BMI) DOCUMENTED: ICD-10-PCS | Mod: CPTII,S$GLB,, | Performed by: INTERNAL MEDICINE

## 2023-01-05 PROCEDURE — 99396 PREV VISIT EST AGE 40-64: CPT | Mod: S$GLB,,, | Performed by: INTERNAL MEDICINE

## 2023-01-05 PROCEDURE — 99213 OFFICE O/P EST LOW 20 MIN: CPT | Mod: S$GLB,,, | Performed by: STUDENT IN AN ORGANIZED HEALTH CARE EDUCATION/TRAINING PROGRAM

## 2023-01-05 PROCEDURE — 1159F MED LIST DOCD IN RCRD: CPT | Mod: CPTII,S$GLB,, | Performed by: STUDENT IN AN ORGANIZED HEALTH CARE EDUCATION/TRAINING PROGRAM

## 2023-01-05 PROCEDURE — 1159F MED LIST DOCD IN RCRD: CPT | Mod: CPTII,S$GLB,, | Performed by: INTERNAL MEDICINE

## 2023-01-05 PROCEDURE — 84443 ASSAY THYROID STIM HORMONE: CPT | Performed by: INTERNAL MEDICINE

## 2023-01-05 PROCEDURE — 1160F RVW MEDS BY RX/DR IN RCRD: CPT | Mod: CPTII,S$GLB,, | Performed by: STUDENT IN AN ORGANIZED HEALTH CARE EDUCATION/TRAINING PROGRAM

## 2023-01-05 PROCEDURE — 3074F PR MOST RECENT SYSTOLIC BLOOD PRESSURE < 130 MM HG: ICD-10-PCS | Mod: CPTII,S$GLB,, | Performed by: INTERNAL MEDICINE

## 2023-01-05 PROCEDURE — 3078F PR MOST RECENT DIASTOLIC BLOOD PRESSURE < 80 MM HG: ICD-10-PCS | Mod: CPTII,S$GLB,, | Performed by: INTERNAL MEDICINE

## 2023-01-05 PROCEDURE — 3074F SYST BP LT 130 MM HG: CPT | Mod: CPTII,S$GLB,, | Performed by: INTERNAL MEDICINE

## 2023-01-05 PROCEDURE — 73130 X-RAY EXAM OF HAND: CPT | Mod: TC,LT

## 2023-01-05 PROCEDURE — 3044F PR MOST RECENT HEMOGLOBIN A1C LEVEL <7.0%: ICD-10-PCS | Mod: CPTII,S$GLB,, | Performed by: INTERNAL MEDICINE

## 2023-01-05 PROCEDURE — 73130 XR HAND COMPLETE 3 VIEW LEFT: ICD-10-PCS | Mod: 26,LT,, | Performed by: STUDENT IN AN ORGANIZED HEALTH CARE EDUCATION/TRAINING PROGRAM

## 2023-01-05 PROCEDURE — 3008F BODY MASS INDEX DOCD: CPT | Mod: CPTII,S$GLB,, | Performed by: INTERNAL MEDICINE

## 2023-01-05 PROCEDURE — 83036 HEMOGLOBIN GLYCOSYLATED A1C: CPT | Performed by: INTERNAL MEDICINE

## 2023-01-05 PROCEDURE — 99213 PR OFFICE/OUTPT VISIT, EST, LEVL III, 20-29 MIN: ICD-10-PCS | Mod: S$GLB,,, | Performed by: STUDENT IN AN ORGANIZED HEALTH CARE EDUCATION/TRAINING PROGRAM

## 2023-01-05 PROCEDURE — 99999 PR PBB SHADOW E&M-EST. PATIENT-LVL IV: ICD-10-PCS | Mod: PBBFAC,,, | Performed by: INTERNAL MEDICINE

## 2023-01-05 PROCEDURE — 3044F HG A1C LEVEL LT 7.0%: CPT | Mod: CPTII,S$GLB,, | Performed by: INTERNAL MEDICINE

## 2023-01-05 PROCEDURE — 1159F PR MEDICATION LIST DOCUMENTED IN MEDICAL RECORD: ICD-10-PCS | Mod: CPTII,S$GLB,, | Performed by: INTERNAL MEDICINE

## 2023-01-05 PROCEDURE — 83735 ASSAY OF MAGNESIUM: CPT | Performed by: INTERNAL MEDICINE

## 2023-01-05 PROCEDURE — 99999 PR PBB SHADOW E&M-EST. PATIENT-LVL III: ICD-10-PCS | Mod: PBBFAC,,, | Performed by: STUDENT IN AN ORGANIZED HEALTH CARE EDUCATION/TRAINING PROGRAM

## 2023-01-05 PROCEDURE — 3078F DIAST BP <80 MM HG: CPT | Mod: CPTII,S$GLB,, | Performed by: INTERNAL MEDICINE

## 2023-01-05 PROCEDURE — 80053 COMPREHEN METABOLIC PANEL: CPT | Performed by: INTERNAL MEDICINE

## 2023-01-05 PROCEDURE — 80061 LIPID PANEL: CPT | Performed by: INTERNAL MEDICINE

## 2023-01-05 NOTE — PROGRESS NOTES
Patient ID: Kayla Lowery  YOB: 1970  MRN: [id number]    Chief Complaint: Follow-up (L 4th metacarpal fx, 11/9/22)    Referred By: [facility]     Occupation: Physician    History of Present Illness: Kayla Lowery is a left-hand dominant 52 y.o. female who presents today with 2/10 left hand pain at her follow up for a left 4th metacarpal fracture.    She was initially evaluated on 11/17/22. She injured her left 4th finger on 11/9/22 when she was loading luggage in an overhead bin on an airplane.  A suitcase fell and she tried to catch it, causing her finger to bend backwards.  She had significant pain and treated conservatively since she was still able to move it, hoping the pain would resolve with time.  She used voltaren cream.  She sought care with her PCP on 11/17/22, where XR demonstrated a 4th metacarpal shaft fracture.  Referred for ortho consult same day.  Was placed in a plaster cast for 4 weeks, followed by transition to EXOS ulnar gutter splint for 3 weeks.    She is having some soreness at the base of her thumb today from the splint rubbing.  She also has pain with gripping and making a fist but has been avoiding strenuous use of the hand.    Past Medical History:   Past Medical History:   Diagnosis Date    ADHD (attention deficit hyperactivity disorder)     Anxiety     History of abnormal cervical Pap smear 2012    Hypertension     Migraines     Narcolepsy     Rosacea      History reviewed. No pertinent surgical history.  Family History   Problem Relation Age of Onset    Hypertension Mother     Heart disease Mother     Parkinsonism Mother     Coronary artery disease Mother     Diabetes Mother     Hyperlipidemia Mother     No Known Problems Father     Bladder Cancer Maternal Grandfather     Hypertension Paternal Grandfather     Heart disease Paternal Grandfather     Prostate cancer Paternal Grandfather     Macular degeneration Maternal Aunt      Social History      Socioeconomic History    Marital status:    Tobacco Use    Smoking status: Never    Smokeless tobacco: Never   Substance and Sexual Activity    Alcohol use: Yes     Comment: occasionally    Drug use: Never    Sexual activity: Yes     Partners: Male     Medication List with Changes/Refills   Current Medications    CETIRIZINE (ZYRTEC) 10 MG TABLET    Take 10 mg by mouth once daily.    DOXYCYCLINE 50 MG CAPSULE    Take 1 capsule (50 mg total) by mouth once daily. With food. Avoid lying down for 1 hour after taking. Avoid the sun.    FLUOXETINE 20 MG TABLET    Take 1 tablet (20 mg total) by mouth once daily.    METHYLPHENIDATE HCL (RITALIN) 10 MG TABLET    Take 1 tablet (10 mg total) by mouth 2 (two) times daily.    METHYLPHENIDATE HCL 18 MG CR TABLET    Take 1 tablet (18 mg total) by mouth every morning.    METRONIDAZOLE 0.75% (METROCREAM) 0.75 % CREA    Apply topically 2 (two) times daily.    ONDANSETRON (ZOFRAN) 8 MG TABLET    Take 1 tablet (8 mg total) by mouth every 8 (eight) hours as needed.    OXYMETAZOLINE (RHOFADE) 1 % CREA    Apply daily to affected area    PROPRANOLOL (INDERAL) 10 MG TABLET    Take 1 tablet (10 mg total) by mouth 2 (two) times daily as needed.    RIZATRIPTAN (MAXALT-MLT) 10 MG DISINTEGRATING TABLET    Take 1 tablet (10 mg total) by mouth as needed for Migraine.    TACROLIMUS (PROTOPIC) 0.1 % OINTMENT    Apply topically 2 (two) times daily.    VALACYCLOVIR (VALTREX) 1000 MG TABLET    Take 2 tablets by mouth twice daily for 2 days as needed first signs of outbreak    XYWAV 0.5 GRAM/ML SOLN    Take 3.75 g by mouth 2 (two) times a day. 1st dose: @ 8:30 pm, 3 g @ 12 MN     Review of patient's allergies indicates:   Allergen Reactions    Gold sodium thiosulfate Dermatitis, Edema, Itching, Rash and Swelling    Imidazolidinyl urea Dermatitis, Itching, Rash and Swelling    Codeine phosphate     Minocycline Other (See Comments)     dizziness    Tobramycin-lotepred        Physical Exam:    There is no height or weight on file to calculate BMI.    Physical Exam  Constitutional:       Appearance: Normal appearance.   Eyes:      Extraocular Movements: Extraocular movements intact.   Skin:     General: Skin is warm and dry.   Neurological:      General: No focal deficit present.   Psychiatric:         Mood and Affect: Mood normal.     Detailed MSK exam:   General    Abdominal: Normal appearance.   Psychiatric: Mood normal.         Left Hand/Wrist Exam     Inspection   Effusion:  Hand -  absent    Range of Motion     Wrist   Extension:  normal   Flexion:  normal   Pronation:  normal   Supination:  normal     Comments:  Mild TTP at the 4th MC shaft          Muscle Strength   Left Upper Extremity  Wrist extension: 5/5   Wrist flexion: 5/5   :  5/5 (5-/5)        Imaging:  X-Ray Hand 3 view Left  Narrative: EXAM: XR HAND COMPLETE 3 VIEW LEFT    CLINICAL HISTORY: left 4th metacarpal fracture;    TECHNIQUE: Frontal, lateral, and oblique views of the left hand.    COMPARISON: Multiple prior x-rays, most recent dated 12/13/2022    FINDINGS:  Healing fourth metacarpal fracture demonstrates no adverse change in alignment.  No new acute fracture or dislocation seen. Bone mineralization is normal. No aggressive lytic or blastic lesion. No osseous erosion or aggressive periosteal reaction seen. Joint spaces are preserved with normal alignment. Soft tissues are unremarkable.  Impression: Healing fourth metacarpal fracture with no adverse change in alignment.    Finalized on: 1/5/2023 9:20 AM By:  Venkat Brumfield MD  [facility]# [id number]      2023-01-05 09:22:[id number]    [facility]      Relevant imaging results were reviewed and interpreted by me and per my read: interval healing of her oblique 4th metacarpal fracture, in stable alignment.      This was discussed with the patient and / or family today.       Patient Instructions   Assessment:  Kayla Lowery is a 52 y.o. female with a chief complaint of Follow-up (L 4th  metacarpal fx, 11/9/22)      Encounter Diagnosis   Name Primary?    Displaced fracture of base of fourth metacarpal bone, left hand, subsequent encounter for fracture with routine healing Yes      Plan:  XR reviewed today, demonstrates good/adequate interval healing of 4th MC fx  She is still having some pain at this time, but the pain is not so much bony or related to the fracture, it is more dorsal and related to her extensor tendon complex.  We will continue splint for 2 weeks, with increased weight-bearing and activity during this time, then discontinue the splint after that if she is feeling okay  Order for hand therapy at [facility] - 2-3x per week for 6-8 weeks     Follow-up: 4 weeks, no XR needed or sooner if there are any problems between now and then.    Thank you for choosing [facility] and Dr. Asad Dean for your orthopedic & sports medicine care. It is our goal to provide you with exceptional care that will help keep you healthy, active, and get you back in the game.    Please do not hesitate to reach out to us via email, phone, or MyChart with any questions, concerns, or feedback.    If you are experiencing pain/discomfort ,or have questions after 5pm and would like to be connected to the [facility] on-call team, please call this number and specify which Sports Medicine provider is treating you: [phone]        Asad Dean MD  Primary Care Sports Medicine      Disclaimer: This note was prepared using a voice recognition system and is likely to have sound alike errors within the text.     I, Craig Murrieta, acted as a scribe for Asad Dean MD for the duration of this office visit.

## 2023-01-05 NOTE — PROGRESS NOTES
"Subjective:      Patient ID: Kayla Lowery is a 52 y.o. female.    Chief Complaint: Executive Health    HPI    It was a pleasure to meet you for your executive health physical.     Your sleep doctor is Dr. Wilson.     Your a 52-year-old with a past medical history of attention deficit disorder, anxiety, hypertension, migraines, narcolepsy, and rosacea, fever blisters.      Your current medications include Valtrex, Maxalt, propranolol, Zofran, Ritalin, fluoxetine, doxycycline, Zyrtec, xywav, over counter nexium.      You never smoked.      You have experienced side effects with :  Codeine - nausea  Minocycline  - Vertigo  Doxycycline - Sun sensitivity.   Tobramycin ophthalmic - burning to eyes.         Your family history includes:  Father - unknown health history  Mother - Diabetes, hyperlipidemia, hypertension, parkinson, scoliosis  Siblings - sister with stroke, obesity, hypertension  Son - narcolepsy, dyspraxia, dyslexia      Preventative healthcare:  Flu vaccine - reported up to date  Tetanus vaccine - reported up to date  Check with your pharmacy regarding new shingles vaccine.   COVID vaccine - reported up to date  Colonoscopy - reported up to date  Mammogram - completed march 2022. Repeat due march of 2023.  Gyn exam - See Dr. Teague.            Review of Systems   Constitutional:  Negative for chills and fever.   HENT:  Negative for ear pain and sore throat.    Respiratory:  Negative for cough.    Cardiovascular:  Negative for chest pain.   Gastrointestinal:  Negative for abdominal pain and blood in stool.   Genitourinary:  Negative for dysuria and hematuria.   Neurological:  Negative for seizures and syncope.   Objective:   /71   Pulse 68   Temp 96.9 °F (36.1 °C)   Ht 5' 2" (1.575 m)   Wt 45.8 kg (101 lb)   BMI 18.47 kg/m²     Physical Exam  Constitutional:       General: She is not in acute distress.     Appearance: She is well-developed.   HENT:      Head: Normocephalic and atraumatic. "   Eyes:      Extraocular Movements: Extraocular movements intact.   Neck:      Thyroid: No thyromegaly.   Cardiovascular:      Rate and Rhythm: Normal rate and regular rhythm.   Pulmonary:      Breath sounds: Normal breath sounds. No wheezing or rales.   Abdominal:      General: Bowel sounds are normal.      Palpations: Abdomen is soft.      Tenderness: There is no abdominal tenderness.   Musculoskeletal:         General: No swelling.      Cervical back: Neck supple. No rigidity.   Lymphadenopathy:      Cervical: No cervical adenopathy.   Skin:     General: Skin is warm and dry.   Neurological:      Mental Status: She is alert and oriented to person, place, and time.   Psychiatric:         Behavior: Behavior normal.       Lab Results   Component Value Date    WBC 5.45 01/05/2023    HGB 13.6 01/05/2023    HGB 14.2 01/11/2022    HGB 13.7 02/06/2007    HCT 38.8 01/05/2023    MCV 88 01/05/2023    MCV 92 01/11/2022    MCV 91.9 02/06/2007     01/05/2023    CHOL 156 01/05/2023    TRIG 65 01/05/2023    HDL 57 01/05/2023    LDLCALC 86.0 01/05/2023    LDLCALC 91.8 01/11/2022    LDLCALC 69.2 02/06/2007    ALT 25 01/05/2023    AST 22 01/05/2023     01/05/2023    K 4.4 01/05/2023     01/05/2023    CO2 27 01/05/2023    BUN 13 01/05/2023    CREATININE 0.7 01/05/2023    CREATININE 0.8 01/11/2022    CREATININE 0.8 02/06/2007    EGFRNORACEVR >60 01/05/2023    TSH 0.960 01/05/2023    TSH 1.470 01/11/2022    TSH 1.3 02/06/2007    GLU 87 01/05/2023    HGBA1C 5.2 01/05/2023    HGBA1C 5.2 01/11/2022          The 10-year ASCVD risk score (Douglas JO, et al., 2019) is: 1.2%    Values used to calculate the score:      Age: 52 years      Sex: Female      Is Non- : No      Diabetic: No      Tobacco smoker: No      Systolic Blood Pressure: 104 mmHg      Is BP treated: Yes      HDL Cholesterol: 57 mg/dL      Total Cholesterol: 156 mg/dL     Assessment:     1. Routine general medical examination at a  health care facility    2. Primary hypertension    3. 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 Routine general medical examination at a health care facility  Heart healthy diet, regular exercise, and regular use of sunscreen.   HM reviewed  2. Primary hypertension  controlled  3. 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       Has lost 8 pounds since 5/2022. Feels due to stress. Less appetite due to stress. Tired when gets home so not cooking as much.     Patient Instructions          Future Appointments   Date Time Provider Department Center   2/1/2023  7:00 AM ALEXANDER Durant V [facility] [place]   2/2/2023  8:00 AM Asad Dean MD [facility] [place]   5/11/2023  8:40 AM Scott Rachel MD [facility]       Lab Frequency Next Occurrence   Ambulatory referral/consult to Dermatology Once 01/18/2022   Ambulatory referral/consult to Orthopedics Once 11/24/2022   Ambulatory referral/consult to Physical/Occupational Therapy Once 01/12/2023       Follow up in about 3 weeks (around 1/26/2023).

## 2023-01-05 NOTE — PATIENT INSTRUCTIONS
Assessment:  Kayla Lowery is a 52 y.o. female with a chief complaint of Follow-up (L 4th metacarpal fx, 11/9/22)      Encounter Diagnosis   Name Primary?    Displaced fracture of base of fourth metacarpal bone, left hand, subsequent encounter for fracture with routine healing Yes      Plan:  XR reviewed today, demonstrates good/adequate interval healing of 4th MC fx  She is still having some pain at this time, but the pain is not so much bony or related to the fracture, it is more dorsal and related to her extensor tendon complex.  We will continue splint for 2 weeks, with increased weight-bearing and activity during this time, then discontinue the splint after that if she is feeling okay  Order for hand therapy at [facility] - 2-3x per week for 6-8 weeks     Follow-up: 4 weeks, no XR needed or sooner if there are any problems between now and then.    Thank you for choosing [facility] and Dr. Asad Dean for your orthopedic & sports medicine care. It is our goal to provide you with exceptional care that will help keep you healthy, active, and get you back in the game.    Please do not hesitate to reach out to us via email, phone, or MyChart with any questions, concerns, or feedback.    If you are experiencing pain/discomfort ,or have questions after 5pm and would like to be connected to the [facility] on-call team, please call this number and specify which Sports Medicine provider is treating you: [phone]

## 2023-01-05 NOTE — LETTER
"January 29, 2023    Kayla Lowery  [address] [address]             [address]  [address]  [place] [address]  Phone: [phone]  Fax: [phone] Dear Ms. Lowery:    It was a pleasure to see you for your executive health physical on 1/5/23     Your a 52-year-old with a past medical history of attention deficit disorder, anxiety, hypertension, migraines, narcolepsy, and rosacea, fever blisters.      Your current medications include Valtrex, Maxalt, propranolol, Zofran, Ritalin, fluoxetine, doxycycline, Zyrtec, xywav, over counter nexium.      You never smoked.      You have experienced side effects with :  Codeine - nausea  Minocycline  - Vertigo  Doxycycline - Sun sensitivity.   Tobramycin ophthalmic - burning to eyes.      Your family history includes:  Father - unknown health history  Mother - Diabetes, hyperlipidemia, hypertension, parkinson, scoliosis  Siblings - sister with stroke, obesity, hypertension  Son - narcolepsy, dyspraxia, dyslexia      Preventative healthcare:  Flu vaccine - reported up to date  Tetanus vaccine - reported up to date  Check with your pharmacy regarding new shingles vaccine.   COVID vaccine - reported up to date  Colonoscopy - reported up to date  Mammogram - completed march 2022. Repeat due march of 2023.  Gyn exam - See Dr. Teague.      Vital Signs:  /71   Pulse 68   Temp 96.9 °F (36.1 °C)   Ht 5' 2" (1.575 m)   Wt 45.8 kg (101 lb)   BMI 18.47 kg/m²      Your physical exam was normal.     Your lab results were all within acceptable ranges.     Again I would like to thank you for allowing me to participate in your executive health physical.  At this time it appears that you are in good overall health.  It is recommended that you maintain a heart healthy diet, regular exercise, and regular use of sunscreen along with regular checkups.      Please do not hesitate to contact me if you have any questions or " concerns or if I can be of further assistance.     Sincerely,      Cali Patel MD

## 2023-01-12 ENCOUNTER — CLINICAL SUPPORT (OUTPATIENT)
Dept: REHABILITATION | Facility: HOSPITAL | Age: 53
End: 2023-01-12
Attending: STUDENT IN AN ORGANIZED HEALTH CARE EDUCATION/TRAINING PROGRAM
Payer: COMMERCIAL

## 2023-01-12 DIAGNOSIS — R29.898 DECREASED GRIP STRENGTH OF LEFT HAND: ICD-10-CM

## 2023-01-12 DIAGNOSIS — R52 PAIN: ICD-10-CM

## 2023-01-12 DIAGNOSIS — S62.315D: ICD-10-CM

## 2023-01-12 DIAGNOSIS — Z78.9 DECREASED ACTIVITIES OF DAILY LIVING (ADL): ICD-10-CM

## 2023-01-12 DIAGNOSIS — M25.642 DECREASED RANGE OF MOTION OF FINGER OF LEFT HAND: ICD-10-CM

## 2023-01-12 PROCEDURE — 97110 THERAPEUTIC EXERCISES: CPT

## 2023-01-12 PROCEDURE — 97166 OT EVAL MOD COMPLEX 45 MIN: CPT

## 2023-01-12 NOTE — PLAN OF CARE
[facility]   Occupational Therapy Initial Evaluation     Date: 1/12/2023  Name: Kayla Lowery  Clinic Number: [id number]    Therapy Diagnosis:   Encounter Diagnoses   Name Primary?    Displaced fracture of base of fourth metacarpal bone, left hand, subsequent encounter for fracture with routine healing     Pain     Decreased range of motion of finger of left hand     Decreased activities of daily living (ADL)     Decreased  strength of left hand      Physician: Asad Dean MD    Physician Orders: Occupational Therapy to Evaluate and Treat  Medical Diagnosis: Displaced fracture of base of fourth metacarpal bone, left hand, subsequent encounter for fracture with routine healing  Evaluation Date: 1/12/2023  Insurance Authorization Period Expiration: 01/05/2023 - 01/05/2024  Plan of Care Certification Period: 01/12/2023 - 03/12/2023  Progress Note Due: 02/12/2023     Visit # / Visits authorized: 1/1  FOTO: 0/3    Date of Return to MD:  02/02/2023    Precautions:  Standard    Time In: 8:00  Time Out: 8:45  Total Appointment Time (timed & untimed codes): 45 minutes    Subjective     Date of Onset: November 9th 2022    Mechanism of Injury/ History of Current Condition: Patient reports that she was putting her luggage in an overhead bin on a airplane and it went to fall. She attempted to catch it and bent her finger backwards. She treated it conservatively because she was still able to move it and had it evaluated upon return home on 11/17/2022. Patient was found to have a 4th metacarpal fracture. She was casted for 4 weeks and then placed in a EXOS ulnar gutter splint for 3 weeks. Patient has been instructed by PCP to ease out of splint over the next 2 weeks as tolerated. Patient is a physician at [employer] and in interested in interval therapy with home exercise program to complete at home. Follow up next week with occupational therapy for progression on home exercise program. She also  reports pain with palpation over the metacarpophalangeal joint of 4th digit and soreness at the base of her thumb from splint rubbing. Since she is weaning out of the splint she is not concerned with alteration of the splint this visit.    Involved Side: left  Dominant Side: Right    Imaging: X-Ray 01/05/2023: FINDINGS:  Healing fourth metacarpal fracture demonstrates no adverse change in alignment.  No new acute fracture or dislocation seen. Bone mineralization is normal. No aggressive lytic or blastic lesion. No osseous erosion or aggressive periosteal reaction seen. Joint spaces are preserved with normal alignment. Soft tissues are unremarkable.    Previous Therapy: N/A    Patient's Goals for Therapy: Patient reported goals are to decrease overall pain levels in order to return to prior functional level.    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   Current Level of Function: Independent with all ADL, IADL, community mobility and functional activities with reports of increased pain and need for increased time and frequent breaks.    Limitation of Functional Status as follows:   ADLs/IADLs:     - Feeding: independence with pain    - Bathing: independence with pain    - Dressing independence with pain  - Grooming: independence with pain    - Driving: independence with pain  - Leisure: Relax, yoga    Home/Living Environment : lives with their family  Home Access: two story   DME: ulnar gutter brace      Past Medical History/Physical Systems Review:   Medical History:   Past Medical History:   Diagnosis Date    ADHD  (attention deficit hyperactivity disorder)     Anxiety     History of abnormal cervical Pap smear 2012    Hypertension     Migraines     Narcolepsy     Rosacea        Surgical History:    has no past surgical history on file.    Medications:   has a current medication list which includes the following prescription(s): cetirizine, doxycycline, fluoxetine, methylphenidate hcl, methylphenidate hcl, metronidazole 0.75%, ondansetron, oxymetazoline, propranolol, rizatriptan, tacrolimus, valacyclovir, and xywav.    Allergies:   Review of patient's allergies indicates:   Allergen Reactions    Gold sodium thiosulfate Dermatitis, Edema, Itching, Rash and Swelling    Imidazolidinyl urea Dermatitis, Itching, Rash and Swelling    Codeine phosphate     Minocycline Other (See Comments)     dizziness    Tobramycin-lotepred           Objective     Observation/Inspection:  Pain with palpation over the metacarpophalangeal joint of 4th digit and soreness at the base of her thumb. Minor edema over 4th metacarpal.     Active Range of Motion:    Joint Evaluation  AROM   AROM   Pain/Dysfunction with movement Goal    Left Right     Elbow Flexion 0-150 Within normal limits  Within normal limits      Elbow Extension 0 Within normal limits       Wrist Flexion 0-80 50   Within normal limits    Wrist Extension 0-70 55  Yes increase with weight bearing Within normal limits    Wrist Supination 0-80 Within normal limits       Wrist Pronation 0-80 Within normal limits       Wrist Ulnar Deviation 0-30 15      Wrist Radial Deviation 0-20 15          Range of Motion: left Active  (Ext/Flex) Index Middle Ring Small          MCP Joint  (45/90) Within normal limits  Within normal limits  10/80° Within normal limits    PIP Joint  (0/100)   0/90°    DIP Joint  (0/90)   0/90°       and Pinch Strength (in pounds, psi's):   Left Right Goal Left    1/12/2023 1/12/2023     II 32 55 45   Lateral 7 9    Tripod 7 11    Tip 5 7        CMS  Impairment/Limitation/Restriction for FOTO Survey (Time constraint) to be performed on next visit if indicated     Therapist reviewed FOTO scores for Kayla Lowery on 1/12/2023.   FOTO documents entered into [employer] - see Media section.    Limitation Score: %          Treatment     Total Treatment time (time-based codes) separate from Evaluation: 8 minutes      Kayla received therapeutic exercises for 8 minutes including:  - Finger taps (3rd, 4th, 5th digit extension)  - Open close fist with full finger abduction  - Tendon glides  - Finger abduction/adduction (non resistive)  -Wrist flexion elbow extended with passive stretch (pain free)  -Wrist extension elbow extended with passive stretch (pain free)      Home Exercise Program/Education:    Education provided:   Patient educated on the impairments noted above and the effects of Occupational therapy intervention to improve overall condition and Quality of Life.   Patient was educated on all the above exercise prior/during/after for proper posture, positioning, and execution for safe performance with home exercise program.  Patient/Family Education: role of Occupational Therapy, goals for Occupational Therapy, scheduling/cancellations - patient verbalized understanding  Home Exercise Program - See Below    Written Home Exercises Provided: yes.  Exercises were reviewed and Kayla was able to demonstrate them prior to the end of the session. Patient was advised to perform these exercises free of pain, and to stop performing them if pain occurs.  Kayla demonstrated good  understanding of the education provided.     See EMR under Patient Instructions for exercises provided 1/12/2023.    Assessment     Kayla Lowery is a 52 y.o. female referred to outpatient occupational therapy and presents with a medical diagnosis of Displaced fracture of base of fourth metacarpal bone, left hand, subsequent encounter for fracture with routine healing.  Patient presents with the  following therapy deficits: Decreased ROM, Decreased  strength, Decreased pinch strength, Decreased muscle strength, Decreased functional hand use, and Increased pain and demonstrates limitations as described in the chart below. Following medical record review, it is determined that the patient will benefit from occupational therapy services in order to maximize pain free and/or functional use of left hand/wrist. The following goals were discussed with the patient and patient's spiritual, cultural and educational needs considered. Patient is in agreement with plan of care and goals as to be addressed in the treatment plan. The patient's rehabilitation potential is Excellent.     Anticipated barriers to occupational therapy: Pain    Patient has no cultural, educational or language barriers to learning provided.    Medical Necessity is demonstrated by the following  Profile and History Assessment of Occupational Performance Level of Clinical Decision Making Complexity Score   Occupational Profile:   Kayla Lowery is a 52 y.o. female who lives with their son and is currently Employed. Kayla Lowery has difficulty with  feeding and  cleaning/home management and meal preparation  affecting her daily functional abilities. her  main goal for therapy is improve range of motion, strength and reduce pain (return to yoga).      Comorbidities:    has a past medical history of ADHD (attention deficit hyperactivity disorder), Anxiety, History of abnormal cervical Pap smear, Hypertension, Migraines, Narcolepsy, and Rosacea.    Medical and Therapy History Review:   Expanded               Performance Deficits    Physical:  Joint Mobility  Muscle Power/Strength   Strength  Pinch Strength  Fine Motor Coordination  Pain    Cognitive:  No Deficits    Psychosocial:    No Deficits     Clinical Decision Making:  moderate    Assessment Process:  Problem-Focused Assessments    Modification/Need for Assistance:  Not  Necessary    Intervention Selection:  Several Treatment Options       moderate  Based on past medical history, co-morbidities, data from assessments and functional level of assistance required with task and clinical presentation directly impacting function.       The following goals were discussed with the patient and patient is in agreement with them as to be addressed in the treatment plan.     Goals:    Short Term Goals:  4 weeks  Progress   1/12/2023   Pain: Patient will demonstrate improved pain by reports of less than or equal to 5/10 worst pain on the verbal rating scale in order to progress toward maximal functional ability and improve quality of life. PC   Function: Patient will demonstrate improved function as indicated by a functional limitation score of less than or equal to out of 100 on FOTO. (TBD) PC   Mobility: Patient will improve AROM to 50% of stated goals, listed in objective measures above, in order to progress towards independence with functional activities.  PC   Strength: Patient will improve strength to 50% of stated goals, listed in objective measures above, in order to progress towards independence with functional activities.  PC   HEP: Patient will demonstrate independence with HEP in order to progress toward functional independence. PC     Long Term Goals:  8 weeks  Progress  1/12/2023   Pain: Patient will demonstrate improved pain by reports of less than or equal to 3/10 worst pain on the verbal rating scale in order to progress toward maximal functional ability and improve quality of life.   PC   Function: Patient will demonstrate improved function as indicated by a functional limitation score of less than or equal to ? out of 100 on FOTO. (TBD) PC   Mobility: Patient will improve AROM to stated goals, listed in objective measures above, in order to return to maximal functional potential and improve quality of life. PC   Strength: Patient will improve strength to stated goals, listed  in objective measures above, in order to improve functional independence and quality of life. PC   Patient will return to normal ADL's, IADL's, community involvement, recreational activities, and work-related activities with less than or equal to 1/10 pain and maximal function.  PC     Goals Key:  PC= Progressing/Continue; PM= Partially Met;  Goal Met= Goal Met      DC= Discontinue    Plan   Plan of Care Certification: 1/12/2023 to 03/12/2023.     Outpatient Occupational Therapy 1-2 times weekly for 8 weeks to include the following interventions:  Therapeutic Exercise, Functional Activities, Patient Education, Home Exercise Program, ADL Training, Manual Therapy, Moist Heat/Ice/Paraffin, Orthotic Fabrication/Fit/Management, and Edema Control/Fluidotherapy.    Plan Next Visit: Progress patient Plan of Care as tolerated per patient    Keya Hassan OT ,OTD, OTR/L      I CERTIFY THE NEED FOR THESE SERVICES FURNISHED UNDER THIS PLAN OF TREATMENT AND WHILE UNDER MY CARE  Physician's comments:      Physician's Signature: ___________________________________________________

## 2023-01-13 ENCOUNTER — PATIENT MESSAGE (OUTPATIENT)
Dept: SLEEP MEDICINE | Facility: CLINIC | Age: 53
End: 2023-01-13
Payer: COMMERCIAL

## 2023-01-13 DIAGNOSIS — G47.419 PRIMARY NARCOLEPSY WITHOUT CATAPLEXY: ICD-10-CM

## 2023-01-13 RX ORDER — METHYLPHENIDATE HYDROCHLORIDE 10 MG/1
10 TABLET ORAL 2 TIMES DAILY
Qty: 120 TABLET | Refills: 0 | Status: SHIPPED | OUTPATIENT
Start: 2023-01-13 | End: 2023-03-07 | Stop reason: SDUPTHER

## 2023-01-18 ENCOUNTER — CLINICAL SUPPORT (OUTPATIENT)
Dept: REHABILITATION | Facility: HOSPITAL | Age: 53
End: 2023-01-18
Payer: COMMERCIAL

## 2023-01-18 DIAGNOSIS — M25.642 DECREASED RANGE OF MOTION OF FINGER OF LEFT HAND: ICD-10-CM

## 2023-01-18 DIAGNOSIS — R52 PAIN: Primary | ICD-10-CM

## 2023-01-18 DIAGNOSIS — R29.898 DECREASED GRIP STRENGTH OF LEFT HAND: ICD-10-CM

## 2023-01-18 DIAGNOSIS — Z78.9 DECREASED ACTIVITIES OF DAILY LIVING (ADL): ICD-10-CM

## 2023-01-18 PROCEDURE — 97110 THERAPEUTIC EXERCISES: CPT | Performed by: OCCUPATIONAL THERAPIST

## 2023-01-18 PROCEDURE — 97530 THERAPEUTIC ACTIVITIES: CPT | Performed by: OCCUPATIONAL THERAPIST

## 2023-01-18 NOTE — PROGRESS NOTES
OCCUPATIONAL THERAPY DAILY NOTE    Name: Kayla Lenzclair  Clinic Number: [id number]    Therapy Diagnosis:   Encounter Diagnoses   Name Primary?    Pain Yes    Decreased range of motion of finger of left hand     Decreased activities of daily living (ADL)     Decreased  strength of left hand      Physician: Asad Dean MD    Visit Date: 1/18/2023    Physician: Asad Dean MD     Physician Orders: Occupational Therapy to Evaluate and Treat  Medical Diagnosis: Displaced fracture of base of fourth metacarpal bone, left hand, subsequent encounter for fracture with routine healing  Evaluation Date: 1/12/2023  Insurance Authorization Period Expiration: 01/05/2023 - 01/05/2024  Plan of Care Certification Period: 01/12/2023 - 03/12/2023  Progress Note Due: 02/12/2023      Visit # / Visits authorized: 1/1  FOTO: 0/3     Date of Return to MD:  02/02/2023     Precautions:  Standard     Time In: 700  Time Out: 0745  Total Appointment Time (timed & untimed codes): 45 minutes  Total billable time: 40 minutes     SUBJECTIVE     Today, pt reports: decreased stiffness of her hand and pain with palpation over the dorsum of the 4th metacarpal phalangeal joint . She has wrist stiffness.  He/She was compliant with home exercise program.  Response to previous treatment: increased pain free range of motion  Functional change: light     Pre-Treatment Pain: 0/10  Post-Treatment Pain: 0/10  Location: metacarpal phalangeal joint with palpation  TREATMENT     Kayla received therapeutic exercises to develop strength, ROM, and flexibility for 32 minutes including:    Exercise 1/18/2023   Tendon glides 10-20x   Abduction/adduction with rubber band 10-20x   Ring finger abduction/adduction with rubberband 5-10x   Digit extension  10x   Digit adduction with red foam 10/5 second holds   Lumbrical muscle with red foam 10-20x   Pinch: red foam  Lateral  1 point  2 point 20x each   Wrist stretches with elbow  extension:  Flexion  Extension  Ulnar/radial deviation 3/30 second holds       Kayla received the following manual therapy techniques: Soft tissue Mobilization and IASTM were applied to the: hand for 3 minutes, including:  STM of left  hand        Kayla participated in dynamic functional therapeutic activities to improve functional performance for 3  minutes, including:    Exercise 1/18/2023   dexicisor 3 minutes                                    Kayla received the following supervised modalities after being cleared for contradictions:     Kayla received hot pack for 5 minutes to left hand to improve tissue extensibility and decrease pain.    Kayla received cold pack for 0 minutes.    Home Exercises Provided and Patient Education Provided     Education/Self-Care provided: (2 minutes)  Patient educated on biomechanical justification for therapeutic exercise and importance of compliance with HEP in order to improve overall impairments and QOL   Gentle strengthening without pain.    Written Home Exercises Provided: yes.  Exercises were reviewed and Kayla was able to demonstrate them prior to the end of the session.  Kayla demonstrated good  understanding of the education provided.     See EMR under Patient Instructions for exercises provided 1/18/2023.    ASSESSMENT   Pt tolerated manual therapy well . Pt tolerated exercise well with reports of increased fatigue but no increased pain. Pt demonstrated good understanding of exercises and required minimal cueing to maintain proper form.  Patient demonstrated increased range of motion and minimal end range stiffness and discomfort with wrist extension/flexion and ulnar/radial deviation.    Kayla Is progressing well towards her goals.   Pt prognosis is Good.     Pt will continue to benefit from skilled outpatient occupational therapy to address the deficits listed in the problem list box on initial evaluation, provide pt/family education and to maximize pt's level of  independence in the home and community environment.     Pt's spiritual, cultural and educational needs considered and pt agreeable to plan of care and goals.     Anticipated barriers to occupational therapy: weight bearing    Goals:     Short Term Goals:  4 weeks  Progress   1/13/2023   Pain: Patient will demonstrate improved pain by reports of less than or equal to 5/10 worst pain on the verbal rating scale in order to progress toward maximal functional ability and improve quality of life. PC   Function: Patient will demonstrate improved function as indicated by a functional limitation score of less than or equal to out of 100 on FOTO. (TBD) PC   Mobility: Patient will improve AROM to 50% of stated goals, listed in objective measures above, in order to progress towards independence with functional activities.  PM   Strength: Patient will improve strength to 50% of stated goals, listed in objective measures above, in order to progress towards independence with functional activities.  PM   HEP: Patient will demonstrate independence with HEP in order to progress toward functional independence. PM      Long Term Goals:  8 weeks  Progress  1/13/2023   Pain: Patient will demonstrate improved pain by reports of less than or equal to 3/10 worst pain on the verbal rating scale in order to progress toward maximal functional ability and improve quality of life.   PC   Function: Patient will demonstrate improved function as indicated by a functional limitation score of less than or equal to ? out of 100 on FOTO. (TBD) PC   Mobility: Patient will improve AROM to stated goals, listed in objective measures above, in order to return to maximal functional potential and improve quality of life. PC   Strength: Patient will improve strength to stated goals, listed in objective measures above, in order to improve functional independence and quality of life. PC   Patient will return to normal ADL's, IADL's, community involvement,  recreational activities, and work-related activities with less than or equal to 1/10 pain and maximal function.  PC      Goals Key:  PC= Progressing/Continue;       PM= Partially Met;       Goal Met= Goal Met      DC= Discontinue       PLAN   Continue Plan of Care (POC) and progress per patient tolerance.    Fatemeh Waller, OTR, LOWELL

## 2023-01-23 ENCOUNTER — PATIENT MESSAGE (OUTPATIENT)
Dept: SLEEP MEDICINE | Facility: CLINIC | Age: 53
End: 2023-01-23
Payer: COMMERCIAL

## 2023-01-29 DIAGNOSIS — G47.419 PRIMARY NARCOLEPSY WITHOUT CATAPLEXY: ICD-10-CM

## 2023-01-30 RX ORDER — FLUOXETINE 20 MG/1
20 TABLET ORAL DAILY
Qty: 90 TABLET | Refills: 3 | Status: SHIPPED | OUTPATIENT
Start: 2023-01-30 | End: 2024-01-18

## 2023-01-30 RX ORDER — METHYLPHENIDATE HYDROCHLORIDE 18 MG/1
18 TABLET ORAL EVERY MORNING
Qty: 90 TABLET | Refills: 0 | Status: SHIPPED | OUTPATIENT
Start: 2023-01-30 | End: 2023-02-20 | Stop reason: SDUPTHER

## 2023-01-30 NOTE — TELEPHONE ENCOUNTER
No new care gaps identified.  [facility] Embedded Care Gaps. Reference number: [id number]. 1/29/2023   8:57:24 PM CST

## 2023-01-30 NOTE — TELEPHONE ENCOUNTER
Refill Decision Note   Kayla Lowery  is requesting a refill authorization.  Brief Assessment and Rationale for Refill:  Approve     Medication Therapy Plan:       Medication Reconciliation Completed: No   Comments:     No Care Gaps recommended.     Note composed:1:25 AM 01/30/2023

## 2023-02-01 ENCOUNTER — CLINICAL SUPPORT (OUTPATIENT)
Dept: REHABILITATION | Facility: HOSPITAL | Age: 53
End: 2023-02-01
Payer: COMMERCIAL

## 2023-02-01 DIAGNOSIS — M25.642 DECREASED RANGE OF MOTION OF FINGER OF LEFT HAND: ICD-10-CM

## 2023-02-01 DIAGNOSIS — Z78.9 DECREASED ACTIVITIES OF DAILY LIVING (ADL): ICD-10-CM

## 2023-02-01 DIAGNOSIS — R52 PAIN: Primary | ICD-10-CM

## 2023-02-01 DIAGNOSIS — R29.898 DECREASED GRIP STRENGTH OF LEFT HAND: ICD-10-CM

## 2023-02-01 PROCEDURE — 97110 THERAPEUTIC EXERCISES: CPT | Performed by: OCCUPATIONAL THERAPIST

## 2023-02-01 NOTE — PROGRESS NOTES
OCCUPATIONAL THERAPY DISCHARGE NOTE    Name: Kayla Lowery  Clinic Number: [id number]    Therapy Diagnosis:   Encounter Diagnoses   Name Primary?    Pain Yes    Decreased range of motion of finger of left hand     Decreased activities of daily living (ADL)     Decreased  strength of left hand      Physician: Asad Dean MD    Visit Date: 2/1/2023    Physician: Asad Dean MD     Physician Orders: Occupational Therapy to Evaluate and Treat  Medical Diagnosis: Displaced fracture of base of fourth metacarpal bone, left hand, subsequent encounter for fracture with routine healing  Evaluation Date: 1/12/2023  Insurance Authorization Period Expiration: 01/05/2023 - 01/05/2024  Plan of Care Certification Period: 01/12/2023 - 03/12/2023  Progress Note Due: 02/12/2023      Visit # / Visits authorized: 2 /20  episode: 3  FOTO: 0/3     Date of Return to MD:  02/02/2023     Precautions:  Standard     Time In: 0655  Time Out: 0755  Total Appointment Time (timed & untimed codes): 60 minutes  Total billable time: 55 minutes     SUBJECTIVE     Today, pt reports: that she is using her hand more. Her worst pain is a 4/10 and she has 2/10 pain when she wakes up in the morning. She is requesting discharge and will work on strengthening exercises at home.  He/She was compliant with home exercise program.  Response to previous treatment:full range of motion without pain  Functional change: light     Pre-Treatment Pain: 0/10  Post-Treatment Pain: 0/10  Location: metacarpal phalangeal joint with palpation    OBJECTIVE:  Active Range of Motion:    Joint Evaluation  AROM    AROM    Pain/Dysfunction with movement Goal      Left Right        Elbow Flexion 0-150 Within normal limits  Within normal limits         Elbow Extension 0 Within normal limits          Wrist Flexion 0-80 50    Within normal limits  GM   Wrist Extension 0-70 55  Yes increase with weight bearing   Within normal limits  GM   Wrist Supination 0-80  Within normal limits          Wrist Pronation 0-80 Within normal limits          Wrist Ulnar Deviation 0-30 15         Wrist Radial Deviation 0-20 15             Range of Motion: left Active  (Ext/Flex) Index Middle Ring Small                 MCP Joint  (45/90) Within normal limits  Within normal limits  10/80° Within normal limits  GM   PIP Joint  (0/100)   0/90°     DIP Joint  (0/90)   0/90°         and Pinch Strength (in pounds, psi's):    Left Right Goal Left      1/12/2023 1/12/2023       II 32 55 38# PM   Lateral 7 9      Tripod 7 11      Tip 5 7         WRIST STRENGTH:  Flexion/extension: 3+/5  Ulnar and radial deviation: 3+/5  Forearm supination/pronation: 3+/5    TREATMENT     Kayla received therapeutic exercises to develop strength, ROM, and flexibility for 57 minutes including:    Exercise 2/1/2023   Tendon glides 10-20x   Abduction/adduction with rubber band 10-20x   Ring finger abduction/adduction with rubberband 5-10x   Digit extension  10x   Digit adduction with red foam 10/5 second holds   Lumbrical muscle with red foam 10-20x   Pinch: blue foam  Lateral  1 point  2 point 20x each   Wrist stretches with elbow extension:  Flexion  Extension  Ulnar/radial deviation 3/30 second holds   Wrist isotonics: 1#  Wrist flexion  Extension  Ulnar/radial deviation  Forearm rotation 20x each   Reverse curls: 2#  Tricep curls: 2# 20x each       Kayla received the following manual therapy techniques: Soft tissue Mobilization and IASTM were applied to the: hand for 3 minutes, including:  STM of left  hand       Kayla received the following supervised modalities after being cleared for contradictions:     Kayla received hot pack for 0 minutes to left hand to improve tissue extensibility and decrease pain.    Kayla received cold pack for 0 minutes.    Home Exercises Provided and Patient Education Provided     Education/Self-Care provided: (2 minutes)  Patient educated on biomechanical justification for  therapeutic exercise and importance of compliance with HEP in order to improve overall impairments and QOL   Gentle strengthening without pain.    Written Home Exercises Provided: yes.  Exercises were reviewed and Kayla was able to demonstrate them prior to the end of the session.  Kayla demonstrated good  understanding of the education provided.     See EMR under Patient Instructions for exercises provided 1/18/2023.    ASSESSMENT   Pt tolerated manual therapy well . Pt tolerated exercise well with reports of increased fatigue but no increased pain. Pt demonstrated good understanding of exercises and required minimal cueing to maintain proper form.  Patient demonstrated normal active and passive range of motion and nol end range stiffness of her digits and mild discomfort with wrist extension/flexion and ulnar/radial deviation. Patient has increased  strength and wrist strength and is independent with her HOME EXERCISE PROGRAM.    Kayla Is progressing well towards her goals.   Pt prognosis is Good.     Pt will continue to benefit from skilled outpatient occupational therapy to address the deficits listed in the problem list box on initial evaluation, provide pt/family education and to maximize pt's level of independence in the home and community environment.     Pt's spiritual, cultural and educational needs considered and pt agreeable to plan of care and goals.     Anticipated barriers to occupational therapy: weight bearing    Goals:     Short Term Goals:  4 weeks  Progress   2/1/2023   Pain: Patient will demonstrate improved pain by reports of less than or equal to 5/10 worst pain on the verbal rating scale in order to progress toward maximal functional ability and improve quality of life. Met 2/1/2023   Function: Patient will demonstrate improved function as indicated by a functional limitation score of less than or equal to out of 100 on FOTO. (TBD) Not Tested   Mobility: Patient will improve AROM to 50%  of stated goals, listed in objective measures above, in order to progress towards independence with functional activities.  Met 2/1/2023   Strength: Patient will improve strength to 50% of stated goals, listed in objective measures above, in order to progress towards independence with functional activities.  Met 2/1/2023   HEP: Patient will demonstrate independence with HEP in order to progress toward functional independence. Met 2/1/2023      Long Term Goals:  8 weeks  Progress  2/1/2023   Pain: Patient will demonstrate improved pain by reports of less than or equal to 3/10 worst pain on the verbal rating scale in order to progress toward maximal functional ability and improve quality of life.   PM   Function: Patient will demonstrate improved function as indicated by a functional limitation score of less than or equal to ? out of 100 on FOTO. (TBD) Not tested    Mobility: Patient will improve AROM to stated goals, listed in objective measures above, in order to return to maximal functional potential and improve quality of life. 2/1/2023  Met   Strength: Patient will improve strength to stated goals, listed in objective measures above, in order to improve functional independence and quality of life. PM   Patient will return to normal ADL's, IADL's, community involvement, recreational activities, and work-related activities with less than or equal to 1/10 pain and maximal function.  PC      Goals Key:  PC= Progressing/Continue;       PM= Partially Met;       Goal Met= Goal Met      DC= Discontinue       PLAN   DISCHARGE OUTPATIENT OCCUPATIONAL THERAPY     ALEXANDER Durant LOTR

## 2023-02-02 ENCOUNTER — OFFICE VISIT (OUTPATIENT)
Dept: SPORTS MEDICINE | Facility: CLINIC | Age: 53
End: 2023-02-02
Payer: COMMERCIAL

## 2023-02-02 ENCOUNTER — TELEPHONE (OUTPATIENT)
Dept: PULMONOLOGY | Facility: CLINIC | Age: 53
End: 2023-02-02
Payer: COMMERCIAL

## 2023-02-02 DIAGNOSIS — S62.315D: Primary | ICD-10-CM

## 2023-02-02 PROCEDURE — 1159F PR MEDICATION LIST DOCUMENTED IN MEDICAL RECORD: ICD-10-PCS | Mod: CPTII,S$GLB,, | Performed by: STUDENT IN AN ORGANIZED HEALTH CARE EDUCATION/TRAINING PROGRAM

## 2023-02-02 PROCEDURE — 99999 PR PBB SHADOW E&M-EST. PATIENT-LVL III: ICD-10-PCS | Mod: PBBFAC,,, | Performed by: STUDENT IN AN ORGANIZED HEALTH CARE EDUCATION/TRAINING PROGRAM

## 2023-02-02 PROCEDURE — 99213 PR OFFICE/OUTPT VISIT, EST, LEVL III, 20-29 MIN: ICD-10-PCS | Mod: S$GLB,,, | Performed by: STUDENT IN AN ORGANIZED HEALTH CARE EDUCATION/TRAINING PROGRAM

## 2023-02-02 PROCEDURE — 1159F MED LIST DOCD IN RCRD: CPT | Mod: CPTII,S$GLB,, | Performed by: STUDENT IN AN ORGANIZED HEALTH CARE EDUCATION/TRAINING PROGRAM

## 2023-02-02 PROCEDURE — 3044F PR MOST RECENT HEMOGLOBIN A1C LEVEL <7.0%: ICD-10-PCS | Mod: CPTII,S$GLB,, | Performed by: STUDENT IN AN ORGANIZED HEALTH CARE EDUCATION/TRAINING PROGRAM

## 2023-02-02 PROCEDURE — 99999 PR PBB SHADOW E&M-EST. PATIENT-LVL III: CPT | Mod: PBBFAC,,, | Performed by: STUDENT IN AN ORGANIZED HEALTH CARE EDUCATION/TRAINING PROGRAM

## 2023-02-02 PROCEDURE — 99213 OFFICE O/P EST LOW 20 MIN: CPT | Mod: S$GLB,,, | Performed by: STUDENT IN AN ORGANIZED HEALTH CARE EDUCATION/TRAINING PROGRAM

## 2023-02-02 PROCEDURE — 3044F HG A1C LEVEL LT 7.0%: CPT | Mod: CPTII,S$GLB,, | Performed by: STUDENT IN AN ORGANIZED HEALTH CARE EDUCATION/TRAINING PROGRAM

## 2023-02-02 NOTE — PATIENT INSTRUCTIONS
Assessment:  Kayla Lowery is a 52 y.o. female with a chief complaint of Follow-up (L 4th metacarpal fx, 11/9/22)    Encounter Diagnosis   Name Primary?    Displaced fracture of base of fourth metacarpal bone, left hand, subsequent encounter for fracture with routine healing Yes      Plan:  Hand functionality, strength, and pain continues to improve, although still with some strength and ROM deficits, although this is not unexpected after a fracture, and should continue to progressively improve over time. There is a chance for permanent post-traumatic stiffness and/or arthritis  Continue home exercise program, as designed by Occupational therapy  Okay to continue to progressively increase her activity and loading on her hand, with aim of getting back to normal, pre-injury levels over the next couple of weeks  OTC analgesics as needed for pain    Follow-up:  As needed or sooner if there are any problems between now and then.    Thank you for choosing [facility] and Dr. Asad Dean for your orthopedic & sports medicine care. It is our goal to provide you with exceptional care that will help keep you healthy, active, and get you back in the game.    Please do not hesitate to reach out to us via email, phone, or MyChart with any questions, concerns, or feedback.    If you are experiencing pain/discomfort ,or have questions after 5pm and would like to be connected to the [facility] on-call team, please call this number and specify which Sports Medicine provider is treating you: [phone]

## 2023-02-02 NOTE — PROGRESS NOTES
Patient ID: Kayla Lowery  YOB: 1970  MRN: [id number]    Chief Complaint: Follow-up (L 4th metacarpal fx, 11/9/22)    Referred By: [facility]     Occupation: Physician    History of Present Illness: Kayla Lowery is a left-hand dominant 52 y.o. female who presents today with 2/10 left hand pain at her follow up for a left 4th metacarpal fracture.    She was initially evaluated on 11/17/22. She injured her left 4th finger on 11/9/22 when she was loading luggage in an overhead bin on an airplane.  A suitcase fell and she tried to catch it, causing her finger to bend backwards.  She had significant pain and treated conservatively since she was still able to move it, hoping the pain would resolve with time.  She used voltaren cream.  She sought care with her PCP on 11/17/22, where XR demonstrated a 4th metacarpal shaft fracture.  Referred for ortho consult same day.  Was placed in a plaster cast for 4 weeks, followed by transition to EXOS ulnar gutter splint for 5 weeks.  At her last visit on 1/5/23, she continued to have dorsal wrist pain related to her extensor tendon complex.  She was referred to [facility] for hand therapy and was given a home exercise program.  She has been out of the splint since mid-January 2023.  She continues to have stiffness especially in the AM.  She has been able to resume some of her regular activities like yoga.      Past Medical History:   Past Medical History:   Diagnosis Date    ADHD (attention deficit hyperactivity disorder)     Anxiety     History of abnormal cervical Pap smear 2012    Hypertension     Migraines     Narcolepsy     Rosacea      History reviewed. No pertinent surgical history.  Family History   Problem Relation Age of Onset    Hypertension Mother     Heart disease Mother     Parkinsonism Mother     Coronary artery disease Mother     Diabetes Mother     Hyperlipidemia Mother     Depression Mother     Hearing loss Mother     Alcohol abuse  Father         in remission    Drug abuse Father         in remission    Bladder Cancer Maternal Grandfather     Heart disease Maternal Grandfather     Hypertension Paternal Grandfather     Heart disease Paternal Grandfather     Prostate cancer Paternal Grandfather     Macular degeneration Maternal Aunt     Arthritis Sister     Depression Sister     Stroke Sister     Arthritis Sister     Depression Sister     Hypertension Sister     Asthma Son     Learning disabilities Son     Diabetes Maternal Aunt     Hyperlipidemia Maternal Aunt      Social History     Socioeconomic History    Marital status:    Tobacco Use    Smoking status: Never    Smokeless tobacco: Never   Substance and Sexual Activity    Alcohol use: Not Currently     Alcohol/week: 1.0 standard drink     Types: 1 Drinks containing 0.5 oz of alcohol per week     Comment: occasional alcohol less than once a week    Drug use: Never    Sexual activity: Yes     Partners: Male     Birth control/protection: Partner-Vasectomy, Post-menopausal     Medication List with Changes/Refills   Current Medications    CETIRIZINE (ZYRTEC) 10 MG TABLET    Take 10 mg by mouth once daily.    DOXYCYCLINE 50 MG CAPSULE    Take 1 capsule (50 mg total) by mouth once daily. With food. Avoid lying down for 1 hour after taking. Avoid the sun.    FLUOXETINE 20 MG TABLET    Take 1 tablet (20 mg total) by mouth once daily.    METHYLPHENIDATE HCL (RITALIN) 10 MG TABLET    Take 1 tablet (10 mg total) by mouth 2 (two) times daily.    METHYLPHENIDATE HCL 18 MG CR TABLET    Take 1 tablet (18 mg total) by mouth every morning.    METRONIDAZOLE 0.75% (METROCREAM) 0.75 % CREA    Apply topically 2 (two) times daily.    ONDANSETRON (ZOFRAN) 8 MG TABLET    Take 1 tablet (8 mg total) by mouth every 8 (eight) hours as needed.    OXYMETAZOLINE (RHOFADE) 1 % CREA    Apply daily to affected area    PROPRANOLOL (INDERAL) 10 MG TABLET    Take 1 tablet (10 mg total) by mouth 2 (two) times daily as  needed.    RIZATRIPTAN (MAXALT-MLT) 10 MG DISINTEGRATING TABLET    Take 1 tablet (10 mg total) by mouth as needed for Migraine.    TACROLIMUS (PROTOPIC) 0.1 % OINTMENT    Apply topically 2 (two) times daily.    VALACYCLOVIR (VALTREX) 1000 MG TABLET    Take 2 tablets by mouth twice daily for 2 days as needed first signs of outbreak    XYWAV 0.5 GRAM/ML SOLN    Take 3.75 g by mouth 2 (two) times a day. 1st dose: @ 8:30 pm, 3 g @ 12 MN     Review of patient's allergies indicates:   Allergen Reactions    Gold sodium thiosulfate Dermatitis, Edema, Itching, Rash and Swelling    Imidazolidinyl urea Dermatitis, Itching, Rash and Swelling    Codeine phosphate     Minocycline Other (See Comments)     dizziness    Tobramycin-lotepred        Physical Exam:   There is no height or weight on file to calculate BMI.    Physical Exam  Constitutional:       Appearance: Normal appearance.   Eyes:      Extraocular Movements: Extraocular movements intact.   Skin:     General: Skin is warm and dry.   Neurological:      General: No focal deficit present.   Psychiatric:         Mood and Affect: Mood normal.     Detailed MSK exam:     Left Wrist:  Inspection: No effusion, erythema, or ecchymosis   Palpation: Nontender to palpation  Range of motion: Equal bilaterally   Strength:   strength improving  N/V Exam:  Radial: Normal motor (EPL/thumbs up)              Normal sensory (dorsal hand)   Median: Normal motor (FPL/A-OK)      Normal sensory (thumb)   Ulnar:  Normal motor (Interossei/scissors-spread)     Normal sensory (5th finger)   LABC: Normal sensory (lateral forearm)   MABC: Normal sensory (medial forearm)   MC: Normal motor (elbow flexion)   Axillary: Normal motor/sensory (deltoid)  Normal radial and ulnar pulses, warm and well perfused with capillary refill < 2 sec        Imaging:  X-Ray Hand 3 view Left  Narrative: EXAM: XR HAND COMPLETE 3 VIEW LEFT    CLINICAL HISTORY: left 4th metacarpal fracture;    TECHNIQUE: Frontal,  lateral, and oblique views of the left hand.    COMPARISON: Multiple prior x-rays, most recent dated 12/13/2022    FINDINGS:  Healing fourth metacarpal fracture demonstrates no adverse change in alignment.  No new acute fracture or dislocation seen. Bone mineralization is normal. No aggressive lytic or blastic lesion. No osseous erosion or aggressive periosteal reaction seen. Joint spaces are preserved with normal alignment. Soft tissues are unremarkable.  Impression: Healing fourth metacarpal fracture with no adverse change in alignment.    Finalized on: 1/5/2023 9:20 AM By:  Venkat Brumfield MD  [facility]# [id number]      2023-01-05 09:22:[id number]    [facility]      Relevant imaging results were reviewed and interpreted by me and per my read: interval healing of her oblique 4th metacarpal fracture, in stable alignment.      This was discussed with the patient and / or family today.       Patient Instructions   Assessment:  Kayla Lowery is a 52 y.o. female with a chief complaint of Follow-up (L 4th metacarpal fx, 11/9/22)    Encounter Diagnosis   Name Primary?    Displaced fracture of base of fourth metacarpal bone, left hand, subsequent encounter for fracture with routine healing Yes      Plan:  Hand functionality, strength, and pain continues to improve, although still with some strength and ROM deficits, although this is not unexpected after a fracture, and should continue to progressively improve over time. There is a chance for permanent post-traumatic stiffness and/or arthritis  Continue home exercise program, as designed by Occupational therapy  Okay to continue to progressively increase her activity and loading on her hand, with aim of getting back to normal, pre-injury levels over the next couple of weeks  OTC analgesics as needed for pain    Follow-up:  As needed or sooner if there are any problems between now and then.    Thank you for choosing [facility] and Dr. Asad Dean for your  orthopedic & sports medicine care. It is our goal to provide you with exceptional care that will help keep you healthy, active, and get you back in the game.    Please do not hesitate to reach out to us via email, phone, or MyChart with any questions, concerns, or feedback.    If you are experiencing pain/discomfort ,or have questions after 5pm and would like to be connected to the [facility] [place] on-call team, please call this number and specify which Sports Medicine provider is treating you: [phone]        Asad Dean MD  Primary Care Sports Medicine      Disclaimer: This note was prepared using a voice recognition system and is likely to have sound alike errors within the text.

## 2023-02-02 NOTE — TELEPHONE ENCOUNTER
----- Message from Neha Chavez MA sent at 2/2/2023  8:39 AM CST -----    ----- Message -----  From: [place], PharmD  Sent: 2/2/2023   8:30 AM CST  To: Scott Rachel MD, #    Good morning,    We just dispensed to the pt our last 5 tablets of methylphenidate ER 18 mg and its on national backorder. I do have some of the Methylphenidate ER 10 mg and at the moment it is available to order. Would you like to switch to that or something else?    Thanks,    [place]

## 2023-02-03 ENCOUNTER — PATIENT MESSAGE (OUTPATIENT)
Dept: SLEEP MEDICINE | Facility: CLINIC | Age: 53
End: 2023-02-03
Payer: COMMERCIAL

## 2023-02-06 ENCOUNTER — TELEPHONE (OUTPATIENT)
Dept: SLEEP MEDICINE | Facility: CLINIC | Age: 53
End: 2023-02-06
Payer: COMMERCIAL

## 2023-02-14 ENCOUNTER — PATIENT MESSAGE (OUTPATIENT)
Dept: SLEEP MEDICINE | Facility: CLINIC | Age: 53
End: 2023-02-14
Payer: COMMERCIAL

## 2023-02-20 ENCOUNTER — PATIENT MESSAGE (OUTPATIENT)
Dept: SLEEP MEDICINE | Facility: CLINIC | Age: 53
End: 2023-02-20
Payer: COMMERCIAL

## 2023-02-20 DIAGNOSIS — G47.419 PRIMARY NARCOLEPSY WITHOUT CATAPLEXY: ICD-10-CM

## 2023-02-20 RX ORDER — METHYLPHENIDATE HYDROCHLORIDE 18 MG/1
18 TABLET ORAL EVERY MORNING
Qty: 90 TABLET | Refills: 0 | Status: SHIPPED | OUTPATIENT
Start: 2023-02-20 | End: 2023-05-17 | Stop reason: SDUPTHER

## 2023-03-07 ENCOUNTER — PATIENT MESSAGE (OUTPATIENT)
Dept: SLEEP MEDICINE | Facility: CLINIC | Age: 53
End: 2023-03-07
Payer: COMMERCIAL

## 2023-03-07 DIAGNOSIS — G47.419 PRIMARY NARCOLEPSY WITHOUT CATAPLEXY: ICD-10-CM

## 2023-03-07 RX ORDER — METHYLPHENIDATE HYDROCHLORIDE 10 MG/1
10 TABLET ORAL 2 TIMES DAILY
Qty: 120 TABLET | Refills: 0 | Status: SHIPPED | OUTPATIENT
Start: 2023-03-07 | End: 2023-05-17 | Stop reason: SDUPTHER

## 2023-04-19 ENCOUNTER — PATIENT MESSAGE (OUTPATIENT)
Dept: ADMINISTRATIVE | Facility: HOSPITAL | Age: 53
End: 2023-04-19
Payer: COMMERCIAL

## 2023-04-19 NOTE — LETTER
Vamshi Szymanski MD    AUTHORIZATION FOR RELEASE OF   CONFIDENTIAL INFORMATION    We are seeing Kayla Lowery, date of birth 1970, in the clinic at [facility]. Cali Patel MD is the patient's PCP. Kayla Lowery has an outstanding lab/procedure at the time we reviewed her chart. In order to help keep her health information updated, she has authorized us to request the following medical record(s):        (  )  MAMMOGRAM                                      (  )  COLONOSCOPY      (  )  PAP SMEAR                                          (  )  OUTSIDE LAB RESULTS     (  )  DEXA SCAN                                          ( x )  EYE EXAM            (  )  FOOT EXAM                                          (  )  ENTIRE RECORD     (  )  OUTSIDE IMMUNIZATIONS                 (  )  _______________         Please fax records to [facility], Jeryl P Breaux, MD, [phone]     If you have any questions, please contact Neha FOY at [phone].           Patient Name: Kayla Lowery  : 1970  Patient Phone #: [phone]

## 2023-04-19 NOTE — LETTER
Vamshi Angel MD      AUTHORIZATION FOR RELEASE OF   CONFIDENTIAL INFORMATION      We are seeing Kayla Lowery, date of birth 1970, in the clinic at [facility]. Cali Patel MD is the patient's PCP. Kayla Lowery has an outstanding lab/procedure at the time we reviewed her chart. In order to help keep her health information updated, she has authorized us to request the following medical record(s):        (  )  MAMMOGRAM                                      ( x )  COLONOSCOPY      (  )  PAP SMEAR                                          (  )  OUTSIDE LAB RESULTS     (  )  DEXA SCAN                                          (  )  EYE EXAM            (  )  FOOT EXAM                                          (  )  ENTIRE RECORD     (  )  OUTSIDE IMMUNIZATIONS                 ( x )  Pathology report         Please fax records to [facility], Jeryl P Breaux, MD, [phone]     If you have any questions, please contact Neha FOY at [phone].           Patient Name: Kayla Lowery  : 1970  Patient Phone #: [phone]

## 2023-04-22 ENCOUNTER — PATIENT MESSAGE (OUTPATIENT)
Dept: INTERNAL MEDICINE | Facility: CLINIC | Age: 53
End: 2023-04-22
Payer: COMMERCIAL

## 2023-04-22 NOTE — TELEPHONE ENCOUNTER
No new care gaps identified.  Health [facility] Embedded Care Gaps. Reference number: [id number]. 4/22/2023   8:27:01 AM BÁRBARAT

## 2023-04-23 RX ORDER — PROPRANOLOL HYDROCHLORIDE 10 MG/1
10 TABLET ORAL 2 TIMES DAILY PRN
Qty: 90 TABLET | Refills: 2 | Status: SHIPPED | OUTPATIENT
Start: 2023-04-23 | End: 2023-08-14 | Stop reason: SDUPTHER

## 2023-04-23 NOTE — TELEPHONE ENCOUNTER
Refill Decision Note   Kayla Lowery  is requesting a refill authorization.  Brief Assessment and Rationale for Refill:  Approve     Medication Therapy Plan:       Medication Reconciliation Completed: No   Comments:     No Care Gaps recommended.     Note composed:12:55 PM 04/23/2023

## 2023-04-24 DIAGNOSIS — Z12.31 ENCOUNTER FOR SCREENING MAMMOGRAM FOR BREAST CANCER: Primary | ICD-10-CM

## 2023-04-25 DIAGNOSIS — L71.9 ROSACEA: ICD-10-CM

## 2023-04-25 RX ORDER — DOXYCYCLINE HYCLATE 50 MG/1
50 CAPSULE ORAL DAILY
Qty: 30 CAPSULE | Refills: 5 | OUTPATIENT
Start: 2023-04-25 | End: 2023-05-25

## 2023-04-27 ENCOUNTER — LAB VISIT (OUTPATIENT)
Dept: LAB | Facility: HOSPITAL | Age: 53
End: 2023-04-27
Attending: INTERNAL MEDICINE
Payer: COMMERCIAL

## 2023-04-27 ENCOUNTER — OFFICE VISIT (OUTPATIENT)
Dept: RHEUMATOLOGY | Facility: CLINIC | Age: 53
End: 2023-04-27
Payer: COMMERCIAL

## 2023-04-27 VITALS
DIASTOLIC BLOOD PRESSURE: 94 MMHG | SYSTOLIC BLOOD PRESSURE: 153 MMHG | WEIGHT: 98.75 LBS | BODY MASS INDEX: 18.17 KG/M2 | HEART RATE: 61 BPM | HEIGHT: 62 IN

## 2023-04-27 DIAGNOSIS — F41.9 ANXIETY: ICD-10-CM

## 2023-04-27 DIAGNOSIS — F98.8 ATTENTION DEFICIT DISORDER, UNSPECIFIED HYPERACTIVITY PRESENCE: ICD-10-CM

## 2023-04-27 DIAGNOSIS — M79.89 FINGER SWELLING: ICD-10-CM

## 2023-04-27 DIAGNOSIS — M79.89 FINGER SWELLING: Primary | ICD-10-CM

## 2023-04-27 DIAGNOSIS — L71.9 ROSACEA: ICD-10-CM

## 2023-04-27 DIAGNOSIS — E50.7 XEROPHTHALMIA: ICD-10-CM

## 2023-04-27 DIAGNOSIS — M15.9 PRIMARY OSTEOARTHRITIS INVOLVING MULTIPLE JOINTS: ICD-10-CM

## 2023-04-27 DIAGNOSIS — M19.90 INFLAMMATORY ARTHRITIS: ICD-10-CM

## 2023-04-27 DIAGNOSIS — G43.809 OTHER MIGRAINE WITHOUT STATUS MIGRAINOSUS, NOT INTRACTABLE: ICD-10-CM

## 2023-04-27 LAB — URATE SERPL-MCNC: 5.1 MG/DL (ref 2.4–5.7)

## 2023-04-27 PROCEDURE — 3077F PR MOST RECENT SYSTOLIC BLOOD PRESSURE >= 140 MM HG: ICD-10-PCS | Mod: CPTII,S$GLB,, | Performed by: INTERNAL MEDICINE

## 2023-04-27 PROCEDURE — 99204 PR OFFICE/OUTPT VISIT, NEW, LEVL IV, 45-59 MIN: ICD-10-PCS | Mod: S$GLB,,, | Performed by: INTERNAL MEDICINE

## 2023-04-27 PROCEDURE — 3080F PR MOST RECENT DIASTOLIC BLOOD PRESSURE >= 90 MM HG: ICD-10-PCS | Mod: CPTII,S$GLB,, | Performed by: INTERNAL MEDICINE

## 2023-04-27 PROCEDURE — 86480 TB TEST CELL IMMUN MEASURE: CPT | Performed by: INTERNAL MEDICINE

## 2023-04-27 PROCEDURE — 86431 RHEUMATOID FACTOR QUANT: CPT | Performed by: INTERNAL MEDICINE

## 2023-04-27 PROCEDURE — 99999 PR PBB SHADOW E&M-EST. PATIENT-LVL IV: CPT | Mod: PBBFAC,,, | Performed by: INTERNAL MEDICINE

## 2023-04-27 PROCEDURE — 3077F SYST BP >= 140 MM HG: CPT | Mod: CPTII,S$GLB,, | Performed by: INTERNAL MEDICINE

## 2023-04-27 PROCEDURE — 1159F MED LIST DOCD IN RCRD: CPT | Mod: CPTII,S$GLB,, | Performed by: INTERNAL MEDICINE

## 2023-04-27 PROCEDURE — 86235 NUCLEAR ANTIGEN ANTIBODY: CPT | Mod: 59 | Performed by: INTERNAL MEDICINE

## 2023-04-27 PROCEDURE — 3008F BODY MASS INDEX DOCD: CPT | Mod: CPTII,S$GLB,, | Performed by: INTERNAL MEDICINE

## 2023-04-27 PROCEDURE — 81374 HLA I TYPING 1 ANTIGEN LR: CPT | Performed by: INTERNAL MEDICINE

## 2023-04-27 PROCEDURE — 99204 OFFICE O/P NEW MOD 45 MIN: CPT | Mod: S$GLB,,, | Performed by: INTERNAL MEDICINE

## 2023-04-27 PROCEDURE — 84550 ASSAY OF BLOOD/URIC ACID: CPT | Performed by: INTERNAL MEDICINE

## 2023-04-27 PROCEDURE — 86038 ANTINUCLEAR ANTIBODIES: CPT | Performed by: INTERNAL MEDICINE

## 2023-04-27 PROCEDURE — 3044F HG A1C LEVEL LT 7.0%: CPT | Mod: CPTII,S$GLB,, | Performed by: INTERNAL MEDICINE

## 2023-04-27 PROCEDURE — 1159F PR MEDICATION LIST DOCUMENTED IN MEDICAL RECORD: ICD-10-PCS | Mod: CPTII,S$GLB,, | Performed by: INTERNAL MEDICINE

## 2023-04-27 PROCEDURE — 3044F PR MOST RECENT HEMOGLOBIN A1C LEVEL <7.0%: ICD-10-PCS | Mod: CPTII,S$GLB,, | Performed by: INTERNAL MEDICINE

## 2023-04-27 PROCEDURE — 3080F DIAST BP >= 90 MM HG: CPT | Mod: CPTII,S$GLB,, | Performed by: INTERNAL MEDICINE

## 2023-04-27 PROCEDURE — 99999 PR PBB SHADOW E&M-EST. PATIENT-LVL IV: ICD-10-PCS | Mod: PBBFAC,,, | Performed by: INTERNAL MEDICINE

## 2023-04-27 PROCEDURE — 86200 CCP ANTIBODY: CPT | Performed by: INTERNAL MEDICINE

## 2023-04-27 PROCEDURE — 87340 HEPATITIS B SURFACE AG IA: CPT | Performed by: INTERNAL MEDICINE

## 2023-04-27 PROCEDURE — 83516 IMMUNOASSAY NONANTIBODY: CPT | Mod: 59 | Performed by: INTERNAL MEDICINE

## 2023-04-27 PROCEDURE — 3008F PR BODY MASS INDEX (BMI) DOCUMENTED: ICD-10-PCS | Mod: CPTII,S$GLB,, | Performed by: INTERNAL MEDICINE

## 2023-04-27 PROCEDURE — 86704 HEP B CORE ANTIBODY TOTAL: CPT | Performed by: INTERNAL MEDICINE

## 2023-04-27 RX ORDER — HYDROXYCHLOROQUINE SULFATE 200 MG/1
200 TABLET, FILM COATED ORAL DAILY
Qty: 90 TABLET | Refills: 1 | Status: SHIPPED | OUTPATIENT
Start: 2023-04-27 | End: 2023-08-16 | Stop reason: SDUPTHER

## 2023-04-27 RX ORDER — PREDNISONE 20 MG/1
20 TABLET ORAL DAILY
Qty: 7 TABLET | Refills: 0 | Status: SHIPPED | OUTPATIENT
Start: 2023-04-27 | End: 2023-05-04

## 2023-04-27 RX ORDER — DOXYCYCLINE HYCLATE 50 MG/1
50 CAPSULE ORAL DAILY
Qty: 30 CAPSULE | Refills: 5 | OUTPATIENT
Start: 2023-04-27 | End: 2023-05-27

## 2023-04-27 NOTE — PROGRESS NOTES
RHEUMATOLOGY OUTPATIENT CLINIC NOTE    4/27/2023    Attending Rheumatologist: Jose Angel Glover  Primary Care Provider/Physician Requesting Consultation: Cali Patel MD   Chief Complaint/Reason For Consultation:  Joint Pain      Subjective:     Kayla Lowery is a 53 y.o. White female with new onset finger swelling for evaluation.    Main concern of 1 day onset of 3rd PIPj swelling. Denies association w/ trauma or prior event.    Review of Systems   Constitutional:  Negative for fever.   HENT:  Negative for nosebleeds.         Mild xerophthalmia   Eyes:  Negative for photophobia and pain.        N o hx glaucoma   Respiratory:  Negative for cough and shortness of breath.         No hx of asthma   Gastrointestinal:  Negative for blood in stool and melena.        Denies dysphagia.  No hx of diverticulosis   Genitourinary:  Negative for hematuria.        No hx of nephrolithiasis   Musculoskeletal:  Positive for joint pain. Negative for back pain.        No hx of gout.  Denies classic gout precipitants   Skin:  Negative for rash.        No hx PsO.  Denies RP   Neurological:  Negative for focal weakness and headaches.   Endo/Heme/Allergies:         No hx of DVT/PE     Chronic comorbid conditions affecting medical decision making today:  Past Medical History:   Diagnosis Date    ADHD (attention deficit hyperactivity disorder)     Anxiety     History of abnormal cervical Pap smear 2012    Hypertension     Migraines     Narcolepsy     Rosacea      History reviewed. No pertinent surgical history.  Family History   Problem Relation Age of Onset    Hypertension Mother     Heart disease Mother     Parkinsonism Mother     Coronary artery disease Mother     Diabetes Mother     Hyperlipidemia Mother     Depression Mother     Hearing loss Mother     Alcohol abuse Father         in remission    Drug abuse Father         in remission    Bladder Cancer Maternal Grandfather     Heart disease Maternal Grandfather     Hypertension  Paternal Grandfather     Heart disease Paternal Grandfather     Prostate cancer Paternal Grandfather     Macular degeneration Maternal Aunt     Arthritis Sister     Depression Sister     Stroke Sister     Arthritis Sister     Depression Sister     Hypertension Sister     Asthma Son     Learning disabilities Son     Diabetes Maternal Aunt     Hyperlipidemia Maternal Aunt      Social History     Tobacco Use   Smoking Status Never   Smokeless Tobacco Never       Current Outpatient Medications:     doxycycline 50 MG capsule, Take 1 capsule (50 mg total) by mouth once daily. With food. Avoid lying down for 1 hour after taking. Avoid the sun., Disp: 30 capsule, Rfl: 5    FLUoxetine 20 MG tablet, Take 1 tablet (20 mg total) by mouth once daily., Disp: 90 tablet, Rfl: 3    methylphenidate HCl (RITALIN) 10 MG tablet, Take 1 tablet (10 mg total) by mouth 2 (two) times daily., Disp: 120 tablet, Rfl: 0    methylphenidate HCl 18 MG CR tablet, Take 1 tablet (18 mg total) by mouth every morning., Disp: 90 tablet, Rfl: 0    ondansetron (ZOFRAN) 8 MG tablet, Take 1 tablet (8 mg total) by mouth every 8 (eight) hours as needed., Disp: 30 tablet, Rfl: 0    oxymetazoline (RHOFADE) 1 % Crea, Apply daily to affected area, Disp: 30 g, Rfl: 11    propranoloL (INDERAL) 10 MG tablet, Take 1 tablet (10 mg total) by mouth 2 (two) times daily as needed., Disp: 90 tablet, Rfl: 2    rizatriptan (MAXALT-MLT) 10 MG disintegrating tablet, Take 1 tablet (10 mg total) by mouth as needed for Migraine., Disp: 9 tablet, Rfl: 1    tacrolimus (PROTOPIC) 0.1 % ointment, Apply topically 2 (two) times daily., Disp: 60 g, Rfl: 2    valACYclovir (VALTREX) 1000 MG tablet, Take 2 tablets by mouth twice daily for 2 days as needed first signs of outbreak, Disp: 30 tablet, Rfl: 3    XYWAV 0.5 gram/mL Soln, Take 3.75 g by mouth 2 (two) times a day. 1st dose: @ 8:30 pm, 3 g @ 12 MN, Disp: , Rfl:     cetirizine (ZYRTEC) 10 MG tablet, Take 10 mg by mouth once daily.,  Disp: , Rfl:     metronidazole 0.75% (METROCREAM) 0.75 % Crea, Apply topically 2 (two) times daily., Disp: 45 g, Rfl: 3     Objective:     Vitals:    04/27/23 1100   BP: (!) 153/94   Pulse: 61     Physical Exam   Eyes: Conjunctivae are normal.   Pulmonary/Chest: Effort normal. No respiratory distress.   Musculoskeletal:         General: Swelling, tenderness and deformity present. Normal range of motion.   Neurological: She displays no weakness.   Skin: No rash noted.     Reviewed available old and all outside pertinent medical records available.    All lab results personally reviewed and interpreted by me.       ASSESSMENT      Encounter Diagnoses   Name Primary?    Finger swelling Yes    Inflammatory arthritis     Primary osteoarthritis involving multiple joints     Attention deficit disorder, unspecified hyperactivity presence     Anxiety     Other migraine without status migrainosus, not intractable     Rosacea       PLAN     Acute left middle finger swelling.  Denies association with trauma / injury.  Fusiform swelling 3rd PIPj left hand.  No squeeze tenderness on other joints.  No weakness or active rashes.  Kaitlin on file negative.  No concerning cytopenias or liver dysfunction. No nephropathy.  DJD changes on imaging.  No erosions or chondrocalcinosis reported.  No findings of ILD, probable COPD on CXR.  Suggest w/u for gout, RA, and SjS.  PDN course for immediate relief.  HCQ for long term therapy.  Low purine diet.  Repeat labs close to f/u visit.     Jose Angel Glover M.D.

## 2023-04-28 LAB
ANA SER QL IF: NORMAL
CCP AB SER IA-ACNC: 0.5 U/ML
GAMMA INTERFERON BACKGROUND BLD IA-ACNC: 0.03 IU/ML
HBV CORE AB SERPL QL IA: NORMAL
HBV SURFACE AG SERPL QL IA: NORMAL
M TB IFN-G CD4+ BCKGRND COR BLD-ACNC: -0.03 IU/ML
MITOGEN IGNF BCKGRD COR BLD-ACNC: 9.97 IU/ML
RHEUMATOID FACT SERPL-ACNC: <13 IU/ML (ref 0–15)
TB GOLD PLUS: NEGATIVE
TB2 - NIL: -0.03 IU/ML

## 2023-05-04 ENCOUNTER — PATIENT MESSAGE (OUTPATIENT)
Dept: RHEUMATOLOGY | Facility: CLINIC | Age: 53
End: 2023-05-04
Payer: COMMERCIAL

## 2023-05-04 DIAGNOSIS — L71.9 ROSACEA: ICD-10-CM

## 2023-05-04 RX ORDER — DOXYCYCLINE HYCLATE 50 MG/1
50 CAPSULE ORAL DAILY
Qty: 30 CAPSULE | Refills: 5 | OUTPATIENT
Start: 2023-05-04 | End: 2023-06-03

## 2023-05-08 ENCOUNTER — PATIENT MESSAGE (OUTPATIENT)
Dept: DERMATOLOGY | Facility: CLINIC | Age: 53
End: 2023-05-08
Payer: COMMERCIAL

## 2023-05-09 LAB
HLA B27 INTERPRETATION: NORMAL
HLA-B27 RELATED AG QL: NEGATIVE
HLA-B27 RELATED AG QL: NORMAL

## 2023-05-10 DIAGNOSIS — L71.9 ROSACEA: Primary | ICD-10-CM

## 2023-05-10 RX ORDER — DOXYCYCLINE HYCLATE 50 MG/1
50 TABLET, FILM COATED ORAL DAILY
Qty: 30 TABLET | Refills: 2 | Status: SHIPPED | OUTPATIENT
Start: 2023-05-10 | End: 2023-07-06 | Stop reason: SDUPTHER

## 2023-05-11 ENCOUNTER — OFFICE VISIT (OUTPATIENT)
Dept: SLEEP MEDICINE | Facility: CLINIC | Age: 53
End: 2023-05-11
Payer: COMMERCIAL

## 2023-05-11 VITALS
SYSTOLIC BLOOD PRESSURE: 104 MMHG | HEIGHT: 62 IN | WEIGHT: 99.44 LBS | RESPIRATION RATE: 16 BRPM | OXYGEN SATURATION: 100 % | HEART RATE: 65 BPM | BODY MASS INDEX: 18.3 KG/M2 | DIASTOLIC BLOOD PRESSURE: 68 MMHG

## 2023-05-11 DIAGNOSIS — I10 PRIMARY HYPERTENSION: ICD-10-CM

## 2023-05-11 DIAGNOSIS — G47.419 PRIMARY NARCOLEPSY WITHOUT CATAPLEXY: Primary | ICD-10-CM

## 2023-05-11 PROCEDURE — 1159F MED LIST DOCD IN RCRD: CPT | Mod: CPTII,S$GLB,, | Performed by: INTERNAL MEDICINE

## 2023-05-11 PROCEDURE — 3078F DIAST BP <80 MM HG: CPT | Mod: CPTII,S$GLB,, | Performed by: INTERNAL MEDICINE

## 2023-05-11 PROCEDURE — 99999 PR PBB SHADOW E&M-EST. PATIENT-LVL IV: ICD-10-PCS | Mod: PBBFAC,,, | Performed by: INTERNAL MEDICINE

## 2023-05-11 PROCEDURE — 3008F BODY MASS INDEX DOCD: CPT | Mod: CPTII,S$GLB,, | Performed by: INTERNAL MEDICINE

## 2023-05-11 PROCEDURE — 99999 PR PBB SHADOW E&M-EST. PATIENT-LVL IV: CPT | Mod: PBBFAC,,, | Performed by: INTERNAL MEDICINE

## 2023-05-11 PROCEDURE — 3078F PR MOST RECENT DIASTOLIC BLOOD PRESSURE < 80 MM HG: ICD-10-PCS | Mod: CPTII,S$GLB,, | Performed by: INTERNAL MEDICINE

## 2023-05-11 PROCEDURE — 1159F PR MEDICATION LIST DOCUMENTED IN MEDICAL RECORD: ICD-10-PCS | Mod: CPTII,S$GLB,, | Performed by: INTERNAL MEDICINE

## 2023-05-11 PROCEDURE — 3074F SYST BP LT 130 MM HG: CPT | Mod: CPTII,S$GLB,, | Performed by: INTERNAL MEDICINE

## 2023-05-11 PROCEDURE — 1160F PR REVIEW ALL MEDS BY PRESCRIBER/CLIN PHARMACIST DOCUMENTED: ICD-10-PCS | Mod: CPTII,S$GLB,, | Performed by: INTERNAL MEDICINE

## 2023-05-11 PROCEDURE — 3044F PR MOST RECENT HEMOGLOBIN A1C LEVEL <7.0%: ICD-10-PCS | Mod: CPTII,S$GLB,, | Performed by: INTERNAL MEDICINE

## 2023-05-11 PROCEDURE — 1160F RVW MEDS BY RX/DR IN RCRD: CPT | Mod: CPTII,S$GLB,, | Performed by: INTERNAL MEDICINE

## 2023-05-11 PROCEDURE — 3044F HG A1C LEVEL LT 7.0%: CPT | Mod: CPTII,S$GLB,, | Performed by: INTERNAL MEDICINE

## 2023-05-11 PROCEDURE — 99213 PR OFFICE/OUTPT VISIT, EST, LEVL III, 20-29 MIN: ICD-10-PCS | Mod: S$GLB,,, | Performed by: INTERNAL MEDICINE

## 2023-05-11 PROCEDURE — 3074F PR MOST RECENT SYSTOLIC BLOOD PRESSURE < 130 MM HG: ICD-10-PCS | Mod: CPTII,S$GLB,, | Performed by: INTERNAL MEDICINE

## 2023-05-11 PROCEDURE — 99213 OFFICE O/P EST LOW 20 MIN: CPT | Mod: S$GLB,,, | Performed by: INTERNAL MEDICINE

## 2023-05-11 PROCEDURE — 3008F PR BODY MASS INDEX (BMI) DOCUMENTED: ICD-10-PCS | Mod: CPTII,S$GLB,, | Performed by: INTERNAL MEDICINE

## 2023-05-11 RX ORDER — RIZATRIPTAN BENZOATE 10 MG/1
10 TABLET, ORALLY DISINTEGRATING ORAL
Qty: 9 TABLET | Refills: 1 | Status: SHIPPED | OUTPATIENT
Start: 2023-05-11

## 2023-05-11 NOTE — TELEPHONE ENCOUNTER
Refill Routing Note   Medication(s) are not appropriate for processing by [facility] for the following reason(s):      Required vitals abnormal    [facility] action(s):  Defer Care Due:  None identified          Appointments  past 12m or future 3m with PCP    Date Provider   Last Visit   1/5/2023 Cali Patel MD   Next Visit   Visit date not found Cali Patel MD   ED visits in past 90 days: 0        Note composed:12:32 PM 05/11/2023

## 2023-05-11 NOTE — PROGRESS NOTES
Pulmonary Outpatient  Visit     Subjective:       Patient ID: Kayla Lowery is a 53 y.o. female.    Chief Complaint: Narcolepsy         Kayla Lowery is 52 y.o.  Physician: [facility]  Dx Narcolepsy followed by Dr Steve GIPSON  Son rosy has narcolepsy  Xywav and methylphenidate  Sleep study was done in 2006:   Displaced here after [place]  Likely had narcolepsy since 12 years, Had ADHD  Since will fall asleep during tests  Seen psychologist and neurologist  Bed time: 9-0930pm  Wake time: 0430 am  SOL 15 mins  No problems with Meds  Notes reviewed  Had tremor  Sleep paralysis and hallucination Last event  2014    Last event: cannot remember  No MVA     Notes reveiwed  1. Primary narcolepsy without cataplexy     PLAN    Kayla was seen today for narcolepsy without cataplexy.    Diagnoses and all orders for this visit:    Primary narcolepsy without cataplexy    1. Do Not Drive if you are sleepy!  2. Will continue xywav at 3.75g and 3 grams at night.Will change the dosing with new rx.   3. Continue methylphenidate 18 mg po daily. And ritalin 10 mg BID Prn.    Electronically signed by Rodo Wilson MD at 09/14/2021 1:42 PM CDT          08/11/2022  Last 04/26/2022  Doing well on Xywav 1 st dose 3.75gm and 2nd dose 3 gm  Wake up 05:30 am  Doing well in day time   Takes concerta in AM  Lasts 6 hrs  methyphenolate x 2 in PM  Inderal for tachy and tremors    12/08/2022  Last visit 08/11/2022  Doing well on Xywav 1 st dose 3.75gm and 2nd dose 3 gm  Wake up 05:30 am  Doing well in day time   Takes concerta in AM  Lasts 6 hrs  methyphenolate x 2 in PM  Inderal for tachy and tremors  Recent left hand #  Consider BRAND CONCERTA  Asked about WAKIX: will interact with PROZAC    05/11/2023  Fopllow up   Narcolepsy  [place]  Doing well on Xywav 1 st dose ( 09:30 pm)  3.75gm and 2nd dose 3 gm ( 1 am)  Wake up 05:30 am  Doing well in day time   No side effect  Propranolol for  tremor  Takes concerta in AM  Lasts 6 hrs  methyphenolate x 2 in PM  Epworth7  CBC and TSH reveiwed  Recently started Plaquenil    EPWORTH SLEEPINESS SCALE 12/8/2022   Sitting and reading 0   Watching TV 0   Sitting, inactive in a public place (e.g. a theatre or a meeting) 0   As a passenger in a car for an hour without a break 0   Lying down to rest in the afternoon when circumstances permit 0   Sitting and talking to someone 0   Sitting quietly after a lunch without alcohol 0   In a car, while stopped for a few minutes in traffic 0   Total score 0          The following portions of the patient's history were reviewed and updated as appropriate:   She  has a past medical history of ADHD (attention deficit hyperactivity disorder), Anxiety, History of abnormal cervical Pap smear (2012), Hypertension, Migraines, Narcolepsy, and Rosacea.  She does not have any pertinent problems on file.  She  has no past surgical history on file.  Her family history includes Alcohol abuse in her father; Arthritis in her sister and sister; Asthma in her son; Bladder Cancer in her maternal grandfather; Coronary artery disease in her mother; Depression in her mother, sister, and sister; Diabetes in her maternal aunt and mother; Drug abuse in her father; Hearing loss in her mother; Heart disease in her maternal grandfather, mother, and paternal grandfather; Hyperlipidemia in her maternal aunt and mother; Hypertension in her mother, paternal grandfather, and sister; Learning disabilities in her son; Macular degeneration in her maternal aunt; Parkinsonism in her mother; Prostate cancer in her paternal grandfather; Stroke in her sister.  She  reports that she has never smoked. She has never used smokeless tobacco. She reports that she does not currently use alcohol after a past usage of about 1.0 standard drink per week. She reports that she does not use drugs.  She has a current medication list which includes the following prescription(s):  cetirizine, doxycycline hyclate, fluoxetine, hydroxychloroquine, methylphenidate hcl, ondansetron, oxymetazoline, propranolol, rizatriptan, tacrolimus, valacyclovir, xywav, and metronidazole 0.75%.  Current Outpatient Medications on File Prior to Visit   Medication Sig Dispense Refill    cetirizine (ZYRTEC) 10 MG tablet Take 10 mg by mouth once daily.      doxycycline hyclate 50 mg Tab Take 50 mg by mouth once daily. Take 1 by mouth everyday with food and water. Avoid lying down for 1 hour after taking. Avoid the sun. 30 tablet 2    FLUoxetine 20 MG tablet Take 1 tablet (20 mg total) by mouth once daily. 90 tablet 3    hydrOXYchloroQUINE (PLAQUENIL) 200 mg tablet Take 1 tablet (200 mg total) by mouth once daily. 90 tablet 1    methylphenidate HCl 18 MG CR tablet Take 1 tablet (18 mg total) by mouth every morning. 90 tablet 0    ondansetron (ZOFRAN) 8 MG tablet Take 1 tablet (8 mg total) by mouth every 8 (eight) hours as needed. 30 tablet 0    oxymetazoline (RHOFADE) 1 % Crea Apply daily to affected area 30 g 11    propranoloL (INDERAL) 10 MG tablet Take 1 tablet (10 mg total) by mouth 2 (two) times daily as needed. 90 tablet 2    rizatriptan (MAXALT-MLT) 10 MG disintegrating tablet Take 1 tablet (10 mg total) by mouth as needed for Migraine. 9 tablet 1    tacrolimus (PROTOPIC) 0.1 % ointment Apply topically 2 (two) times daily. 60 g 2    valACYclovir (VALTREX) 1000 MG tablet Take 2 tablets by mouth twice daily for 2 days as needed first signs of outbreak 30 tablet 3    XYWAV 0.5 gram/mL Soln Take 3.75 g by mouth 2 (two) times a day. 1st dose: @ 8:30 pm, 3 g @ 12 MN      metronidazole 0.75% (METROCREAM) 0.75 % Crea Apply topically 2 (two) times daily. 45 g 3     No current facility-administered medications on file prior to visit.     She is allergic to gold sodium thiosulfate, imidazolidinyl urea, codeine phosphate, minocycline, and tobramycin-lotepred..      Review of Systems   Constitutional:  Negative for fatigue.  "  Respiratory: Negative.  Negative for apnea and snoring.    Psychiatric/Behavioral:  Negative for sleep disturbance.    All other systems reviewed and are negative.    Outpatient Encounter Medications as of 5/11/2023   Medication Sig Dispense Refill    cetirizine (ZYRTEC) 10 MG tablet Take 10 mg by mouth once daily.      doxycycline hyclate 50 mg Tab Take 50 mg by mouth once daily. Take 1 by mouth everyday with food and water. Avoid lying down for 1 hour after taking. Avoid the sun. 30 tablet 2    FLUoxetine 20 MG tablet Take 1 tablet (20 mg total) by mouth once daily. 90 tablet 3    hydrOXYchloroQUINE (PLAQUENIL) 200 mg tablet Take 1 tablet (200 mg total) by mouth once daily. 90 tablet 1    methylphenidate HCl 18 MG CR tablet Take 1 tablet (18 mg total) by mouth every morning. 90 tablet 0    ondansetron (ZOFRAN) 8 MG tablet Take 1 tablet (8 mg total) by mouth every 8 (eight) hours as needed. 30 tablet 0    oxymetazoline (RHOFADE) 1 % Crea Apply daily to affected area 30 g 11    propranoloL (INDERAL) 10 MG tablet Take 1 tablet (10 mg total) by mouth 2 (two) times daily as needed. 90 tablet 2    rizatriptan (MAXALT-MLT) 10 MG disintegrating tablet Take 1 tablet (10 mg total) by mouth as needed for Migraine. 9 tablet 1    tacrolimus (PROTOPIC) 0.1 % ointment Apply topically 2 (two) times daily. 60 g 2    valACYclovir (VALTREX) 1000 MG tablet Take 2 tablets by mouth twice daily for 2 days as needed first signs of outbreak 30 tablet 3    XYWAV 0.5 gram/mL Soln Take 3.75 g by mouth 2 (two) times a day. 1st dose: @ 8:30 pm, 3 g @ 12 MN      metronidazole 0.75% (METROCREAM) 0.75 % Crea Apply topically 2 (two) times daily. 45 g 3     No facility-administered encounter medications on file as of 5/11/2023.       Objective:     Vital Signs (Most Recent)  Vital Signs  Pulse: 65  Resp: 16  SpO2: 100 %  BP: 104/68  Height and Weight  Height: 5' 2" (157.5 cm)  Weight: 45.1 kg (99 lb 6.8 oz)  BSA (Calculated - sq m): 1.4 sq " meters  BMI (Calculated): 18.2  Weight in (lb) to have BMI = 25: 136.4]  Wt Readings from Last 2 Encounters:   05/11/23 45.1 kg (99 lb 6.8 oz)   04/27/23 44.8 kg (98 lb 12.3 oz)       Physical Exam   Constitutional: She appears well-developed and well-nourished.   Nursing note and vitals reviewed.    Laboratory  Lab Results   Component Value Date    WBC 5.45 01/05/2023    RBC 4.40 01/05/2023    HGB 13.6 01/05/2023    HCT 38.8 01/05/2023    MCV 88 01/05/2023    MCH 30.9 01/05/2023    MCHC 35.1 01/05/2023    RDW 11.9 01/05/2023     01/05/2023    MPV 9.1 (L) 01/05/2023    GRAN 2.7 02/06/2007    GRAN 55.9 02/06/2007    LYMPH 35.6 02/06/2007    LYMPH 1.8 02/06/2007    MONO 6.7 02/06/2007    MONO 0.3 02/06/2007    EOS 0.1 02/06/2007    BASO 0.0 02/06/2007    EOSINOPHIL 1.4 02/06/2007    BASOPHIL 0.4 02/06/2007       BMP  Lab Results   Component Value Date     01/05/2023    K 4.4 01/05/2023     01/05/2023    CO2 27 01/05/2023    BUN 13 01/05/2023    CREATININE 0.7 01/05/2023    CALCIUM 9.6 01/05/2023    ANIONGAP 8 01/05/2023    ESTGFRAFRICA >60 01/11/2022    EGFRNONAA >60 01/11/2022    AST 22 01/05/2023    ALT 25 01/05/2023    PROT 6.8 01/05/2023       No results found for: BNP    Lab Results   Component Value Date    TSH 0.960 01/05/2023       No results found for: SEDRATE    No results found for: CRP  No results found for: IGE     No results found for: ASPERGILLUS  No results found for: AFUMIGATUSCL     No results found for: ACE      Assessment/Plan:     Problem List Items Addressed This Visit       Narcolepsy - Primary     [place]: 6  Night : XYWAV  Day time : CONCERTA, methyphenodate 18 mg on AM and 10 mg in PM           Hypertension     Stable on INDERAL              Follow up in about 4 months (around 9/11/2023), or meds refill, adherent and benefits to therapy.    This note was prepared using voice recognition system and is likely to have sound alike errors that may have been overlooked even after  proof reading.  Please call me with any questions    Discussed diagnosis, its evaluation, treatment and usual course. All questions answered.      Scott Rachel MD

## 2023-05-11 NOTE — TELEPHONE ENCOUNTER
No care due was identified.  [facility] Embedded Care Due Messages. Reference number: [id number].   5/11/2023 8:22:57 AM CDT

## 2023-05-16 LAB
ANTI-PM/SCL AB: <20 UNITS
ANTI-SS-A 52 KD AB, IGG: <20 UNITS
EJ AB SER QL: NEGATIVE
ENA JO1 AB SER IA-ACNC: <20 UNITS
ENA SM+RNP AB SER IA-ACNC: <20 UNITS
FIBRILLARIN (U3 RNP): NEGATIVE
KU AB SER QL: NEGATIVE
MDA-5 (P140): <20 UNITS
MI2 AB SER QL: NEGATIVE
NXP-2 (P140): <20 UNITS
OJ AB SER QL: NEGATIVE
PL12 AB SER QL: NEGATIVE
PL7 AB SER QL: NEGATIVE
SRP AB SERPL QL: NEGATIVE
TIF1 GAMMA (P155/140): <20 UNITS
U2 SNRNP: NEGATIVE

## 2023-05-17 DIAGNOSIS — G47.419 PRIMARY NARCOLEPSY WITHOUT CATAPLEXY: ICD-10-CM

## 2023-05-17 DIAGNOSIS — L71.9 ROSACEA: ICD-10-CM

## 2023-05-17 RX ORDER — METHYLPHENIDATE HYDROCHLORIDE 10 MG/1
10 TABLET ORAL 2 TIMES DAILY
Qty: 120 TABLET | Refills: 0 | Status: SHIPPED | OUTPATIENT
Start: 2023-05-17 | End: 2023-07-17 | Stop reason: SDUPTHER

## 2023-05-17 RX ORDER — METHYLPHENIDATE HYDROCHLORIDE 18 MG/1
18 TABLET ORAL EVERY MORNING
Qty: 90 TABLET | Refills: 0 | Status: SHIPPED | OUTPATIENT
Start: 2023-05-17 | End: 2023-08-14 | Stop reason: SDUPTHER

## 2023-05-18 ENCOUNTER — PATIENT MESSAGE (OUTPATIENT)
Dept: RHEUMATOLOGY | Facility: CLINIC | Age: 53
End: 2023-05-18
Payer: COMMERCIAL

## 2023-05-18 RX ORDER — METRONIDAZOLE 7.5 MG/G
CREAM TOPICAL 2 TIMES DAILY
Qty: 45 G | Refills: 3 | Status: SHIPPED | OUTPATIENT
Start: 2023-05-18 | End: 2024-05-17

## 2023-05-19 ENCOUNTER — PATIENT MESSAGE (OUTPATIENT)
Dept: SLEEP MEDICINE | Facility: CLINIC | Age: 53
End: 2023-05-19
Payer: COMMERCIAL

## 2023-05-24 ENCOUNTER — PATIENT MESSAGE (OUTPATIENT)
Dept: SLEEP MEDICINE | Facility: CLINIC | Age: 53
End: 2023-05-24
Payer: COMMERCIAL

## 2023-06-01 ENCOUNTER — HOSPITAL ENCOUNTER (OUTPATIENT)
Dept: RADIOLOGY | Facility: HOSPITAL | Age: 53
Discharge: HOME | End: 2023-06-01
Attending: INTERNAL MEDICINE
Payer: COMMERCIAL

## 2023-06-01 DIAGNOSIS — Z12.31 ENCOUNTER FOR SCREENING MAMMOGRAM FOR BREAST CANCER: ICD-10-CM

## 2023-06-01 PROCEDURE — 77063 BREAST TOMOSYNTHESIS BI: CPT | Mod: 26,,, | Performed by: RADIOLOGY

## 2023-06-01 PROCEDURE — 77063 MAMMO DIGITAL SCREENING BILAT WITH TOMO: ICD-10-PCS | Mod: 26,,, | Performed by: RADIOLOGY

## 2023-06-01 PROCEDURE — 77067 MAMMO DIGITAL SCREENING BILAT WITH TOMO: ICD-10-PCS | Mod: 26,,, | Performed by: RADIOLOGY

## 2023-06-01 PROCEDURE — 77067 SCR MAMMO BI INCL CAD: CPT | Mod: 26,,, | Performed by: RADIOLOGY

## 2023-06-01 PROCEDURE — 77067 SCR MAMMO BI INCL CAD: CPT | Mod: TC

## 2023-07-06 ENCOUNTER — PATIENT MESSAGE (OUTPATIENT)
Dept: OPHTHALMOLOGY | Facility: CLINIC | Age: 53
End: 2023-07-06

## 2023-07-06 ENCOUNTER — OFFICE VISIT (OUTPATIENT)
Dept: DERMATOLOGY | Facility: CLINIC | Age: 53
End: 2023-07-06
Payer: COMMERCIAL

## 2023-07-06 ENCOUNTER — TELEPHONE (OUTPATIENT)
Dept: PULMONOLOGY | Facility: CLINIC | Age: 53
End: 2023-07-06
Payer: COMMERCIAL

## 2023-07-06 ENCOUNTER — PATIENT MESSAGE (OUTPATIENT)
Dept: DERMATOLOGY | Facility: CLINIC | Age: 53
End: 2023-07-06
Payer: COMMERCIAL

## 2023-07-06 ENCOUNTER — OFFICE VISIT (OUTPATIENT)
Dept: OPHTHALMOLOGY | Facility: CLINIC | Age: 53
End: 2023-07-06
Payer: COMMERCIAL

## 2023-07-06 DIAGNOSIS — H04.129 DRY EYE: Primary | ICD-10-CM

## 2023-07-06 DIAGNOSIS — H52.203 MYOPIA WITH ASTIGMATISM AND PRESBYOPIA, BILATERAL: ICD-10-CM

## 2023-07-06 DIAGNOSIS — H52.4 MYOPIA WITH ASTIGMATISM AND PRESBYOPIA, BILATERAL: ICD-10-CM

## 2023-07-06 DIAGNOSIS — H52.13 MYOPIA WITH ASTIGMATISM AND PRESBYOPIA, BILATERAL: ICD-10-CM

## 2023-07-06 DIAGNOSIS — L71.9 ROSACEA: ICD-10-CM

## 2023-07-06 DIAGNOSIS — L71.8 OCULAR ROSACEA: ICD-10-CM

## 2023-07-06 PROCEDURE — 99214 PR OFFICE/OUTPT VISIT, EST, LEVL IV, 30-39 MIN: ICD-10-PCS | Mod: S$GLB,,, | Performed by: DERMATOLOGY

## 2023-07-06 PROCEDURE — 1160F PR REVIEW ALL MEDS BY PRESCRIBER/CLIN PHARMACIST DOCUMENTED: ICD-10-PCS | Mod: CPTII,S$GLB,, | Performed by: DERMATOLOGY

## 2023-07-06 PROCEDURE — 92015 DETERMINE REFRACTIVE STATE: CPT | Mod: S$GLB,,, | Performed by: OPTOMETRIST

## 2023-07-06 PROCEDURE — 92014 PR EYE EXAM, EST PATIENT,COMPREHESV: ICD-10-PCS | Mod: S$GLB,,, | Performed by: OPTOMETRIST

## 2023-07-06 PROCEDURE — 92015 PR REFRACTION: ICD-10-PCS | Mod: S$GLB,,, | Performed by: OPTOMETRIST

## 2023-07-06 PROCEDURE — 1160F RVW MEDS BY RX/DR IN RCRD: CPT | Mod: CPTII,S$GLB,, | Performed by: OPTOMETRIST

## 2023-07-06 PROCEDURE — 1159F MED LIST DOCD IN RCRD: CPT | Mod: CPTII,S$GLB,, | Performed by: DERMATOLOGY

## 2023-07-06 PROCEDURE — 99999 PR PBB SHADOW E&M-EST. PATIENT-LVL III: ICD-10-PCS | Mod: PBBFAC,,, | Performed by: DERMATOLOGY

## 2023-07-06 PROCEDURE — 99214 OFFICE O/P EST MOD 30 MIN: CPT | Mod: S$GLB,,, | Performed by: DERMATOLOGY

## 2023-07-06 PROCEDURE — 1159F PR MEDICATION LIST DOCUMENTED IN MEDICAL RECORD: ICD-10-PCS | Mod: CPTII,S$GLB,, | Performed by: DERMATOLOGY

## 2023-07-06 PROCEDURE — 3044F PR MOST RECENT HEMOGLOBIN A1C LEVEL <7.0%: ICD-10-PCS | Mod: CPTII,S$GLB,, | Performed by: OPTOMETRIST

## 2023-07-06 PROCEDURE — 3044F PR MOST RECENT HEMOGLOBIN A1C LEVEL <7.0%: ICD-10-PCS | Mod: CPTII,S$GLB,, | Performed by: DERMATOLOGY

## 2023-07-06 PROCEDURE — 99999 PR PBB SHADOW E&M-EST. PATIENT-LVL III: ICD-10-PCS | Mod: PBBFAC,,, | Performed by: OPTOMETRIST

## 2023-07-06 PROCEDURE — 99999 PR PBB SHADOW E&M-EST. PATIENT-LVL III: CPT | Mod: PBBFAC,,, | Performed by: OPTOMETRIST

## 2023-07-06 PROCEDURE — 1159F PR MEDICATION LIST DOCUMENTED IN MEDICAL RECORD: ICD-10-PCS | Mod: CPTII,S$GLB,, | Performed by: OPTOMETRIST

## 2023-07-06 PROCEDURE — 92014 COMPRE OPH EXAM EST PT 1/>: CPT | Mod: S$GLB,,, | Performed by: OPTOMETRIST

## 2023-07-06 PROCEDURE — 3044F HG A1C LEVEL LT 7.0%: CPT | Mod: CPTII,S$GLB,, | Performed by: DERMATOLOGY

## 2023-07-06 PROCEDURE — 1160F PR REVIEW ALL MEDS BY PRESCRIBER/CLIN PHARMACIST DOCUMENTED: ICD-10-PCS | Mod: CPTII,S$GLB,, | Performed by: OPTOMETRIST

## 2023-07-06 PROCEDURE — 3044F HG A1C LEVEL LT 7.0%: CPT | Mod: CPTII,S$GLB,, | Performed by: OPTOMETRIST

## 2023-07-06 PROCEDURE — 99999 PR PBB SHADOW E&M-EST. PATIENT-LVL III: CPT | Mod: PBBFAC,,, | Performed by: DERMATOLOGY

## 2023-07-06 PROCEDURE — 1160F RVW MEDS BY RX/DR IN RCRD: CPT | Mod: CPTII,S$GLB,, | Performed by: DERMATOLOGY

## 2023-07-06 PROCEDURE — 1159F MED LIST DOCD IN RCRD: CPT | Mod: CPTII,S$GLB,, | Performed by: OPTOMETRIST

## 2023-07-06 RX ORDER — DOXYCYCLINE HYCLATE 50 MG/1
50 TABLET, FILM COATED ORAL DAILY
Qty: 30 TABLET | Refills: 11 | Status: SHIPPED | OUTPATIENT
Start: 2023-07-06

## 2023-07-06 NOTE — PROGRESS NOTES
Subjective:      Patient ID:  Kayla Lowery is a 53 y.o. female who presents for   Chief Complaint   Patient presents with    Rosacea     Last seen 6/16/22 for f/u rosacea, cont'd rhofade qam, metro cream BID, protopic ointment BID prn, doxy 50 mg daily.   Reports overall much better controlled.  Still sometimes has flares, usually with heat or mask wearing.  Tolerating doxy well and would like to continue.    Notes she started plaquenil in May for red swollen joints, no change in face rash control.            Current regimen:  Washing with La Roche Posay wash (from safe list)  Rhofade qam  Metronidazole BID every day  Tacrolimus BID  Neutrogena sunscreen from safe list  Make up from safe list    Notes that products with hyaluronic acid seemed to cause irritation    February 2022 patch test positive for gold sodium thiosulfate, with irritant reaction to imidazolidinyl urea;  Cl +ME - isothiazolinone, and jac mix.     Prior treatments:  Rhofade qam  Soolantra qpm - did not burn, interested in trying again  topical metronidazole- has been years since used  topical clinda - has been years since used  SSS cream samples - reports it burns  Bactrim in the past  minocycline - reports it causes vertigo  oracea samples  Azelaic acid - has been years, interested in trying again  Intermittent short courses of prednisone - help but then flare      Review of Systems    Objective:   Physical Exam   Constitutional: She appears well-developed and well-nourished. No distress.   Neurological: She is alert and oriented to person, place, and time. She is not disoriented.   Psychiatric: She has a normal mood and affect.   Skin:   Areas Examined (abnormalities noted in diagram):   Head / Face Inspection Performed          Diagram Legend     Erythematous scaling macule/papule c/w actinic keratosis       Vascular papule c/w angioma      Pigmented verrucoid papule/plaque c/w seborrheic keratosis      Yellow umbilicated papule c/w  sebaceous hyperplasia      Irregularly shaped tan macule c/w lentigo     1-2 mm smooth white papules consistent with Milia      Movable subcutaneous cyst with punctum c/w epidermal inclusion cyst      Subcutaneous movable cyst c/w pilar cyst      Firm pink to brown papule c/w dermatofibroma      Pedunculated fleshy papule(s) c/w skin tag(s)      Evenly pigmented macule c/w junctional nevus     Mildly variegated pigmented, slightly irregular-bordered macule c/w mildly atypical nevus      Flesh colored to evenly pigmented papule c/w intradermal nevus       Pink pearly papule/plaque c/w basal cell carcinoma      Erythematous hyperkeratotic cursted plaque c/w SCC      Surgical scar with no sign of skin cancer recurrence      Open and closed comedones      Inflammatory papules and pustules      Verrucoid papule consistent consistent with wart     Erythematous eczematous patches and plaques     Dystrophic onycholytic nail with subungual debris c/w onychomycosis     Umbilicated papule    Erythematous-base heme-crusted tan verrucoid plaque consistent with inflamed seborrheic keratosis     Erythematous Silvery Scaling Plaque c/w Psoriasis     See annotation      Assessment / Plan:           Rosacea  - SC, currently controlled.   - continue current regimen: rhofade qam, metro cream BID, protopic ointment BID, doxy 50 mg daily  - consider Seysara, low dose isotretinoin, laser  - continue avoiding allergens; use safe list products (sent updated [facility] list); daily SPF     - doxycycline: Side effect profile of doxy reviewed including increased sun sensitivity and upset stomach, esophageal inflammation, effect on gut health and potential for developing antibiotic resistance.  Patient was instructed to take with food and water and to avoid lying down for 1 hour after taking. Patient was instructed to not become pregnant while on medication to effects on dental development in fetus; she acknowledged understanding of risks involved.  "           Allergic contact dermatitis- gold sodium thiosulfate  - and irritant reactions noted on initial read to imidazolidinyl urea;  Cl +ME - isothiazolinone, and jac mix  - continue avoiding allergens and continue using "safe list"           RTC 1 year        LOS NUMBER AND COMPLEXITY OF PROBLEMS    COMPLEXITY OF DATA RISK TOTAL TIME (m)   52840  56936 [] 1 self-limited or minor problem [x] Minimal to none [] No treatment recommended or patient to monitor. Reassurance.  15-29  10-19   60690  15777 Low  [] 2 or more self limited or minor problems  [] 1 stable chronic illness  [] 1 acute, uncomplicated illness or injury Limited (2)  [] Prior external notes from each unique source  [] Review result of each unique test  [] Order each unique test  OR [] Independent historian Low  []  OTC medications   []  Discussed/Decision for minor skin surgery (no risk factors) 30-44  20-29   37593  26861 Moderate  []  1 or more chronic unstable illness (not at goal or progression or exacerbation) or SE of treatment  [x]  2 or more stable chronic illnesses  []  1 acute illness with systemic symptoms  []  1 acute complicated injury  []  1 undiagnosed new problem with uncertain prognosis Moderate (1/3 below)  []  3 or more data items        *Now includes independent historian  []  Independent interpretation of a test  []  Discuss management/test with another provider Moderate  [x]  Prescription drug mgmt  []  Discussed/Decision for Minor surgery with risk factors  []  Mgmt limited by social determinates 45-59  30-39   69725  09998 High  []  1 or more chronic illness with severe exacerbation, progression or SE of treatment  []  1 acute or chronic illness/injury that poses a threat to life or bodily function Extensive (2/3 below)  []  3 or more data items        *Now includes independent historian.  []  Independent interpretation of a test  []  Discuss management/test with another provider High  []  Major surgery with risk " discussed  []  Drug therapy requiring intensive monitoring for toxicity  []  Hospitalization  []  Decision for DNR 60-27  40-54

## 2023-07-06 NOTE — PROGRESS NOTES
HPI     Annual Exam            Comments:   Vision changes since last eye exam?: no  Any eye pain today:dry   Other ocular symptoms: no  Interested in contact lens fitting today? no           Last edited by Cesilia Calwdell MA on 7/6/2023  3:36 PM.            Assessment /Plan     For exam results, see Encounter Report.    Dry eye  Ocular rosacea  No active disease at this time  Continue Refresh  Continue doxy po    Myopia with astigmatism and presbyopia, bilateral  Eyeglass Final Rx       Eyeglass Final Rx         Sphere Cylinder Axis    Right -0.50 +0.50 025    Left -2.25 +0.25 160      Type: SVL distance    Expiration Date: 7/6/2024              Eyeglass Final Rx #2         Sphere Cylinder Axis    Right +0.50 +0.50 025    Left -1.25 +0.25 160      Type: SVL near    Expiration Date: 7/6/2024    Comments: Signs are correct                  Normal gOCT today with nice, symmetric GCL OU      RTC 1 yr for dilated eye exam or PRN if any problems.   Discussed above and answered questions.

## 2023-07-06 NOTE — TELEPHONE ENCOUNTER
----- Message from Jill Saucedo sent at 7/6/2023  8:45 AM CDT -----  Contact: Yomi  .Type:  RX Refill Request    Who Called: Yomi   Refill or New Rx:refill   RX Name and Strength:XYWAV 0.5 gram/mL Soln  How is the patient currently taking it? (ex. 1XDay):as prescribed   Is this a 30 day or 90 day RX:30 days   Preferred Pharmacy with phone number:.  [address] [address]  Phone: [phone] Fax: [phone]  Local or Mail Order:mail   Ordering Provider:Dr. Rachel   Would the patient rather a call back or a response via [facility]? Call   Best Call Back Number:.  [phone] (home)   Additional Information: Patient is out of medication

## 2023-07-10 ENCOUNTER — PATIENT MESSAGE (OUTPATIENT)
Dept: SLEEP MEDICINE | Facility: CLINIC | Age: 53
End: 2023-07-10
Payer: COMMERCIAL

## 2023-07-10 DIAGNOSIS — L71.9 ROSACEA: ICD-10-CM

## 2023-07-11 ENCOUNTER — TELEPHONE (OUTPATIENT)
Dept: PULMONOLOGY | Facility: CLINIC | Age: 53
End: 2023-07-11
Payer: COMMERCIAL

## 2023-07-11 ENCOUNTER — PATIENT MESSAGE (OUTPATIENT)
Dept: DERMATOLOGY | Facility: CLINIC | Age: 53
End: 2023-07-11
Payer: COMMERCIAL

## 2023-07-11 NOTE — TELEPHONE ENCOUNTER
----- Message from Radha Kong sent at 7/11/2023  1:09 PM CDT -----  Contact: Alyce/[facility]  Alyce is calling in regards to needing prescription clarification regarding XYWAV 0.5 gram/mL Soln. Prescription was originally sent for 3.75 g twice nightly, was supposed to be 3.75 g for first dose and 3 g for 2nd dose. Please call back at [phone] option 3, then option 4 fax [phone]               Thanks  PJ

## 2023-07-17 DIAGNOSIS — G47.419 PRIMARY NARCOLEPSY WITHOUT CATAPLEXY: ICD-10-CM

## 2023-07-17 RX ORDER — METHYLPHENIDATE HYDROCHLORIDE 10 MG/1
10 TABLET ORAL 2 TIMES DAILY
Qty: 120 TABLET | Refills: 0 | Status: SHIPPED | OUTPATIENT
Start: 2023-07-17 | End: 2023-08-01 | Stop reason: SDUPTHER

## 2023-07-19 ENCOUNTER — PATIENT MESSAGE (OUTPATIENT)
Dept: SLEEP MEDICINE | Facility: CLINIC | Age: 53
End: 2023-07-19
Payer: COMMERCIAL

## 2023-08-01 ENCOUNTER — PATIENT MESSAGE (OUTPATIENT)
Dept: ADMINISTRATIVE | Facility: OTHER | Age: 53
End: 2023-08-01
Payer: COMMERCIAL

## 2023-08-01 ENCOUNTER — LAB VISIT (OUTPATIENT)
Dept: LAB | Facility: HOSPITAL | Age: 53
End: 2023-08-01
Attending: INTERNAL MEDICINE
Payer: COMMERCIAL

## 2023-08-01 ENCOUNTER — PATIENT MESSAGE (OUTPATIENT)
Dept: SLEEP MEDICINE | Facility: CLINIC | Age: 53
End: 2023-08-01
Payer: COMMERCIAL

## 2023-08-01 DIAGNOSIS — M19.90 INFLAMMATORY ARTHRITIS: ICD-10-CM

## 2023-08-01 DIAGNOSIS — M79.89 FINGER SWELLING: ICD-10-CM

## 2023-08-01 DIAGNOSIS — G47.419 PRIMARY NARCOLEPSY WITHOUT CATAPLEXY: ICD-10-CM

## 2023-08-01 LAB
ALBUMIN SERPL BCP-MCNC: 4.2 G/DL (ref 3.5–5.2)
ALP SERPL-CCNC: 53 U/L (ref 55–135)
ALT SERPL W/O P-5'-P-CCNC: 18 U/L (ref 10–44)
ANION GAP SERPL CALC-SCNC: 9 MMOL/L (ref 8–16)
AST SERPL-CCNC: 21 U/L (ref 10–40)
BASOPHILS # BLD AUTO: 0.04 K/UL (ref 0–0.2)
BASOPHILS NFR BLD: 0.7 % (ref 0–1.9)
BILIRUB SERPL-MCNC: 0.7 MG/DL (ref 0.1–1)
BUN SERPL-MCNC: 20 MG/DL (ref 6–20)
CALCIUM SERPL-MCNC: 9.6 MG/DL (ref 8.7–10.5)
CHLORIDE SERPL-SCNC: 102 MMOL/L (ref 95–110)
CO2 SERPL-SCNC: 26 MMOL/L (ref 23–29)
CREAT SERPL-MCNC: 0.8 MG/DL (ref 0.5–1.4)
DIFFERENTIAL METHOD: ABNORMAL
EOSINOPHIL # BLD AUTO: 0.1 K/UL (ref 0–0.5)
EOSINOPHIL NFR BLD: 2.2 % (ref 0–8)
ERYTHROCYTE [DISTWIDTH] IN BLOOD BY AUTOMATED COUNT: 11.9 % (ref 11.5–14.5)
EST. GFR  (NO RACE VARIABLE): >60 ML/MIN/1.73 M^2
GLUCOSE SERPL-MCNC: 89 MG/DL (ref 70–110)
HCT VFR BLD AUTO: 41.2 % (ref 37–48.5)
HGB BLD-MCNC: 14.3 G/DL (ref 12–16)
IMM GRANULOCYTES # BLD AUTO: 0.01 K/UL (ref 0–0.04)
IMM GRANULOCYTES NFR BLD AUTO: 0.2 % (ref 0–0.5)
LYMPHOCYTES # BLD AUTO: 1.4 K/UL (ref 1–4.8)
LYMPHOCYTES NFR BLD: 23.8 % (ref 18–48)
MCH RBC QN AUTO: 31.2 PG (ref 27–31)
MCHC RBC AUTO-ENTMCNC: 34.7 G/DL (ref 32–36)
MCV RBC AUTO: 90 FL (ref 82–98)
MONOCYTES # BLD AUTO: 0.4 K/UL (ref 0.3–1)
MONOCYTES NFR BLD: 6.7 % (ref 4–15)
NEUTROPHILS # BLD AUTO: 3.8 K/UL (ref 1.8–7.7)
NEUTROPHILS NFR BLD: 66.4 % (ref 38–73)
NRBC BLD-RTO: 0 /100 WBC
PLATELET # BLD AUTO: 261 K/UL (ref 150–450)
PMV BLD AUTO: 9.2 FL (ref 9.2–12.9)
POTASSIUM SERPL-SCNC: 4.6 MMOL/L (ref 3.5–5.1)
PROT SERPL-MCNC: 7 G/DL (ref 6–8.4)
RBC # BLD AUTO: 4.59 M/UL (ref 4–5.4)
SODIUM SERPL-SCNC: 137 MMOL/L (ref 136–145)
WBC # BLD AUTO: 5.79 K/UL (ref 3.9–12.7)

## 2023-08-01 PROCEDURE — 85025 COMPLETE CBC W/AUTO DIFF WBC: CPT | Performed by: INTERNAL MEDICINE

## 2023-08-01 PROCEDURE — 36415 COLL VENOUS BLD VENIPUNCTURE: CPT | Performed by: INTERNAL MEDICINE

## 2023-08-01 PROCEDURE — 80053 COMPREHEN METABOLIC PANEL: CPT | Performed by: INTERNAL MEDICINE

## 2023-08-01 RX ORDER — METHYLPHENIDATE HYDROCHLORIDE 10 MG/1
10 TABLET ORAL 2 TIMES DAILY
Qty: 60 TABLET | Refills: 0 | Status: SHIPPED | OUTPATIENT
Start: 2023-08-01 | End: 2023-09-07 | Stop reason: SDUPTHER

## 2023-08-09 ENCOUNTER — PATIENT MESSAGE (OUTPATIENT)
Dept: RHEUMATOLOGY | Facility: CLINIC | Age: 53
End: 2023-08-09
Payer: COMMERCIAL

## 2023-08-10 ENCOUNTER — OFFICE VISIT (OUTPATIENT)
Dept: RHEUMATOLOGY | Facility: CLINIC | Age: 53
End: 2023-08-10
Payer: COMMERCIAL

## 2023-08-10 DIAGNOSIS — M13.80 SERONEGATIVE ARTHRITIS: Primary | ICD-10-CM

## 2023-08-10 DIAGNOSIS — L71.9 ROSACEA: ICD-10-CM

## 2023-08-10 DIAGNOSIS — F98.8 ATTENTION DEFICIT DISORDER, UNSPECIFIED HYPERACTIVITY PRESENCE: ICD-10-CM

## 2023-08-10 DIAGNOSIS — M15.9 PRIMARY OSTEOARTHRITIS INVOLVING MULTIPLE JOINTS: ICD-10-CM

## 2023-08-10 DIAGNOSIS — G43.809 OTHER MIGRAINE WITHOUT STATUS MIGRAINOSUS, NOT INTRACTABLE: ICD-10-CM

## 2023-08-10 DIAGNOSIS — M79.89 FINGER SWELLING: ICD-10-CM

## 2023-08-10 DIAGNOSIS — F41.9 ANXIETY: ICD-10-CM

## 2023-08-10 DIAGNOSIS — E50.7 XEROPHTHALMIA: ICD-10-CM

## 2023-08-10 PROCEDURE — 3044F HG A1C LEVEL LT 7.0%: CPT | Mod: CPTII,95,, | Performed by: INTERNAL MEDICINE

## 2023-08-10 PROCEDURE — 99214 PR OFFICE/OUTPT VISIT, EST, LEVL IV, 30-39 MIN: ICD-10-PCS | Mod: 95,,, | Performed by: INTERNAL MEDICINE

## 2023-08-10 PROCEDURE — 3044F PR MOST RECENT HEMOGLOBIN A1C LEVEL <7.0%: ICD-10-PCS | Mod: CPTII,95,, | Performed by: INTERNAL MEDICINE

## 2023-08-10 PROCEDURE — 99214 OFFICE O/P EST MOD 30 MIN: CPT | Mod: 95,,, | Performed by: INTERNAL MEDICINE

## 2023-08-10 NOTE — PROGRESS NOTES
The patient location is: [place]  The chief complaint leading to consultation is: RA    Visit type: audiovisual    Face to Face time with patient: 20  30 minutes of total time spent on the encounter, which includes face to face time and non-face to face time preparing to see the patient (eg, review of tests), Obtaining and/or reviewing separately obtained history, Documenting clinical information in the electronic or other health record, Independently interpreting results (not separately reported) and communicating results to the patient/family/caregiver, or Care coordination (not separately reported).         Each patient to whom he or she provides medical services by telemedicine is:  (1) informed of the relationship between the physician and patient and the respective role of any other health care provider with respect to management of the patient; and (2) notified that he or she may decline to receive medical services by telemedicine and may withdraw from such care at any time.       RHEUMATOLOGY OUTPATIENT CLINIC NOTE    8/10/2023    Attending Rheumatologist: Jose Angel Glover  Primary Care Provider/Physician Requesting Consultation: Cali Patel MD   Chief Complaint/Reason For Consultation:  No chief complaint on file.      Subjective:     Kayla Lowery is a 53 y.o. White female with SNRA for f/u    Adequate response to HCQ. Denies refractory finger swelling.  Residual arthralgias w/ mechanical pattern    Review of Systems   Constitutional:  Negative for fever.   Eyes:  Negative for photophobia and pain.   Respiratory:  Negative for cough and shortness of breath.    Cardiovascular:  Negative for chest pain.   Gastrointestinal:  Negative for blood in stool and melena.   Genitourinary:  Negative for hematuria.   Musculoskeletal:  Positive for joint pain.   Skin:  Negative for rash.   Neurological:  Negative for headaches.       Chronic comorbid conditions affecting medical decision making today:  Past Medical  History:   Diagnosis Date    ADHD (attention deficit hyperactivity disorder)     Anxiety     History of abnormal cervical Pap smear 2012    Hypertension     Migraines     Narcolepsy     Rosacea      No past surgical history on file.  Family History   Problem Relation Age of Onset    Hypertension Mother     Heart disease Mother     Parkinsonism Mother     Coronary artery disease Mother     Diabetes Mother     Hyperlipidemia Mother     Depression Mother     Hearing loss Mother     Alcohol abuse Father         in remission    Drug abuse Father         in remission    Bladder Cancer Maternal Grandfather     Heart disease Maternal Grandfather     Hypertension Paternal Grandfather     Heart disease Paternal Grandfather     Prostate cancer Paternal Grandfather     Macular degeneration Maternal Aunt     Arthritis Sister     Depression Sister     Stroke Sister     Arthritis Sister     Depression Sister     Hypertension Sister     Migraines Sister     Stroke Sister     Asthma Son     Learning disabilities Son     Diabetes Maternal Aunt     Hyperlipidemia Maternal Aunt      Social History     Tobacco Use   Smoking Status Never   Smokeless Tobacco Never       Current Outpatient Medications:     cetirizine (ZYRTEC) 10 MG tablet, Take 10 mg by mouth once daily., Disp: , Rfl:     doxycycline hyclate 50 mg Tab, Take 1 tablet (50 mg) by mouth once daily. Take 1 by mouth everyday with food and water. Avoid lying down for 1 hour after taking. Avoid the sun., Disp: 30 tablet, Rfl: 11    estradiol-progesterone (BIJUVA) 1-100 mg Cap, Take 1 tablet by mouth once daily., Disp: 30 capsule, Rfl: 11    FLUoxetine 20 MG tablet, Take 1 tablet (20 mg total) by mouth once daily., Disp: 90 tablet, Rfl: 3    OPW TEST CLAIM - DO NOT FILL, OPW test claim. Do not fill., Disp: 30 each, Rfl: 0    methylphenidate HCl (RITALIN) 10 MG tablet, Take 1 tablet (10 mg total) by mouth 2 (two) times daily., Disp: 60 tablet, Rfl: 0    methylphenidate HCl 18 MG CR  tablet, Take 1 tablet (18 mg total) by mouth every morning., Disp: 90 tablet, Rfl: 0    metronidazole 0.75% (METROCREAM) 0.75 % Crea, Apply topically 2 (two) times daily., Disp: 45 g, Rfl: 3    ondansetron (ZOFRAN) 8 MG tablet, Take 1 tablet (8 mg total) by mouth every 8 (eight) hours as needed., Disp: 30 tablet, Rfl: 0    oxymetazoline (RHOFADE) 1 % Crea, Apply daily to affected area, Disp: 30 g, Rfl: 11    propranoloL (INDERAL) 10 MG tablet, Take 1 tablet (10 mg total) by mouth 2 (two) times daily as needed., Disp: 90 tablet, Rfl: 2    rizatriptan (MAXALT-MLT) 10 MG disintegrating tablet, Take 1 tablet (10 mg total) by mouth as needed for Migraine., Disp: 9 tablet, Rfl: 1    tacrolimus (PROTOPIC) 0.1 % ointment, Apply topically 2 (two) times daily., Disp: 60 g, Rfl: 2    valACYclovir (VALTREX) 1000 MG tablet, Take 2 tablets by mouth twice daily for 2 days as needed first signs of outbreak, Disp: 30 tablet, Rfl: 3    XYWAV 0.5 gram/mL Soln, Take 3.75 g by mouth 2 (two) times a day. 1st dose: @ 8:30 pm, 3 g @ 12 MN, Disp: , Rfl:      Objective:     There were no vitals filed for this visit.  Physical Exam   Eyes: Conjunctivae are normal.   Pulmonary/Chest: Effort normal. No respiratory distress.   Musculoskeletal:         General: No swelling or tenderness. Normal range of motion.   Neurological: She displays no weakness.   Skin: No rash noted.       Reviewed available old and all outside pertinent medical records available.    All lab results personally reviewed and interpreted by me.       ASSESSMENT      Encounter Diagnoses   Name Primary?    Primary osteoarthritis involving multiple joints     Xerophthalmia     Seronegative arthritis Yes    Attention deficit disorder, unspecified hyperactivity presence     Finger swelling     Rosacea     Other migraine without status migrainosus, not intractable     Anxiety       PLAN     CDAI; low disease activity.  Excellent response to steroid taper and HCQ as DMARD.  Denies  recurrent finger swelling.  Intermittent refractory arthralgias w/ mechanical pattern from oa.  Some PIPj tender upon finger flexion, no swelling noted.  Absence of inflammatory rashes or weakness on exam.  Negative rheumatic w/u: kenney negative. RA serologies negative.  UrA at goal.  HLAB27 negative.  XR reported w/o marginal erosive change or ankylosis.  Plan to c/w HCQ for 1 year then DC.  Will call in PDN if refractory IA sx not improving w/ short course NSAIDs OTC.  Repeat labs close to f/u.    Jose Angel Glover M.D.

## 2023-08-14 DIAGNOSIS — G47.419 PRIMARY NARCOLEPSY WITHOUT CATAPLEXY: ICD-10-CM

## 2023-08-14 RX ORDER — PROPRANOLOL HYDROCHLORIDE 10 MG/1
10 TABLET ORAL 2 TIMES DAILY PRN
Qty: 90 TABLET | Refills: 1 | Status: SHIPPED | OUTPATIENT
Start: 2023-08-14 | End: 2023-12-02 | Stop reason: SDUPTHER

## 2023-08-14 NOTE — TELEPHONE ENCOUNTER
No care due was identified.  Health [facility] Embedded Care Due Messages. Reference number: [id number].   8/14/2023 6:15:25 AM CDT

## 2023-08-14 NOTE — TELEPHONE ENCOUNTER
Refill Routing Note   Medication(s) are not appropriate for processing by [facility] for the following reason(s):      Required vitals abnormal: BP (!) 146/92    [facility] action(s):  Defer Care Due:  None identified          Appointments  past 12m or future 3m with PCP    Date Provider   Last Visit   1/5/2023 Cali Patel MD   Next Visit   Visit date not found Cali Patel MD   ED visits in past 90 days: 0        Note composed:12:15 PM 08/14/2023

## 2023-08-16 ENCOUNTER — PATIENT MESSAGE (OUTPATIENT)
Dept: RHEUMATOLOGY | Facility: CLINIC | Age: 53
End: 2023-08-16
Payer: COMMERCIAL

## 2023-08-16 ENCOUNTER — PATIENT MESSAGE (OUTPATIENT)
Dept: SLEEP MEDICINE | Facility: CLINIC | Age: 53
End: 2023-08-16
Payer: COMMERCIAL

## 2023-08-16 DIAGNOSIS — M79.89 FINGER SWELLING: ICD-10-CM

## 2023-08-16 DIAGNOSIS — M19.90 INFLAMMATORY ARTHRITIS: ICD-10-CM

## 2023-08-16 RX ORDER — METHYLPHENIDATE HYDROCHLORIDE 18 MG/1
18 TABLET ORAL EVERY MORNING
Qty: 90 TABLET | Refills: 0 | Status: SHIPPED | OUTPATIENT
Start: 2023-08-16 | End: 2023-10-09 | Stop reason: SDUPTHER

## 2023-08-16 RX ORDER — HYDROXYCHLOROQUINE SULFATE 200 MG/1
200 TABLET, FILM COATED ORAL DAILY
Qty: 90 TABLET | Refills: 1 | Status: SHIPPED | OUTPATIENT
Start: 2023-08-16 | End: 2024-01-18

## 2023-08-17 ENCOUNTER — PATIENT MESSAGE (OUTPATIENT)
Dept: SLEEP MEDICINE | Facility: CLINIC | Age: 53
End: 2023-08-17
Payer: COMMERCIAL

## 2023-09-06 ENCOUNTER — PATIENT MESSAGE (OUTPATIENT)
Dept: SLEEP MEDICINE | Facility: CLINIC | Age: 53
End: 2023-09-06
Payer: COMMERCIAL

## 2023-09-07 DIAGNOSIS — G47.419 PRIMARY NARCOLEPSY WITHOUT CATAPLEXY: ICD-10-CM

## 2023-09-07 RX ORDER — METHYLPHENIDATE HYDROCHLORIDE 10 MG/1
10 TABLET ORAL 2 TIMES DAILY
Qty: 60 TABLET | Refills: 0 | Status: SHIPPED | OUTPATIENT
Start: 2023-09-07 | End: 2023-10-09 | Stop reason: SDUPTHER

## 2023-09-26 ENCOUNTER — PATIENT MESSAGE (OUTPATIENT)
Dept: INTERNAL MEDICINE | Facility: CLINIC | Age: 53
End: 2023-09-26
Payer: COMMERCIAL

## 2023-09-26 RX ORDER — ONDANSETRON HYDROCHLORIDE 8 MG/1
8 TABLET, FILM COATED ORAL EVERY 8 HOURS PRN
Qty: 30 TABLET | Refills: 0 | Status: SHIPPED | OUTPATIENT
Start: 2023-09-26

## 2023-09-26 RX ORDER — OMEPRAZOLE 40 MG/1
40 CAPSULE, DELAYED RELEASE ORAL DAILY
Qty: 90 CAPSULE | Refills: 0 | Status: SHIPPED | OUTPATIENT
Start: 2023-09-26 | End: 2024-01-01 | Stop reason: SDUPTHER

## 2023-09-26 RX ORDER — OMEPRAZOLE 40 MG/1
40 CAPSULE, DELAYED RELEASE ORAL DAILY
Qty: 90 CAPSULE | Refills: 0 | Status: SHIPPED | OUTPATIENT
Start: 2023-09-26 | End: 2023-09-26

## 2023-09-26 RX ORDER — OMEPRAZOLE 40 MG/1
40 CAPSULE, DELAYED RELEASE ORAL DAILY
Qty: 90 CAPSULE | Refills: 0 | Status: SHIPPED | OUTPATIENT
Start: 2023-09-26 | End: 2023-09-26 | Stop reason: SDUPTHER

## 2023-09-26 NOTE — TELEPHONE ENCOUNTER
No care due was identified.  Health [facility] Embedded Care Due Messages. Reference number: [id number].   9/26/2023 5:54:35 AM CDT

## 2023-10-05 DIAGNOSIS — G47.419 PRIMARY NARCOLEPSY WITHOUT CATAPLEXY: ICD-10-CM

## 2023-10-05 RX ORDER — METHYLPHENIDATE HYDROCHLORIDE 10 MG/1
10 TABLET ORAL 2 TIMES DAILY
Qty: 60 TABLET | Refills: 0 | Status: CANCELLED | OUTPATIENT
Start: 2023-10-05 | End: 2023-12-04

## 2023-10-09 ENCOUNTER — PATIENT MESSAGE (OUTPATIENT)
Dept: SLEEP MEDICINE | Facility: CLINIC | Age: 53
End: 2023-10-09
Payer: COMMERCIAL

## 2023-10-09 DIAGNOSIS — G47.419 PRIMARY NARCOLEPSY WITHOUT CATAPLEXY: ICD-10-CM

## 2023-10-09 RX ORDER — METHYLPHENIDATE HYDROCHLORIDE 10 MG/1
10 TABLET ORAL 2 TIMES DAILY
Qty: 60 TABLET | Refills: 0 | Status: SHIPPED | OUTPATIENT
Start: 2023-10-09 | End: 2023-11-07 | Stop reason: SDUPTHER

## 2023-10-09 RX ORDER — METHYLPHENIDATE HYDROCHLORIDE 18 MG/1
18 TABLET ORAL EVERY MORNING
Qty: 90 TABLET | Refills: 0 | Status: SHIPPED | OUTPATIENT
Start: 2023-10-09 | End: 2023-11-10 | Stop reason: SDUPTHER

## 2023-10-10 ENCOUNTER — PATIENT MESSAGE (OUTPATIENT)
Dept: SLEEP MEDICINE | Facility: CLINIC | Age: 53
End: 2023-10-10
Payer: COMMERCIAL

## 2023-10-24 ENCOUNTER — PATIENT MESSAGE (OUTPATIENT)
Dept: DERMATOLOGY | Facility: CLINIC | Age: 53
End: 2023-10-24
Payer: COMMERCIAL

## 2023-11-07 DIAGNOSIS — G47.419 PRIMARY NARCOLEPSY WITHOUT CATAPLEXY: ICD-10-CM

## 2023-11-07 RX ORDER — METHYLPHENIDATE HYDROCHLORIDE 10 MG/1
10 TABLET ORAL 2 TIMES DAILY
Qty: 60 TABLET | Refills: 0 | Status: SHIPPED | OUTPATIENT
Start: 2023-11-07 | End: 2023-12-06 | Stop reason: SDUPTHER

## 2023-11-09 ENCOUNTER — OFFICE VISIT (OUTPATIENT)
Dept: SLEEP MEDICINE | Facility: CLINIC | Age: 53
End: 2023-11-09
Payer: COMMERCIAL

## 2023-11-09 VITALS — WEIGHT: 100 LBS | HEIGHT: 62 IN | BODY MASS INDEX: 18.4 KG/M2

## 2023-11-09 DIAGNOSIS — G47.419 PRIMARY NARCOLEPSY WITHOUT CATAPLEXY: ICD-10-CM

## 2023-11-09 DIAGNOSIS — I10 PRIMARY HYPERTENSION: Primary | ICD-10-CM

## 2023-11-09 PROCEDURE — 1160F PR REVIEW ALL MEDS BY PRESCRIBER/CLIN PHARMACIST DOCUMENTED: ICD-10-PCS | Mod: CPTII,95,, | Performed by: INTERNAL MEDICINE

## 2023-11-09 PROCEDURE — 99213 PR OFFICE/OUTPT VISIT, EST, LEVL III, 20-29 MIN: ICD-10-PCS | Mod: 95,,, | Performed by: INTERNAL MEDICINE

## 2023-11-09 PROCEDURE — 3008F BODY MASS INDEX DOCD: CPT | Mod: CPTII,95,, | Performed by: INTERNAL MEDICINE

## 2023-11-09 PROCEDURE — 1159F PR MEDICATION LIST DOCUMENTED IN MEDICAL RECORD: ICD-10-PCS | Mod: CPTII,95,, | Performed by: INTERNAL MEDICINE

## 2023-11-09 PROCEDURE — 3044F HG A1C LEVEL LT 7.0%: CPT | Mod: CPTII,95,, | Performed by: INTERNAL MEDICINE

## 2023-11-09 PROCEDURE — 1159F MED LIST DOCD IN RCRD: CPT | Mod: CPTII,95,, | Performed by: INTERNAL MEDICINE

## 2023-11-09 PROCEDURE — 1160F RVW MEDS BY RX/DR IN RCRD: CPT | Mod: CPTII,95,, | Performed by: INTERNAL MEDICINE

## 2023-11-09 PROCEDURE — 3044F PR MOST RECENT HEMOGLOBIN A1C LEVEL <7.0%: ICD-10-PCS | Mod: CPTII,95,, | Performed by: INTERNAL MEDICINE

## 2023-11-09 PROCEDURE — 3008F PR BODY MASS INDEX (BMI) DOCUMENTED: ICD-10-PCS | Mod: CPTII,95,, | Performed by: INTERNAL MEDICINE

## 2023-11-09 PROCEDURE — 99213 OFFICE O/P EST LOW 20 MIN: CPT | Mod: 95,,, | Performed by: INTERNAL MEDICINE

## 2023-11-09 NOTE — ASSESSMENT & PLAN NOTE
[place]: 8  Night : XYWAV 3.73 gm and 3 gm  Day time : CONCERTA, methyphenodate 18 mg on AM and 10 mg in PM  No medication side effects  Tolerating well

## 2023-11-09 NOTE — PROGRESS NOTES
The patient location is: [facility]  The chief complaint leading to consultation is: Narcolepsy    Visit type: audiovisual    Face to Face time with patient: 5 min  30 minutes of total time spent on the encounter, which includes face to face time and non-face to face time preparing to see the patient (eg, review of tests), Obtaining and/or reviewing separately obtained history, Documenting clinical information in the electronic or other health record, Independently interpreting results (not separately reported) and communicating results to the patient/family/caregiver, or Care coordination (not separately reported).         Each patient to whom he or she provides medical services by telemedicine is:  (1) informed of the relationship between the physician and patient and the respective role of any other health care provider with respect to management of the patient; and (2) notified that he or she may decline to receive medical services by telemedicine and may withdraw from such care at any time.    Notes:                                             Pulmonary Outpatient  Visit     Subjective:       Patient ID: Kayla Lowery is a 53 y.o. female.    Chief Complaint: narcolepsy        Kayla Lowery is 52 y.o.  Physician: [facility]  [facility]  Xywav and methylphenidate  Sleep study was done in 2006:   Displaced here after [place]  Likely had narcolepsy since 14 years, Had ADHD  Since will fall asleep during tests  Seen psychologist and neurologist  Bed time: 9-0930pm  Wake time: 0430 am  SOL 15 mins  No problems with Meds  Notes reviewed  Had tremor  Sleep paralysis and hallucination Last event  2014    Last event: cannot remember  No MVA     Notes reveiwed  1. Primary narcolepsy without cataplexy     PLAN    Kayla was seen today for narcolepsy without cataplexy.    Diagnoses and all orders for this visit:    Primary narcolepsy without cataplexy    1.  Do Not Drive if you are sleepy!  2. Will continue xywav at 3.75g and 3 grams at night.Will change the dosing with new rx.   3. Continue methylphenidate 18 mg po daily. And ritalin 10 mg BID Prn.    Electronically signed by Rodo Wilson MD at 09/14/2021 1:42 PM CDT          08/11/2022  Last 04/26/2022  Doing well on Xywav 1 st dose 3.75gm and 2nd dose 3 gm  Wake up 05:30 am  Doing well in day time   Takes concerta in AM  Lasts 6 hrs  methyphenolate x 2 in PM  Inderal for tachy and tremors    12/08/2022  Last visit 08/11/2022  Doing well on Xywav 1 st dose 3.75gm and 2nd dose 3 gm  Wake up 05:30 am  Doing well in day time   Takes concerta in AM  Lasts 6 hrs  methyphenolate x 2 in PM  Inderal for tachy and tremors  Recent left hand #  Consider BRAND CONCERTA  Asked about WAKIX: will interact with [facility]    05/11/2023  Fopllow up   Narcolepsy  [place]  Doing well on Xywav 1 st dose ( 09:30 pm)  3.75gm and 2nd dose 3 gm ( 1 am)  Wake up 05:30 am  Doing well in day time   No side effect  Propranolol for tremor  Takes concerta in AM  Lasts 6 hrs  methyphenolate x 2 in PM  Epworth7  CBC and TSH reveiwed  Recently started Plaquenil    11/09/2023   Follow-up appointment  Narcolepsy epworth score  Doing well on meds:  Trouble with weight: /84,  Pulse 73  XYWAV: Doing well on Xywav 1 st dose ( 09:30 pm)  3.75gm and 2nd dose 3 gm ( 1 am)  No DTS: some methylphenidate 10 mg and 18 mg             11/9/2023     1:15 PM   EPWORTH SLEEPINESS SCALE   Sitting and reading 1   Watching TV 1   Sitting, inactive in a public place (e.g. a theatre or a meeting) 1   As a passenger in a car for an hour without a break 2   Lying down to rest in the afternoon when circumstances permit 2   Sitting and talking to someone 0   Sitting quietly after a lunch without alcohol 1   In a car, while stopped for a few minutes in traffic 0   Total score 8          The following portions of the patient's history were reviewed and updated as  appropriate:   She  has a past medical history of ADHD (attention deficit hyperactivity disorder), Anxiety, History of abnormal cervical Pap smear (2012), Hypertension, Migraines, Narcolepsy, and Rosacea.  She does not have any pertinent problems on file.  She  has no past surgical history on file.  Her family history includes Alcohol abuse in her father; Arthritis in her sister and sister; Asthma in her son; Bladder Cancer in her maternal grandfather; Coronary artery disease in her mother; Depression in her mother, sister, and sister; Diabetes in her maternal aunt and mother; Drug abuse in her father; Hearing loss in her mother; Heart disease in her maternal grandfather, mother, and paternal grandfather; Hyperlipidemia in her maternal aunt and mother; Hypertension in her mother, paternal grandfather, and sister; Learning disabilities in her son; Macular degeneration in her maternal aunt; Migraines in her sister; Parkinsonism in her mother; Prostate cancer in her paternal grandfather; Stroke in her sister and sister.  She  reports that she has never smoked. She has never used smokeless tobacco. She reports that she does not currently use alcohol after a past usage of about 1.0 standard drink of alcohol per week. She reports that she does not use drugs.  She has a current medication list which includes the following prescription(s): doxycycline hyclate, bijuva, fluoxetine, hydroxychloroquine, methylphenidate hcl, methylphenidate hcl, metronidazole 0.75%, omeprazole, ondansetron, propranolol, rizatriptan, tacrolimus, valacyclovir, and xywav.  Current Outpatient Medications on File Prior to Visit   Medication Sig Dispense Refill    doxycycline hyclate 50 mg Tab Take 1 tablet (50 mg) by mouth once daily. Take 1 by mouth everyday with food and water. Avoid lying down for 1 hour after taking. Avoid the sun. 30 tablet 11    estradiol-progesterone (BIJUVA) 1-100 mg Cap Take 1 tablet by mouth once daily. 30 capsule 11     FLUoxetine 20 MG tablet Take 1 tablet (20 mg total) by mouth once daily. 90 tablet 3    hydrOXYchloroQUINE (PLAQUENIL) 200 mg tablet Take 1 tablet (200 mg total) by mouth once daily. 90 tablet 1    methylphenidate HCl (RITALIN) 10 MG tablet Take 1 tablet (10 mg total) by mouth 2 (two) times daily. 60 tablet 0    methylphenidate HCl 18 MG CR tablet Take 1 tablet (18 mg total) by mouth every morning. 90 tablet 0    metronidazole 0.75% (METROCREAM) 0.75 % Crea Apply topically 2 (two) times daily. 45 g 3    omeprazole (PRILOSEC) 40 MG capsule Take 1 capsule (40 mg total) by mouth once daily. 90 capsule 0    ondansetron (ZOFRAN) 8 MG tablet Take 1 tablet (8 mg total) by mouth every 8 (eight) hours as needed. 30 tablet 0    propranoloL (INDERAL) 10 MG tablet Take 1 tablet (10 mg total) by mouth 2 (two) times daily as needed. 90 tablet 1    rizatriptan (MAXALT-MLT) 10 MG disintegrating tablet Take 1 tablet (10 mg total) by mouth as needed for Migraine. 9 tablet 1    tacrolimus (PROTOPIC) 0.1 % ointment Apply topically 2 (two) times daily. 60 g 2    valACYclovir (VALTREX) 1000 MG tablet Take 2 tablets by mouth twice daily for 2 days as needed first signs of outbreak 30 tablet 3    XYWAV 0.5 gram/mL Soln Take 3.75 g by mouth 2 (two) times a day. 1st dose: @ 8:30 pm, 3 g @ 12 MN      [DISCONTINUED] cetirizine (ZYRTEC) 10 MG tablet Take 10 mg by mouth once daily.       No current facility-administered medications on file prior to visit.     She is allergic to gold sodium thiosulfate, imidazolidinyl urea, codeine phosphate, minocycline, and tobramycin-lotepred..      Review of Systems   Constitutional:  Negative for fatigue.   Respiratory: Negative.  Negative for apnea and snoring.    Psychiatric/Behavioral:  Negative for sleep disturbance.    All other systems reviewed and are negative.      Outpatient Encounter Medications as of 11/9/2023   Medication Sig Dispense Refill    doxycycline hyclate 50 mg Tab Take 1 tablet (50 mg)  "by mouth once daily. Take 1 by mouth everyday with food and water. Avoid lying down for 1 hour after taking. Avoid the sun. 30 tablet 11    estradiol-progesterone (BIJUVA) 1-100 mg Cap Take 1 tablet by mouth once daily. 30 capsule 11    FLUoxetine 20 MG tablet Take 1 tablet (20 mg total) by mouth once daily. 90 tablet 3    hydrOXYchloroQUINE (PLAQUENIL) 200 mg tablet Take 1 tablet (200 mg total) by mouth once daily. 90 tablet 1    methylphenidate HCl (RITALIN) 10 MG tablet Take 1 tablet (10 mg total) by mouth 2 (two) times daily. 60 tablet 0    methylphenidate HCl 18 MG CR tablet Take 1 tablet (18 mg total) by mouth every morning. 90 tablet 0    metronidazole 0.75% (METROCREAM) 0.75 % Crea Apply topically 2 (two) times daily. 45 g 3    omeprazole (PRILOSEC) 40 MG capsule Take 1 capsule (40 mg total) by mouth once daily. 90 capsule 0    ondansetron (ZOFRAN) 8 MG tablet Take 1 tablet (8 mg total) by mouth every 8 (eight) hours as needed. 30 tablet 0    propranoloL (INDERAL) 10 MG tablet Take 1 tablet (10 mg total) by mouth 2 (two) times daily as needed. 90 tablet 1    rizatriptan (MAXALT-MLT) 10 MG disintegrating tablet Take 1 tablet (10 mg total) by mouth as needed for Migraine. 9 tablet 1    tacrolimus (PROTOPIC) 0.1 % ointment Apply topically 2 (two) times daily. 60 g 2    valACYclovir (VALTREX) 1000 MG tablet Take 2 tablets by mouth twice daily for 2 days as needed first signs of outbreak 30 tablet 3    XYWAV 0.5 gram/mL Soln Take 3.75 g by mouth 2 (two) times a day. 1st dose: @ 8:30 pm, 3 g @ 12 MN      [DISCONTINUED] cetirizine (ZYRTEC) 10 MG tablet Take 10 mg by mouth once daily.       No facility-administered encounter medications on file as of 11/9/2023.       Objective:     Vital Signs (Most Recent)  Height and Weight  Height: 5' 2" (157.5 cm)  Weight: 45.4 kg (100 lb)  BSA (Calculated - sq m): 1.41 sq meters  BMI (Calculated): 18.3  Weight in (lb) to have BMI = 25: 136.4]  Wt Readings from Last 2 " "Encounters:   11/09/23 45.4 kg (100 lb)   05/18/23 44.5 kg (98 lb)       Physical Exam   Constitutional: She appears well-developed and well-nourished.   Nursing note and vitals reviewed.      Laboratory  Lab Results   Component Value Date    WBC 5.79 08/01/2023    RBC 4.59 08/01/2023    HGB 14.3 08/01/2023    HCT 41.2 08/01/2023    MCV 90 08/01/2023    MCH 31.2 (H) 08/01/2023    MCHC 34.7 08/01/2023    RDW 11.9 08/01/2023     08/01/2023    MPV 9.2 08/01/2023    GRAN 3.8 08/01/2023    GRAN 66.4 08/01/2023    LYMPH 1.4 08/01/2023    LYMPH 23.8 08/01/2023    MONO 0.4 08/01/2023    MONO 6.7 08/01/2023    EOS 0.1 08/01/2023    BASO 0.04 08/01/2023    EOSINOPHIL 2.2 08/01/2023    BASOPHIL 0.7 08/01/2023       BMP  Lab Results   Component Value Date     08/01/2023    K 4.6 08/01/2023     08/01/2023    CO2 26 08/01/2023    BUN 20 08/01/2023    CREATININE 0.8 08/01/2023    CALCIUM 9.6 08/01/2023    ANIONGAP 9 08/01/2023    ESTGFRAFRICA >60 01/11/2022    EGFRNONAA >60 01/11/2022    AST 21 08/01/2023    ALT 18 08/01/2023    PROT 7.0 08/01/2023       No results found for: "BNP"    Lab Results   Component Value Date    TSH 0.960 01/05/2023       No results found for: "SEDRATE"    No results found for: "CRP"  No results found for: "IGE"     No results found for: "ASPERGILLUS"  No results found for: "AFUMIGATUSCL"     No results found for: "ACE"      Assessment/Plan:     Problem List Items Addressed This Visit       Hypertension - Primary    Narcolepsy     [place]: 8  Night : XYWAV 3.73 gm and 3 gm  Day time : CONCERTA, methyphenodate 18 mg on AM and 10 mg in PM  No medication side effects  Tolerating well            Follow up in about 4 months (around 3/9/2024).    This note was prepared using voice recognition system and is likely to have sound alike errors that may have been overlooked even after proof reading.  Please call me with any questions    Discussed diagnosis, its evaluation, treatment and usual " course. All questions answered.      Scott Rachel MD

## 2023-11-10 ENCOUNTER — PATIENT MESSAGE (OUTPATIENT)
Dept: SLEEP MEDICINE | Facility: CLINIC | Age: 53
End: 2023-11-10
Payer: COMMERCIAL

## 2023-11-10 DIAGNOSIS — G47.419 PRIMARY NARCOLEPSY WITHOUT CATAPLEXY: ICD-10-CM

## 2023-11-10 RX ORDER — METHYLPHENIDATE HYDROCHLORIDE 18 MG/1
18 TABLET ORAL EVERY MORNING
Qty: 90 TABLET | Refills: 0 | Status: SHIPPED | OUTPATIENT
Start: 2023-11-10 | End: 2024-02-15 | Stop reason: SDUPTHER

## 2023-11-14 ENCOUNTER — TELEPHONE (OUTPATIENT)
Dept: PULMONOLOGY | Facility: CLINIC | Age: 53
End: 2023-11-14
Payer: COMMERCIAL

## 2023-11-14 NOTE — TELEPHONE ENCOUNTER
----- Message from Rosario Johnson sent at 11/14/2023 11:00 AM CST -----  Regarding: refill  Can the clinic reply in [facility]:       Please refill the medication(s) listed below. Please call the patient when the prescription(s) is ready for  at this phone number   [facility] SU[facility]REZ [facility][id number][facility]       Medication #1 XYWAV 0.5 gram/mL Soln        Preferred Pharmacy:   [facility] - [address] [address]  Phone: [phone] Fax: [phone]

## 2023-11-18 ENCOUNTER — PATIENT MESSAGE (OUTPATIENT)
Dept: SLEEP MEDICINE | Facility: CLINIC | Age: 53
End: 2023-11-18
Payer: COMMERCIAL

## 2023-11-21 ENCOUNTER — TELEPHONE (OUTPATIENT)
Dept: PULMONOLOGY | Facility: CLINIC | Age: 53
End: 2023-11-21
Payer: COMMERCIAL

## 2023-11-21 NOTE — TELEPHONE ENCOUNTER
Spoke with pharmacist. Advised her the Dr. Rachel wants to keep rx as prescribed 3.75 first dose and 3.75 2nd dose.

## 2023-11-21 NOTE — TELEPHONE ENCOUNTER
----- Message from Sindhu Martines sent at 11/21/2023  9:48 AM CST -----  .Type:  Pharmacy Calling to Clarify an RX    Name of Caller:Cirilo  Pharmacy Name:[facility]  Prescription Name:Xywav  What do they need to clarify?: dosage  Best Call Back Number:[phone] option 3 and option 4  Additional Information: pt stated she was taking 3.75 for first dose and 3 afterwards

## 2023-11-22 ENCOUNTER — PATIENT MESSAGE (OUTPATIENT)
Dept: SLEEP MEDICINE | Facility: CLINIC | Age: 53
End: 2023-11-22
Payer: COMMERCIAL

## 2023-12-02 NOTE — TELEPHONE ENCOUNTER
No care due was identified.  Health [facility] Embedded Care Due Messages. Reference number: [id number].   12/02/2023 3:00:15 PM CST

## 2023-12-03 RX ORDER — PROPRANOLOL HYDROCHLORIDE 10 MG/1
10 TABLET ORAL 2 TIMES DAILY PRN
Qty: 90 TABLET | Refills: 1 | Status: SHIPPED | OUTPATIENT
Start: 2023-12-03

## 2023-12-04 NOTE — TELEPHONE ENCOUNTER
Refill Routing Note   Medication(s) are not appropriate for processing by [facility] for the following reason(s):        Required vitals abnormal    [facility] action(s):  Defer        Medication Therapy Plan: BP 5/18/23 (!) 146/92      Appointments  past 12m or future 3m with PCP    Date Provider   Last Visit   1/5/2023 Cali Patel MD   Next Visit   Visit date not found Cali Patel MD   ED visits in past 90 days: 0        Note composed:8:37 PM 12/03/2023

## 2023-12-05 ENCOUNTER — TELEPHONE (OUTPATIENT)
Dept: PULMONOLOGY | Facility: CLINIC | Age: 53
End: 2023-12-05
Payer: COMMERCIAL

## 2023-12-05 NOTE — TELEPHONE ENCOUNTER
----- Message from Sindhu Martines sent at 12/5/2023  2:23 PM CST -----  .Type:  Pharmacy Calling to Clarify an RX    Name of Caller:[place]  Pharmacy Name:[facility]  Prescription Name: XYWAV 0.5 gram/mL Soln  What do they need to clarify?:directions  Best Call Back Number:[phone] option 3 and option 4  Additional Information: Pt stated her first dose suppose 3.75 and second dose of 3

## 2023-12-06 DIAGNOSIS — G47.419 PRIMARY NARCOLEPSY WITHOUT CATAPLEXY: ICD-10-CM

## 2023-12-07 RX ORDER — METHYLPHENIDATE HYDROCHLORIDE 10 MG/1
10 TABLET ORAL 2 TIMES DAILY
Qty: 60 TABLET | Refills: 0 | Status: SHIPPED | OUTPATIENT
Start: 2023-12-07 | End: 2024-01-11 | Stop reason: SDUPTHER

## 2024-01-01 NOTE — TELEPHONE ENCOUNTER
No care due was identified.  Health [facility] Embedded Care Due Messages. Reference number: [id number].   1/01/2024 11:02:48 AM CST

## 2024-01-02 RX ORDER — OMEPRAZOLE 40 MG/1
40 CAPSULE, DELAYED RELEASE ORAL DAILY
Qty: 90 CAPSULE | Refills: 0 | Status: SHIPPED | OUTPATIENT
Start: 2024-01-02 | End: 2025-01-01

## 2024-01-11 DIAGNOSIS — G47.419 PRIMARY NARCOLEPSY WITHOUT CATAPLEXY: ICD-10-CM

## 2024-01-12 RX ORDER — METHYLPHENIDATE HYDROCHLORIDE 10 MG/1
10 TABLET ORAL 2 TIMES DAILY
Qty: 180 TABLET | Refills: 0 | Status: SHIPPED | OUTPATIENT
Start: 2024-01-12 | End: 2024-04-17

## 2024-01-18 ENCOUNTER — CLINICAL SUPPORT (OUTPATIENT)
Dept: INTERNAL MEDICINE | Facility: CLINIC | Age: 54
End: 2024-01-18
Payer: COMMERCIAL

## 2024-01-18 ENCOUNTER — OFFICE VISIT (OUTPATIENT)
Dept: INTERNAL MEDICINE | Facility: CLINIC | Age: 54
End: 2024-01-18
Payer: COMMERCIAL

## 2024-01-18 ENCOUNTER — PATIENT MESSAGE (OUTPATIENT)
Dept: PSYCHIATRY | Facility: CLINIC | Age: 54
End: 2024-01-18
Payer: COMMERCIAL

## 2024-01-18 VITALS
DIASTOLIC BLOOD PRESSURE: 62 MMHG | SYSTOLIC BLOOD PRESSURE: 105 MMHG | HEART RATE: 70 BPM | TEMPERATURE: 98 F | WEIGHT: 97 LBS | BODY MASS INDEX: 17.85 KG/M2 | HEIGHT: 62 IN

## 2024-01-18 DIAGNOSIS — R63.4 UNINTENDED WEIGHT LOSS: ICD-10-CM

## 2024-01-18 DIAGNOSIS — F41.9 ANXIETY: ICD-10-CM

## 2024-01-18 DIAGNOSIS — G47.419 PRIMARY NARCOLEPSY WITHOUT CATAPLEXY: ICD-10-CM

## 2024-01-18 DIAGNOSIS — M13.80 SERONEGATIVE ARTHRITIS: ICD-10-CM

## 2024-01-18 DIAGNOSIS — R19.7 DIARRHEA, UNSPECIFIED TYPE: ICD-10-CM

## 2024-01-18 DIAGNOSIS — Z00.00 ROUTINE GENERAL MEDICAL EXAMINATION AT A HEALTH CARE FACILITY: Primary | ICD-10-CM

## 2024-01-18 DIAGNOSIS — R11.0 NAUSEA: ICD-10-CM

## 2024-01-18 DIAGNOSIS — I10 PRIMARY HYPERTENSION: ICD-10-CM

## 2024-01-18 LAB
ALBUMIN SERPL BCP-MCNC: 4 G/DL (ref 3.5–5.2)
ALP SERPL-CCNC: 72 U/L (ref 55–135)
ALT SERPL W/O P-5'-P-CCNC: 16 U/L (ref 10–44)
ANION GAP SERPL CALC-SCNC: 12 MMOL/L (ref 8–16)
AST SERPL-CCNC: 19 U/L (ref 10–40)
BILIRUB SERPL-MCNC: 0.3 MG/DL (ref 0.1–1)
BUN SERPL-MCNC: 15 MG/DL (ref 6–20)
CALCIUM SERPL-MCNC: 9.4 MG/DL (ref 8.7–10.5)
CHLORIDE SERPL-SCNC: 104 MMOL/L (ref 95–110)
CHOLEST SERPL-MCNC: 134 MG/DL (ref 120–199)
CHOLEST/HDLC SERPL: 2.4 {RATIO} (ref 2–5)
CO2 SERPL-SCNC: 24 MMOL/L (ref 23–29)
CREAT SERPL-MCNC: 0.8 MG/DL (ref 0.5–1.4)
ERYTHROCYTE [DISTWIDTH] IN BLOOD BY AUTOMATED COUNT: 12.1 % (ref 11.5–14.5)
ERYTHROCYTE [SEDIMENTATION RATE] IN BLOOD BY PHOTOMETRIC METHOD: 14 MM/HR (ref 0–36)
EST. GFR  (NO RACE VARIABLE): >60 ML/MIN/1.73 M^2
ESTIMATED AVG GLUCOSE: 100 MG/DL (ref 68–131)
GLUCOSE SERPL-MCNC: 138 MG/DL (ref 70–110)
HBA1C MFR BLD: 5.1 % (ref 4–5.6)
HCT VFR BLD AUTO: 41.3 % (ref 37–48.5)
HDLC SERPL-MCNC: 56 MG/DL (ref 40–75)
HDLC SERPL: 41.8 % (ref 20–50)
HGB BLD-MCNC: 14.4 G/DL (ref 12–16)
LDLC SERPL CALC-MCNC: 59.6 MG/DL (ref 63–159)
MCH RBC QN AUTO: 30.8 PG (ref 27–31)
MCHC RBC AUTO-ENTMCNC: 34.9 G/DL (ref 32–36)
MCV RBC AUTO: 88 FL (ref 82–98)
NONHDLC SERPL-MCNC: 78 MG/DL
PLATELET # BLD AUTO: 277 K/UL (ref 150–450)
PMV BLD AUTO: 9.1 FL (ref 9.2–12.9)
POTASSIUM SERPL-SCNC: 3.9 MMOL/L (ref 3.5–5.1)
PROT SERPL-MCNC: 7.3 G/DL (ref 6–8.4)
RBC # BLD AUTO: 4.67 M/UL (ref 4–5.4)
SODIUM SERPL-SCNC: 140 MMOL/L (ref 136–145)
TRIGL SERPL-MCNC: 92 MG/DL (ref 30–150)
WBC # BLD AUTO: 3.46 K/UL (ref 3.9–12.7)

## 2024-01-18 PROCEDURE — 3008F BODY MASS INDEX DOCD: CPT | Mod: CPTII,S$GLB,, | Performed by: INTERNAL MEDICINE

## 2024-01-18 PROCEDURE — 87389 HIV-1 AG W/HIV-1&-2 AB AG IA: CPT | Performed by: INTERNAL MEDICINE

## 2024-01-18 PROCEDURE — 86140 C-REACTIVE PROTEIN: CPT | Performed by: INTERNAL MEDICINE

## 2024-01-18 PROCEDURE — 3044F HG A1C LEVEL LT 7.0%: CPT | Mod: CPTII,S$GLB,, | Performed by: INTERNAL MEDICINE

## 2024-01-18 PROCEDURE — 99396 PREV VISIT EST AGE 40-64: CPT | Mod: S$GLB,,, | Performed by: INTERNAL MEDICINE

## 2024-01-18 PROCEDURE — 3078F DIAST BP <80 MM HG: CPT | Mod: CPTII,S$GLB,, | Performed by: INTERNAL MEDICINE

## 2024-01-18 PROCEDURE — 83036 HEMOGLOBIN GLYCOSYLATED A1C: CPT | Performed by: INTERNAL MEDICINE

## 2024-01-18 PROCEDURE — 86803 HEPATITIS C AB TEST: CPT | Performed by: INTERNAL MEDICINE

## 2024-01-18 PROCEDURE — 80061 LIPID PANEL: CPT | Performed by: INTERNAL MEDICINE

## 2024-01-18 PROCEDURE — 80053 COMPREHEN METABOLIC PANEL: CPT | Performed by: INTERNAL MEDICINE

## 2024-01-18 PROCEDURE — 1159F MED LIST DOCD IN RCRD: CPT | Mod: CPTII,S$GLB,, | Performed by: INTERNAL MEDICINE

## 2024-01-18 PROCEDURE — 99999 PR PBB SHADOW E&M-EST. PATIENT-LVL V: CPT | Mod: PBBFAC,,, | Performed by: INTERNAL MEDICINE

## 2024-01-18 PROCEDURE — 85652 RBC SED RATE AUTOMATED: CPT | Performed by: INTERNAL MEDICINE

## 2024-01-18 PROCEDURE — 3074F SYST BP LT 130 MM HG: CPT | Mod: CPTII,S$GLB,, | Performed by: INTERNAL MEDICINE

## 2024-01-18 PROCEDURE — 84443 ASSAY THYROID STIM HORMONE: CPT | Performed by: INTERNAL MEDICINE

## 2024-01-18 PROCEDURE — 85027 COMPLETE CBC AUTOMATED: CPT | Performed by: INTERNAL MEDICINE

## 2024-01-18 RX ORDER — PROPRANOLOL HYDROCHLORIDE 10 MG/1
10 TABLET ORAL 2 TIMES DAILY PRN
Qty: 90 TABLET | Refills: 3 | Status: CANCELLED | OUTPATIENT
Start: 2024-01-18

## 2024-01-18 RX ORDER — FLUOXETINE HYDROCHLORIDE 40 MG/1
40 CAPSULE ORAL DAILY
Qty: 30 CAPSULE | Refills: 0 | Status: SHIPPED | OUTPATIENT
Start: 2024-01-18 | End: 2024-02-04 | Stop reason: SDUPTHER

## 2024-01-18 RX ORDER — PROPRANOLOL HYDROCHLORIDE 10 MG/1
10 TABLET ORAL 2 TIMES DAILY PRN
Qty: 90 TABLET | Refills: 3 | Status: SHIPPED | OUTPATIENT
Start: 2024-01-18 | End: 2024-03-03 | Stop reason: SDUPTHER

## 2024-01-18 NOTE — PROGRESS NOTES
Subjective:      Patient ID: Kayla Lowery is a 53 y.o. female.    Chief Complaint: Executive Health    HPI    Decreased appetite. Nausea for years.     Decreased appetite with stress. Stress increased over past one year. Work/family situations/ parents health.  Some improvement over past couple months.     She is working with a work  to improve stress.     Diarrhea a couple time per week for months.           It was a pleasure to see you for your executive health physical on 1/18/24     Your sleep doctor Dr. Rachel.      Your a 53-year-old with a past medical history of attention deficit disorder, anxiety, hypertension, migraines, narcolepsy, and rosacea, fever blisters.      Your current medications include Valtrex, Maxalt, estrodiol/progesterone, propranolol, Zofran, methyphenidate, fluoxetine, doxycycline, Zyrtec, xywav, over counter nexium.      You never smoked.      You have experienced side effects with :  Codeine - nausea  Minocycline  - Vertigo  Doxycycline - Sun sensitivity.   Tobramycin ophthalmic - burning to eyes.         Your family history includes:  Father - unknown health history  Mother - Diabetes, hyperlipidemia, hypertension, parkinson, scoliosis  Siblings - sister with stroke, obesity, hypertension  Son - narcolepsy, dyspraxia, dyslexia      Preventative healthcare:  Flu vaccine - reported up to date  Tetanus vaccine - reported up to date  Check with your pharmacy regarding new shingles vaccine.   COVID vaccine - reported up to date  Colonoscopy - reported up to date  Mammogram- up to date.  Gyn exam - See Dr. Teague.        Review of Systems   Constitutional:  Negative for chills and fever.   HENT:  Negative for ear pain and sore throat.    Respiratory:  Negative for cough, shortness of breath and wheezing.    Cardiovascular:  Negative for chest pain.   Gastrointestinal:  Negative for abdominal pain and blood in stool.   Genitourinary:  Negative for dysuria and hematuria.  "  Neurological:  Negative for seizures and syncope.     Objective:   /62   Pulse 70   Temp 97.6 °F (36.4 °C)   Ht 5' 2" (1.575 m)   Wt 44 kg (97 lb)   BMI 17.74 kg/m²     Physical Exam  Constitutional:       General: She is awake. She is not in acute distress.     Appearance: Normal appearance. She is well-developed.   HENT:      Head: Normocephalic and atraumatic.      Right Ear: External ear normal.      Left Ear: External ear normal.      Mouth/Throat:      Mouth: Mucous membranes are moist.      Pharynx: Oropharynx is clear.   Eyes:      Conjunctiva/sclera: Conjunctivae normal.      Pupils: Pupils are equal, round, and reactive to light.   Neck:      Thyroid: No thyromegaly.   Cardiovascular:      Rate and Rhythm: Normal rate and regular rhythm.   Pulmonary:      Effort: Pulmonary effort is normal.      Breath sounds: Normal breath sounds. No wheezing or rales.   Abdominal:      General: Bowel sounds are normal.      Palpations: Abdomen is soft.      Tenderness: There is no abdominal tenderness.   Musculoskeletal:         General: No swelling.      Cervical back: Normal range of motion and neck supple.   Lymphadenopathy:      Cervical: No cervical adenopathy.   Skin:     General: Skin is warm and dry.   Neurological:      Mental Status: She is alert and oriented to person, place, and time. Mental status is at baseline.   Psychiatric:         Mood and Affect: Mood normal.         Behavior: Behavior normal. Behavior is cooperative.         Thought Content: Thought content normal.         Judgment: Judgment normal.         Lab Results   Component Value Date    WBC 3.46 (L) 01/18/2024    HGB 14.4 01/18/2024    HGB 14.3 08/01/2023    HGB 13.6 01/05/2023    HCT 41.3 01/18/2024    MCV 88 01/18/2024    MCV 90 08/01/2023    MCV 88 01/05/2023     01/18/2024    CHOL 134 01/18/2024    TRIG 92 01/18/2024    HDL 56 01/18/2024    LDLCALC 59.6 (L) 01/18/2024    LDLCALC 86.0 01/05/2023    LDLCALC 91.8 " 01/11/2022    ALT 16 01/18/2024    AST 19 01/18/2024     01/18/2024    K 3.9 01/18/2024    CALCIUM 9.4 01/18/2024     01/18/2024    CO2 24 01/18/2024    BUN 15 01/18/2024    CREATININE 0.8 01/18/2024    CREATININE 0.8 08/01/2023    CREATININE 0.7 01/05/2023    EGFRNORACEVR >60 01/18/2024    EGFRNORACEVR >60 08/01/2023    EGFRNORACEVR >60 01/05/2023    TSH 0.960 01/05/2023    TSH 1.470 01/11/2022    TSH 1.33 11/09/2020     (H) 01/18/2024    HGBA1C 5.1 01/18/2024    HGBA1C 5.2 01/05/2023    HGBA1C 5.2 01/11/2022          The 10-year ASCVD risk score (Douglas JO, et al., 2019) is: 1.2%    Values used to calculate the score:      Age: 53 years      Sex: Female      Is Non- : No      Diabetic: No      Tobacco smoker: No      Systolic Blood Pressure: 105 mmHg      Is BP treated: Yes      HDL Cholesterol: 56 mg/dL      Total Cholesterol: 134 mg/dL     Assessment:     1. Routine general medical examination at a health care facility    2. Primary hypertension    3. Primary narcolepsy without cataplexy    4. Anxiety    5. Seronegative arthritis    6. Unintended weight loss    7. Nausea    8. Diarrhea, unspecified type      Plan:   1. Routine general medical examination at a health care facility  Overview:  Heart healthy diet, regular exercise, and regular use of sunscreen.    reviewed    Orders:  -     TSH; Future; Expected date: 01/18/2024    2. Primary hypertension  -     propranoloL (INDERAL) 10 MG tablet; Take 1 tablet (10 mg total) by mouth 2 (two) times daily as needed.  Dispense: 90 tablet; Refill: 3    3. Primary narcolepsy without cataplexy    4. Anxiety  -     FLUoxetine 40 MG capsule; Take 1 capsule (40 mg total) by mouth once daily.  Dispense: 30 capsule; Refill: 0  -     Ambulatory referral/consult to Psychiatry; Future; Expected date: 01/25/2024    5. Seronegative arthritis  Overview:  Dx with Dr. Glover      6. Unintended weight loss  -     Ambulatory referral/consult  to Gastroenterology; Future; Expected date: 01/25/2024  -     X-Ray Chest PA And Lateral; Future; Expected date: 01/18/2024  -     HIV 1/2 Ag/Ab (4th Gen); Future; Expected date: 01/18/2024  -     HEPATITIS C ANTIBODY; Future; Expected date: 01/18/2024  -     Occult blood x 1, stool; Future; Expected date: 01/18/2024  -     Sedimentation rate; Future; Expected date: 01/18/2024  -     C-Reactive Protein; Future; Expected date: 01/18/2024    7. Nausea  -     Ambulatory referral/consult to Gastroenterology; Future; Expected date: 01/25/2024    8. Diarrhea, unspecified type  -     Ambulatory referral/consult to Gastroenterology; Future; Expected date: 01/25/2024        There are no Patient Instructions on file for this visit.    Future Appointments   Date Time Provider Department Center   1/25/2024  6:45 AM HGV XR2 HGVH XRAY [facility]   3/14/2024  1:00 PM Scott Rachel MD [facility] SLEEP [facility]   4/18/2024  1:00 PM Cali Patel MD [facility] IM [facility]   5/16/2024  4:00 PM Annie Bell MD [facility] GASTRO [facility]       Lab Frequency Next Occurrence   ROCHELLE Screen w/Reflex     CBC Auto Differential     Comprehensive Metabolic Panel     CBC Auto Differential     Comprehensive Metabolic Panel     C-Reactive Protein         Follow up in about 3 months (around 4/18/2024), or if symptoms worsen or fail to improve.

## 2024-01-18 NOTE — TELEPHONE ENCOUNTER
No care due was identified.  [facility] Embedded Care Due Messages. Reference number: [id number].   1/18/2024 4:45:42 PM CST

## 2024-01-19 LAB
CRP SERPL-MCNC: 14.8 MG/L (ref 0–8.2)
HCV AB SERPL QL IA: NORMAL
HIV 1+2 AB+HIV1 P24 AG SERPL QL IA: NORMAL
TSH SERPL DL<=0.005 MIU/L-ACNC: 1.16 UIU/ML (ref 0.4–4)

## 2024-01-25 ENCOUNTER — HOSPITAL ENCOUNTER (OUTPATIENT)
Dept: RADIOLOGY | Facility: HOSPITAL | Age: 54
Discharge: HOME | End: 2024-01-25
Attending: INTERNAL MEDICINE
Payer: COMMERCIAL

## 2024-01-25 DIAGNOSIS — R63.4 UNINTENDED WEIGHT LOSS: ICD-10-CM

## 2024-01-25 PROCEDURE — 71046 X-RAY EXAM CHEST 2 VIEWS: CPT | Mod: 26,,, | Performed by: RADIOLOGY

## 2024-01-25 PROCEDURE — 71046 X-RAY EXAM CHEST 2 VIEWS: CPT | Mod: TC

## 2024-02-04 ENCOUNTER — PATIENT MESSAGE (OUTPATIENT)
Dept: INTERNAL MEDICINE | Facility: CLINIC | Age: 54
End: 2024-02-04
Payer: COMMERCIAL

## 2024-02-04 DIAGNOSIS — F41.9 ANXIETY: ICD-10-CM

## 2024-02-05 ENCOUNTER — PATIENT MESSAGE (OUTPATIENT)
Dept: INTERNAL MEDICINE | Facility: CLINIC | Age: 54
End: 2024-02-05
Payer: COMMERCIAL

## 2024-02-05 RX ORDER — FLUOXETINE HYDROCHLORIDE 40 MG/1
40 CAPSULE ORAL DAILY
Qty: 30 CAPSULE | Refills: 0 | Status: SHIPPED | OUTPATIENT
Start: 2024-02-05 | End: 2024-03-03 | Stop reason: SDUPTHER

## 2024-02-05 NOTE — TELEPHONE ENCOUNTER
No care due was identified.  [facility] Embedded Care Due Messages. Reference number: [id number].   2/04/2024 7:16:03 PM CST

## 2024-02-15 ENCOUNTER — OFFICE VISIT (OUTPATIENT)
Dept: SLEEP MEDICINE | Facility: CLINIC | Age: 54
End: 2024-02-15
Payer: COMMERCIAL

## 2024-02-15 ENCOUNTER — PATIENT MESSAGE (OUTPATIENT)
Dept: SLEEP MEDICINE | Facility: CLINIC | Age: 54
End: 2024-02-15

## 2024-02-15 VITALS
SYSTOLIC BLOOD PRESSURE: 105 MMHG | WEIGHT: 97 LBS | HEIGHT: 62 IN | BODY MASS INDEX: 17.85 KG/M2 | DIASTOLIC BLOOD PRESSURE: 62 MMHG

## 2024-02-15 DIAGNOSIS — G47.419 PRIMARY NARCOLEPSY WITHOUT CATAPLEXY: ICD-10-CM

## 2024-02-15 PROCEDURE — 1159F MED LIST DOCD IN RCRD: CPT | Mod: CPTII,95,, | Performed by: INTERNAL MEDICINE

## 2024-02-15 PROCEDURE — 1160F RVW MEDS BY RX/DR IN RCRD: CPT | Mod: CPTII,95,, | Performed by: INTERNAL MEDICINE

## 2024-02-15 PROCEDURE — 3044F HG A1C LEVEL LT 7.0%: CPT | Mod: CPTII,95,, | Performed by: INTERNAL MEDICINE

## 2024-02-15 PROCEDURE — 99213 OFFICE O/P EST LOW 20 MIN: CPT | Mod: 95,,, | Performed by: INTERNAL MEDICINE

## 2024-02-15 PROCEDURE — 3008F BODY MASS INDEX DOCD: CPT | Mod: CPTII,95,, | Performed by: INTERNAL MEDICINE

## 2024-02-15 PROCEDURE — 3074F SYST BP LT 130 MM HG: CPT | Mod: CPTII,95,, | Performed by: INTERNAL MEDICINE

## 2024-02-15 PROCEDURE — 3078F DIAST BP <80 MM HG: CPT | Mod: CPTII,95,, | Performed by: INTERNAL MEDICINE

## 2024-02-15 RX ORDER — METHYLPHENIDATE HYDROCHLORIDE 18 MG/1
18 TABLET ORAL EVERY MORNING
Qty: 90 TABLET | Refills: 0 | Status: SHIPPED | OUTPATIENT
Start: 2024-02-15 | End: 2024-05-12 | Stop reason: SDUPTHER

## 2024-02-15 NOTE — PROGRESS NOTES
The patient location is: [facility]  The chief complaint leading to consultation is: Narcolepsy    Visit type: audiovisual    Face to Face time with patient: 4 min  30 minutes of total time spent on the encounter, which includes face to face time and non-face to face time preparing to see the patient (eg, review of tests), Obtaining and/or reviewing separately obtained history, Documenting clinical information in the electronic or other health record, Independently interpreting results (not separately reported) and communicating results to the patient/family/caregiver, or Care coordination (not separately reported).         Each patient to whom he or she provides medical services by telemedicine is:  (1) informed of the relationship between the physician and patient and the respective role of any other health care provider with respect to management of the patient; and (2) notified that he or she may decline to receive medical services by telemedicine and may withdraw from such care at any time.    Notes:                                             Pulmonary Outpatient  Visit     Subjective:       Patient ID: Kayla Lowery is a 53 y.o. female.    Chief Complaint: narcolepsy        Kayla Lowery is 52 y.o.  Physician: [facility]  [facility]  Xywav and methylphenidate  Sleep study was done in 2006:   Displaced here after [place]  Likely had narcolepsy since 12 years, Had ADHD  Since will fall asleep during tests  Seen psychologist and neurologist  Bed time: 9-0930pm  Wake time: 0430 am  SOL 15 mins  No problems with Meds  Notes reviewed  Had tremor  Sleep paralysis and hallucination Last event  2014    Last event: cannot remember  No MVA     Notes reveiwed  1. Primary narcolepsy without cataplexy     PLAN    Kayla was seen today for narcolepsy without cataplexy.    Diagnoses and all orders for this visit:    Primary narcolepsy without cataplexy    1.  Do Not Drive if you are sleepy!  2. Will continue xywav at 3.75g and 3 grams at night.Will change the dosing with new rx.   3. Continue methylphenidate 18 mg po daily. And ritalin 10 mg BID Prn.    Electronically signed by Rodo Wilson MD at 09/14/2021 1:42 PM CDT          08/11/2022  Last 04/26/2022  Doing well on Xywav 1 st dose 3.75gm and 2nd dose 3 gm  Wake up 05:30 am  Doing well in day time   Takes concerta in AM  Lasts 6 hrs  methyphenolate x 2 in PM  Inderal for tachy and tremors    12/08/2022  Last visit 08/11/2022  Doing well on Xywav 1 st dose 3.75gm and 2nd dose 3 gm  Wake up 05:30 am  Doing well in day time   Takes concerta in AM  Lasts 6 hrs  methyphenolate x 2 in PM  Inderal for tachy and tremors  Recent left hand #  Consider BRAND CONCERTA  Asked about WAKIX: will interact with [facility]    05/11/2023  Fopllow up   Narcolepsy  [place]  Doing well on Xywav 1 st dose ( 09:30 pm)  3.75gm and 2nd dose 3 gm ( 1 am)  Wake up 05:30 am  Doing well in day time   No side effect  Propranolol for tremor  Takes concerta in AM  Lasts 6 hrs  methyphenolate x 2 in PM  Epworth7  CBC and TSH reveiwed  Recently started Plaquenil    11/09/2023   Follow-up appointment  Narcolepsy epworth score  Doing well on meds:  Trouble with weight: /84,  Pulse 73  XYWAV: Doing well on Xywav 1 st dose ( 09:30 pm)  3.75gm and 2nd dose 3 gm ( 1 am)  No DTS: some methylphenidate 10 mg and 18 mg    02/15/2024  Followup   Tolerating therapy protocol without any side effects    No daytime sleepiness   Blood pressure well controlled   Needs refill for her extended-release methylphenidate 18 milligrams   Stable on YXWAV 3.75 gm 1st dose then 3 gm second dose              2/15/2024     1:44 PM   EPWORTH SLEEPINESS SCALE   Sitting and reading 1   Watching TV 1   Sitting, inactive in a public place (e.g. a theatre or a meeting) 1   As a passenger in a car for an hour without a break 1   Lying down to rest in the afternoon when circumstances  permit 2   Sitting and talking to someone 0   Sitting quietly after a lunch without alcohol 0   In a car, while stopped for a few minutes in traffic 0   Total score 6             The following portions of the patient's history were reviewed and updated as appropriate:   She  has a past medical history of ADHD (attention deficit hyperactivity disorder), Anxiety, History of abnormal cervical Pap smear (2012), Hypertension, Migraines, Narcolepsy, and Rosacea.  She does not have any pertinent problems on file.  She  has no past surgical history on file.  Her family history includes Alcohol abuse in her father; Arthritis in her sister and sister; Asthma in her son; Bladder Cancer in her maternal grandfather; Coronary artery disease in her mother; Depression in her mother, sister, sister, and son; Diabetes in her maternal aunt and mother; Drug abuse in her father; Hearing loss in her mother; Heart disease in her maternal grandfather, mother, and paternal grandfather; Hyperlipidemia in her maternal aunt and mother; Hypertension in her maternal grandfather, mother, paternal grandfather, and sister; Learning disabilities in her son; Macular degeneration in her maternal aunt; Migraines in her sister; Miscarriages / Stillbirths in her maternal aunt and mother; Parkinsonism in her mother; Prostate cancer in her paternal grandfather; Stroke in her sister and sister.  She  reports that she has never smoked. She has never used smokeless tobacco. She reports that she does not currently use alcohol after a past usage of about 1.0 standard drink of alcohol per week. She reports that she does not use drugs.  She has a current medication list which includes the following prescription(s): doxycycline hyclate, bijuva, fluoxetine, methylphenidate hcl, metronidazole 0.75%, omeprazole, ondansetron, propranolol, propranolol, rizatriptan, tacrolimus, valacyclovir, xywav, and methylphenidate hcl.  Current Outpatient Medications on File Prior  to Visit   Medication Sig Dispense Refill    doxycycline hyclate 50 mg Tab Take 1 tablet (50 mg) by mouth once daily. Take 1 by mouth everyday with food and water. Avoid lying down for 1 hour after taking. Avoid the sun. 30 tablet 11    estradiol-progesterone (BIJUVA) 1-100 mg Cap Take 1 tablet by mouth once daily. 30 capsule 11    FLUoxetine 40 MG capsule Take 1 capsule (40 mg total) by mouth once daily. 30 capsule 0    methylphenidate HCl (RITALIN) 10 MG tablet Take 1 tablet (10 mg total) by mouth 2 (two) times daily. 180 tablet 0    metronidazole 0.75% (METROCREAM) 0.75 % Crea Apply topically 2 (two) times daily. 45 g 3    omeprazole (PRILOSEC) 40 MG capsule Take 1 capsule (40 mg total) by mouth once daily. 90 capsule 0    ondansetron (ZOFRAN) 8 MG tablet Take 1 tablet (8 mg total) by mouth every 8 (eight) hours as needed. 30 tablet 0    propranoloL (INDERAL) 10 MG tablet Take 1 tablet (10 mg total) by mouth 2 (two) times daily as needed. 90 tablet 1    propranoloL (INDERAL) 10 MG tablet Take 1 tablet (10 mg total) by mouth 2 (two) times daily as needed. 90 tablet 3    rizatriptan (MAXALT-MLT) 10 MG disintegrating tablet Take 1 tablet (10 mg total) by mouth as needed for Migraine. 9 tablet 1    tacrolimus (PROTOPIC) 0.1 % ointment Apply topically 2 (two) times daily. 60 g 2    valACYclovir (VALTREX) 1000 MG tablet Take 2 tablets by mouth twice daily for 2 days as needed first signs of outbreak 30 tablet 3    XYWAV 0.5 gram/mL Soln Take 3.75 g by mouth 2 (two) times a day. 1st dose: @ 8:30 pm, 3 g @ 12 MN      [DISCONTINUED] methylphenidate HCl 18 MG CR tablet Take 1 tablet (18 mg total) by mouth every morning. 90 tablet 0     No current facility-administered medications on file prior to visit.     She is allergic to gold sodium thiosulfate, imidazolidinyl urea, codeine phosphate, minocycline, and tobramycin-lotepred..      Review of Systems   Constitutional:  Negative for fatigue.   Respiratory: Negative.   Negative for apnea and snoring.    Psychiatric/Behavioral:  Negative for sleep disturbance.    All other systems reviewed and are negative.      Outpatient Encounter Medications as of 2/15/2024   Medication Sig Dispense Refill    doxycycline hyclate 50 mg Tab Take 1 tablet (50 mg) by mouth once daily. Take 1 by mouth everyday with food and water. Avoid lying down for 1 hour after taking. Avoid the sun. 30 tablet 11    estradiol-progesterone (BIJUVA) 1-100 mg Cap Take 1 tablet by mouth once daily. 30 capsule 11    FLUoxetine 40 MG capsule Take 1 capsule (40 mg total) by mouth once daily. 30 capsule 0    methylphenidate HCl (RITALIN) 10 MG tablet Take 1 tablet (10 mg total) by mouth 2 (two) times daily. 180 tablet 0    metronidazole 0.75% (METROCREAM) 0.75 % Crea Apply topically 2 (two) times daily. 45 g 3    omeprazole (PRILOSEC) 40 MG capsule Take 1 capsule (40 mg total) by mouth once daily. 90 capsule 0    ondansetron (ZOFRAN) 8 MG tablet Take 1 tablet (8 mg total) by mouth every 8 (eight) hours as needed. 30 tablet 0    propranoloL (INDERAL) 10 MG tablet Take 1 tablet (10 mg total) by mouth 2 (two) times daily as needed. 90 tablet 1    propranoloL (INDERAL) 10 MG tablet Take 1 tablet (10 mg total) by mouth 2 (two) times daily as needed. 90 tablet 3    rizatriptan (MAXALT-MLT) 10 MG disintegrating tablet Take 1 tablet (10 mg total) by mouth as needed for Migraine. 9 tablet 1    tacrolimus (PROTOPIC) 0.1 % ointment Apply topically 2 (two) times daily. 60 g 2    valACYclovir (VALTREX) 1000 MG tablet Take 2 tablets by mouth twice daily for 2 days as needed first signs of outbreak 30 tablet 3    XYWAV 0.5 gram/mL Soln Take 3.75 g by mouth 2 (two) times a day. 1st dose: @ 8:30 pm, 3 g @ 12 MN      methylphenidate HCl 18 MG CR tablet Take 1 tablet (18 mg total) by mouth every morning. 90 tablet 0     No facility-administered encounter medications on file as of 2/15/2024.       Objective:     Vital Signs (Most  "Recent)  Vital Signs  BP: 105/62  Height and Weight  Height: 5' 2" (157.5 cm)  Weight: 44 kg (97 lb)  BSA (Calculated - sq m): 1.39 sq meters  BMI (Calculated): 17.7  Weight in (lb) to have BMI = 25: 136.4]  Wt Readings from Last 2 Encounters:   02/15/24 44 kg (97 lb)   01/18/24 44 kg (97 lb)       Physical Exam   Constitutional: She appears well-developed and well-nourished.   Nursing note and vitals reviewed.      Laboratory  Lab Results   Component Value Date    WBC 3.46 (L) 01/18/2024    RBC 4.67 01/18/2024    HGB 14.4 01/18/2024    HCT 41.3 01/18/2024    MCV 88 01/18/2024    MCH 30.8 01/18/2024    MCHC 34.9 01/18/2024    RDW 12.1 01/18/2024     01/18/2024    MPV 9.1 (L) 01/18/2024    GRAN 3.8 08/01/2023    GRAN 66.4 08/01/2023    LYMPH 1.4 08/01/2023    LYMPH 23.8 08/01/2023    MONO 0.4 08/01/2023    MONO 6.7 08/01/2023    EOS 0.1 08/01/2023    BASO 0.04 08/01/2023    EOSINOPHIL 2.2 08/01/2023    BASOPHIL 0.7 08/01/2023       BMP  Lab Results   Component Value Date     01/18/2024    K 3.9 01/18/2024     01/18/2024    CO2 24 01/18/2024    BUN 15 01/18/2024    CREATININE 0.8 01/18/2024    CALCIUM 9.4 01/18/2024    ANIONGAP 12 01/18/2024    ESTGFRAFRICA >60 01/11/2022    EGFRNONAA >60 01/11/2022    AST 19 01/18/2024    ALT 16 01/18/2024    PROT 7.3 01/18/2024       No results found for: "BNP"    Lab Results   Component Value Date    TSH 1.163 01/18/2024       Lab Results   Component Value Date    SEDRATE 14 01/18/2024       Lab Results   Component Value Date    CRP 14.8 (H) 01/18/2024     No results found for: "IGE"     No results found for: "ASPERGILLUS"  No results found for: "AFUMIGATUSCL"     No results found for: "ACE"      Assessment/Plan:     Problem List Items Addressed This Visit       Narcolepsy    Relevant Medications    methylphenidate HCl 18 MG CR tablet      Stable on current  narcolepsy regimen    Follow up in about 5 months (around 7/15/2024), or refill meds.    This note was " prepared using voice recognition system and is likely to have sound alike errors that may have been overlooked even after proof reading.  Please call me with any questions    Discussed diagnosis, its evaluation, treatment and usual course. All questions answered.      Scott Rachel MD

## 2024-03-03 ENCOUNTER — PATIENT MESSAGE (OUTPATIENT)
Dept: ADMINISTRATIVE | Facility: OTHER | Age: 54
End: 2024-03-03
Payer: COMMERCIAL

## 2024-03-03 DIAGNOSIS — I10 PRIMARY HYPERTENSION: ICD-10-CM

## 2024-03-03 DIAGNOSIS — F41.9 ANXIETY: ICD-10-CM

## 2024-03-03 RX ORDER — FLUOXETINE HYDROCHLORIDE 40 MG/1
40 CAPSULE ORAL DAILY
Qty: 90 CAPSULE | Refills: 3 | Status: SHIPPED | OUTPATIENT
Start: 2024-03-03 | End: 2024-09-03

## 2024-03-03 NOTE — TELEPHONE ENCOUNTER
Refill Routing Note   Medication(s) are not appropriate for processing by [facility] for the following reason(s):        New or recently adjusted medication    [facility] action(s):  Defer             Appointments  past 12m or future 3m with PCP    Date Provider   Last Visit   1/18/2024 Cali Patel MD   Next Visit   3/3/2024 Cali Patel MD   ED visits in past 90 days: 0        Note composed:9:16 AM 03/03/2024

## 2024-03-03 NOTE — TELEPHONE ENCOUNTER
No care due was identified.  [facility] Embedded Care Due Messages. Reference number: [id number].   3/03/2024 8:43:31 AM CST

## 2024-03-03 NOTE — TELEPHONE ENCOUNTER
No care due was identified.  [facility] Embedded Care Due Messages. Reference number: [id number].   3/03/2024 8:44:04 AM CST

## 2024-03-04 RX ORDER — PROPRANOLOL HYDROCHLORIDE 10 MG/1
10 TABLET ORAL 2 TIMES DAILY PRN
Qty: 90 TABLET | Refills: 3 | Status: SHIPPED | OUTPATIENT
Start: 2024-03-04 | End: 2024-04-18

## 2024-03-22 ENCOUNTER — PATIENT MESSAGE (OUTPATIENT)
Dept: SLEEP MEDICINE | Facility: CLINIC | Age: 54
End: 2024-03-22
Payer: COMMERCIAL

## 2024-04-17 DIAGNOSIS — G47.419 PRIMARY NARCOLEPSY WITHOUT CATAPLEXY: ICD-10-CM

## 2024-04-18 ENCOUNTER — TELEPHONE (OUTPATIENT)
Dept: RHEUMATOLOGY | Facility: CLINIC | Age: 54
End: 2024-04-18
Payer: COMMERCIAL

## 2024-04-18 ENCOUNTER — OFFICE VISIT (OUTPATIENT)
Dept: INTERNAL MEDICINE | Facility: CLINIC | Age: 54
End: 2024-04-18
Payer: COMMERCIAL

## 2024-04-18 VITALS
OXYGEN SATURATION: 100 % | TEMPERATURE: 97 F | HEART RATE: 76 BPM | SYSTOLIC BLOOD PRESSURE: 136 MMHG | WEIGHT: 101 LBS | BODY MASS INDEX: 18.58 KG/M2 | DIASTOLIC BLOOD PRESSURE: 80 MMHG | HEIGHT: 62 IN

## 2024-04-18 DIAGNOSIS — R63.4 UNINTENDED WEIGHT LOSS: ICD-10-CM

## 2024-04-18 DIAGNOSIS — G47.419 PRIMARY NARCOLEPSY WITHOUT CATAPLEXY: Primary | ICD-10-CM

## 2024-04-18 DIAGNOSIS — I10 PRIMARY HYPERTENSION: ICD-10-CM

## 2024-04-18 DIAGNOSIS — F41.9 ANXIETY: ICD-10-CM

## 2024-04-18 PROCEDURE — 3044F HG A1C LEVEL LT 7.0%: CPT | Mod: CPTII,S$GLB,, | Performed by: INTERNAL MEDICINE

## 2024-04-18 PROCEDURE — 3079F DIAST BP 80-89 MM HG: CPT | Mod: CPTII,S$GLB,, | Performed by: INTERNAL MEDICINE

## 2024-04-18 PROCEDURE — 99999 PR PBB SHADOW E&M-EST. PATIENT-LVL IV: CPT | Mod: PBBFAC,,, | Performed by: INTERNAL MEDICINE

## 2024-04-18 PROCEDURE — 99214 OFFICE O/P EST MOD 30 MIN: CPT | Mod: S$GLB,,, | Performed by: INTERNAL MEDICINE

## 2024-04-18 PROCEDURE — 1159F MED LIST DOCD IN RCRD: CPT | Mod: CPTII,S$GLB,, | Performed by: INTERNAL MEDICINE

## 2024-04-18 PROCEDURE — 3008F BODY MASS INDEX DOCD: CPT | Mod: CPTII,S$GLB,, | Performed by: INTERNAL MEDICINE

## 2024-04-18 PROCEDURE — 3075F SYST BP GE 130 - 139MM HG: CPT | Mod: CPTII,S$GLB,, | Performed by: INTERNAL MEDICINE

## 2024-04-18 RX ORDER — PROPRANOLOL HYDROCHLORIDE 10 MG/1
10 TABLET ORAL 2 TIMES DAILY PRN
Qty: 90 TABLET | Refills: 1 | Status: SHIPPED | OUTPATIENT
Start: 2024-04-18 | End: 2024-05-08 | Stop reason: DRUGHIGH

## 2024-04-18 NOTE — TELEPHONE ENCOUNTER
Called patient to let her appointment on 5-23 was cancelled due to the doctor being out of clinic. She ask if she could have an appointment on a Thursday at [facility]. I told her she could be see on 05/30/2024 She said that's good.

## 2024-04-18 NOTE — PROGRESS NOTES
nSubjective:      Patient ID: Kayla Lowery is a 54 y.o. female.    Chief Complaint: Follow-up    HPI    53 yo with   Patient Active Problem List   Diagnosis    Narcolepsy    Hypertension    Pain    Decreased range of motion of finger of left hand    Decreased activities of daily living (ADL)    Decreased  strength of left hand    Anxiety    Seronegative arthritis    Routine general medical examination at a health care facility     Past Medical History:   Diagnosis Date    ADHD (attention deficit hyperactivity disorder)     Anxiety     History of abnormal cervical Pap smear 2012    Hypertension     Migraines     Narcolepsy     Rosacea      Here today for follow-up on nausea, decreased appetite, weight loss, nausea anxiety.    Appetite has not improved much at all.  She has not lost any more weight but does not feel that she is gaining weight.      She has been receiving some counseling and work coaching.  She feels there has been some mild improvement in her anxiety.  Unsure if this is resultant from counseling versus increase fluoxetine.  Patient feels that her loose stools and nausea have mildly improved with the discontinuation of Plaquenil.  Declines buspirone or other meds.  Declines psyc referral but will let me know if changes mind.     Feels omeprazole helps with stress stomach/nausea.                   Review of Systems   Constitutional:  Negative for chills and fever.   Respiratory:  Negative for cough, shortness of breath and wheezing.    Cardiovascular:  Negative for chest pain and palpitations.   Gastrointestinal:  Positive for nausea. Negative for abdominal pain, blood in stool, constipation and diarrhea.   Skin:  Negative for rash and wound.   Psychiatric/Behavioral:  Negative for dysphoric mood, hallucinations and suicidal ideas. The patient is nervous/anxious.      Objective:   /80 (BP Location: Right arm, Patient Position: Sitting, BP Method: Medium (Manual))   Pulse 76   Temp 97.4  "°F (36.3 °C) (Tympanic)   Ht 5' 2" (1.575 m)   Wt 45.8 kg (100 lb 15.5 oz)   SpO2 100%   BMI 18.47 kg/m²     Physical Exam  Constitutional:       General: She is not in acute distress.     Appearance: She is well-developed. She is not ill-appearing, toxic-appearing or diaphoretic.   HENT:      Head: Normocephalic and atraumatic.   Eyes:      Extraocular Movements: Extraocular movements intact.      Conjunctiva/sclera: Conjunctivae normal.   Neck:      Thyroid: No thyromegaly.   Cardiovascular:      Rate and Rhythm: Normal rate and regular rhythm.   Pulmonary:      Breath sounds: Normal breath sounds. No wheezing or rales.   Abdominal:      General: Bowel sounds are normal.      Palpations: Abdomen is soft.      Tenderness: There is no abdominal tenderness.   Musculoskeletal:         General: No swelling.      Cervical back: Neck supple. No rigidity.   Lymphadenopathy:      Cervical: No cervical adenopathy.   Skin:     General: Skin is warm and dry.   Neurological:      Mental Status: She is alert and oriented to person, place, and time.   Psychiatric:         Behavior: Behavior normal.         Lab Results   Component Value Date    WBC 3.46 (L) 01/18/2024    HGB 14.4 01/18/2024    HGB 14.3 08/01/2023    HGB 13.6 01/05/2023    HCT 41.3 01/18/2024    MCV 88 01/18/2024    MCV 90 08/01/2023    MCV 88 01/05/2023     01/18/2024    CHOL 134 01/18/2024    TRIG 92 01/18/2024    HDL 56 01/18/2024    LDLCALC 59.6 (L) 01/18/2024    LDLCALC 86.0 01/05/2023    LDLCALC 91.8 01/11/2022    ALT 16 01/18/2024    AST 19 01/18/2024     01/18/2024    K 3.9 01/18/2024    CALCIUM 9.4 01/18/2024     01/18/2024    CO2 24 01/18/2024    BUN 15 01/18/2024    CREATININE 0.8 01/18/2024    CREATININE 0.8 08/01/2023    CREATININE 0.7 01/05/2023    EGFRNORACEVR >60 01/18/2024    EGFRNORACEVR >60 08/01/2023    EGFRNORACEVR >60 01/05/2023    TSH 1.163 01/18/2024    TSH 0.960 01/05/2023    TSH 1.470 01/11/2022     (H) " 01/18/2024    HGBA1C 5.1 01/18/2024    HGBA1C 5.2 01/05/2023    HGBA1C 5.2 01/11/2022          The 10-year ASCVD risk score (Douglas JO, et al., 2019) is: 2.2%    Values used to calculate the score:      Age: 54 years      Sex: Female      Is Non- : No      Diabetic: No      Tobacco smoker: No      Systolic Blood Pressure: 136 mmHg      Is BP treated: Yes      HDL Cholesterol: 56 mg/dL      Total Cholesterol: 134 mg/dL     Assessment:     1. Primary narcolepsy without cataplexy    2. Primary hypertension    3. Anxiety    4. Unintended weight loss      Plan:   1. Primary narcolepsy without cataplexy  Overview:  Stable.  Continue follow-up and recommendations as per sleep Clinic      2. Primary hypertension  Overview:  Controlled.  Continue current medications      3. Anxiety  Assessment & Plan:  Mild improvement patient declines further adjustment of medications.       4. Unintended weight loss  -     CT Chest Abdomen Pelvis W W/O Contrast (XPD); Future; Expected date: 04/18/2024        There are no Patient Instructions on file for this visit.    Future Appointments   Date Time Provider Department Center   5/16/2024  4:00 PM Annie Bell MD [facility] GASTRO [place]   5/23/2024 10:00 AM Ely Spencer MD ON [facility]  [facility]   5/30/2024 10:10 AM Marleny Torres MD [facility] [place] [facility]   6/6/2024  7:00 AM [facility] [id number] [facility] M[facility] [place]   6/6/2024  7:45 AM Ansley Isaac MD [facility] DERM [facility]   7/11/2024 10:20 AM Scott Rachel MD [facility] SLEEP [place]   7/18/2024  3:40 PM Scott Rachel MD [facility] SLEEP [place]   8/22/2024  1:00 PM Tino Mcgarry MD [id number]       Lab Frequency Next Occurrence   Ambulatory referral/consult to Gastroenterology Once 01/25/2024   Ambulatory referral/consult to Psychiatry Once 01/25/2024   Mammo Digital Screening Bilat w/ Jewel Once 04/04/2024   ROCHELLE Screen w/Reflex     CBC Auto Differential     Comprehensive  Metabolic Panel     CBC Auto Differential     Comprehensive Metabolic Panel     C-Reactive Protein         No follow-ups on file.

## 2024-04-19 RX ORDER — METHYLPHENIDATE HYDROCHLORIDE 10 MG/1
10 TABLET ORAL 2 TIMES DAILY
Qty: 180 TABLET | Refills: 0 | Status: SHIPPED | OUTPATIENT
Start: 2024-04-19 | End: 2024-07-21

## 2024-04-22 ENCOUNTER — TELEPHONE (OUTPATIENT)
Dept: NEUROLOGY | Facility: CLINIC | Age: 54
End: 2024-04-22
Payer: COMMERCIAL

## 2024-04-22 PROBLEM — Z00.00 ROUTINE GENERAL MEDICAL EXAMINATION AT A HEALTH CARE FACILITY: Status: RESOLVED | Noted: 2024-01-18 | Resolved: 2024-04-22

## 2024-04-22 NOTE — TELEPHONE ENCOUNTER
LVM informing patient 8/22 NP appointment with Dr. Mcgarry has been canceled. She has been rescheduled to 6/27 1400. Advised patient call back to reschedule.

## 2024-05-05 ENCOUNTER — PATIENT MESSAGE (OUTPATIENT)
Dept: INTERNAL MEDICINE | Facility: CLINIC | Age: 54
End: 2024-05-05
Payer: COMMERCIAL

## 2024-05-08 RX ORDER — PROPRANOLOL HYDROCHLORIDE 10 MG/1
10 TABLET ORAL 3 TIMES DAILY
Qty: 270 TABLET | Refills: 3 | Status: SHIPPED | OUTPATIENT
Start: 2024-05-08 | End: 2024-05-27 | Stop reason: SDUPTHER

## 2024-05-12 DIAGNOSIS — G47.419 PRIMARY NARCOLEPSY WITHOUT CATAPLEXY: ICD-10-CM

## 2024-05-13 RX ORDER — METHYLPHENIDATE HYDROCHLORIDE 18 MG/1
18 TABLET ORAL EVERY MORNING
Qty: 90 TABLET | Refills: 0 | Status: SHIPPED | OUTPATIENT
Start: 2024-05-13 | End: 2024-08-14

## 2024-05-16 ENCOUNTER — HOSPITAL ENCOUNTER (OUTPATIENT)
Dept: RADIOLOGY | Facility: HOSPITAL | Age: 54
Discharge: HOME | End: 2024-05-16
Attending: INTERNAL MEDICINE
Payer: COMMERCIAL

## 2024-05-16 DIAGNOSIS — R63.4 UNINTENDED WEIGHT LOSS: ICD-10-CM

## 2024-05-16 PROCEDURE — 71260 CT THORAX DX C+: CPT | Mod: 26,,, | Performed by: RADIOLOGY

## 2024-05-16 PROCEDURE — 74177 CT ABD & PELVIS W/CONTRAST: CPT | Mod: TC

## 2024-05-16 PROCEDURE — A9698 NON-RAD CONTRAST MATERIALNOC: HCPCS | Performed by: INTERNAL MEDICINE

## 2024-05-16 PROCEDURE — 74177 CT ABD & PELVIS W/CONTRAST: CPT | Mod: 26,,, | Performed by: RADIOLOGY

## 2024-05-16 PROCEDURE — 25500020 PHARM REV CODE 255: Performed by: INTERNAL MEDICINE

## 2024-05-16 RX ADMIN — IOHEXOL 75 ML: 350 INJECTION, SOLUTION INTRAVENOUS at 02:05

## 2024-05-16 RX ADMIN — IOHEXOL 1000 ML: 12 SOLUTION ORAL at 01:05

## 2024-05-28 RX ORDER — PROPRANOLOL HYDROCHLORIDE 10 MG/1
10 TABLET ORAL 3 TIMES DAILY
Qty: 270 TABLET | Refills: 3 | Status: SHIPPED | OUTPATIENT
Start: 2024-05-28 | End: 2025-05-28

## 2024-05-28 NOTE — TELEPHONE ENCOUNTER
No care due was identified.  [facility] Embedded Care Due Messages. Reference number: [id number].   5/27/2024 7:30:20 PM CDT

## 2024-05-30 ENCOUNTER — OFFICE VISIT (OUTPATIENT)
Dept: RHEUMATOLOGY | Facility: CLINIC | Age: 54
End: 2024-05-30
Payer: COMMERCIAL

## 2024-05-30 ENCOUNTER — LAB VISIT (OUTPATIENT)
Dept: LAB | Facility: HOSPITAL | Age: 54
End: 2024-05-30
Attending: STUDENT IN AN ORGANIZED HEALTH CARE EDUCATION/TRAINING PROGRAM
Payer: COMMERCIAL

## 2024-05-30 VITALS
SYSTOLIC BLOOD PRESSURE: 142 MMHG | DIASTOLIC BLOOD PRESSURE: 80 MMHG | WEIGHT: 95 LBS | BODY MASS INDEX: 17.48 KG/M2 | HEIGHT: 62 IN | HEART RATE: 76 BPM

## 2024-05-30 DIAGNOSIS — R79.82 ELEVATED C-REACTIVE PROTEIN (CRP): ICD-10-CM

## 2024-05-30 DIAGNOSIS — R63.4 WEIGHT LOSS: ICD-10-CM

## 2024-05-30 DIAGNOSIS — R79.82 ELEVATED C-REACTIVE PROTEIN (CRP): Primary | ICD-10-CM

## 2024-05-30 LAB
CRP SERPL-MCNC: 0.5 MG/L (ref 0–8.2)
ERYTHROCYTE [SEDIMENTATION RATE] IN BLOOD BY WESTERGREN METHOD: 2 MM/HR (ref 0–20)

## 2024-05-30 PROCEDURE — 99214 OFFICE O/P EST MOD 30 MIN: CPT | Mod: S$GLB,,, | Performed by: STUDENT IN AN ORGANIZED HEALTH CARE EDUCATION/TRAINING PROGRAM

## 2024-05-30 PROCEDURE — 3079F DIAST BP 80-89 MM HG: CPT | Mod: CPTII,S$GLB,, | Performed by: STUDENT IN AN ORGANIZED HEALTH CARE EDUCATION/TRAINING PROGRAM

## 2024-05-30 PROCEDURE — 3077F SYST BP >= 140 MM HG: CPT | Mod: CPTII,S$GLB,, | Performed by: STUDENT IN AN ORGANIZED HEALTH CARE EDUCATION/TRAINING PROGRAM

## 2024-05-30 PROCEDURE — 86140 C-REACTIVE PROTEIN: CPT | Performed by: STUDENT IN AN ORGANIZED HEALTH CARE EDUCATION/TRAINING PROGRAM

## 2024-05-30 PROCEDURE — 86364 TISS TRNSGLTMNASE EA IG CLAS: CPT | Mod: 59 | Performed by: STUDENT IN AN ORGANIZED HEALTH CARE EDUCATION/TRAINING PROGRAM

## 2024-05-30 PROCEDURE — 99999 PR PBB SHADOW E&M-EST. PATIENT-LVL IV: CPT | Mod: PBBFAC,,, | Performed by: STUDENT IN AN ORGANIZED HEALTH CARE EDUCATION/TRAINING PROGRAM

## 2024-05-30 PROCEDURE — 3044F HG A1C LEVEL LT 7.0%: CPT | Mod: CPTII,S$GLB,, | Performed by: STUDENT IN AN ORGANIZED HEALTH CARE EDUCATION/TRAINING PROGRAM

## 2024-05-30 PROCEDURE — 36415 COLL VENOUS BLD VENIPUNCTURE: CPT | Performed by: STUDENT IN AN ORGANIZED HEALTH CARE EDUCATION/TRAINING PROGRAM

## 2024-05-30 PROCEDURE — 3008F BODY MASS INDEX DOCD: CPT | Mod: CPTII,S$GLB,, | Performed by: STUDENT IN AN ORGANIZED HEALTH CARE EDUCATION/TRAINING PROGRAM

## 2024-05-30 PROCEDURE — 86258 DGP ANTIBODY EACH IG CLASS: CPT | Mod: 59 | Performed by: STUDENT IN AN ORGANIZED HEALTH CARE EDUCATION/TRAINING PROGRAM

## 2024-05-30 PROCEDURE — 1160F RVW MEDS BY RX/DR IN RCRD: CPT | Mod: CPTII,S$GLB,, | Performed by: STUDENT IN AN ORGANIZED HEALTH CARE EDUCATION/TRAINING PROGRAM

## 2024-05-30 PROCEDURE — 1159F MED LIST DOCD IN RCRD: CPT | Mod: CPTII,S$GLB,, | Performed by: STUDENT IN AN ORGANIZED HEALTH CARE EDUCATION/TRAINING PROGRAM

## 2024-05-30 PROCEDURE — 85651 RBC SED RATE NONAUTOMATED: CPT | Performed by: STUDENT IN AN ORGANIZED HEALTH CARE EDUCATION/TRAINING PROGRAM
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
## 2024-06-03 LAB
GLIADIN PEPTIDE IGA SER-ACNC: 2.1 U/ML
GLIADIN PEPTIDE IGG SER-ACNC: <0.6 U/ML
IGA SERPL-MCNC: 137 MG/DL (ref 70–400)
TTG IGA SER-ACNC: 0.6 U/ML
TTG IGG SER-ACNC: <0.6 U/ML

## 2024-06-04 ENCOUNTER — PATIENT MESSAGE (OUTPATIENT)
Dept: DERMATOLOGY | Facility: CLINIC | Age: 54
End: 2024-06-04
Payer: COMMERCIAL

## 2024-06-04 DIAGNOSIS — L71.9 ROSACEA: ICD-10-CM

## 2024-06-06 ENCOUNTER — PATIENT MESSAGE (OUTPATIENT)
Dept: NEUROLOGY | Facility: CLINIC | Age: 54
End: 2024-06-06
Payer: COMMERCIAL

## 2024-06-06 ENCOUNTER — HOSPITAL ENCOUNTER (OUTPATIENT)
Dept: RADIOLOGY | Facility: HOSPITAL | Age: 54
Discharge: HOME | End: 2024-06-06
Attending: INTERNAL MEDICINE
Payer: COMMERCIAL

## 2024-06-06 VITALS — HEIGHT: 62 IN | WEIGHT: 95 LBS | BODY MASS INDEX: 17.48 KG/M2

## 2024-06-06 DIAGNOSIS — Z12.31 ENCOUNTER FOR SCREENING MAMMOGRAM FOR BREAST CANCER: ICD-10-CM

## 2024-06-06 PROCEDURE — 77067 SCR MAMMO BI INCL CAD: CPT | Mod: 26,,, | Performed by: RADIOLOGY

## 2024-06-06 PROCEDURE — 77063 BREAST TOMOSYNTHESIS BI: CPT | Mod: 26,,, | Performed by: RADIOLOGY

## 2024-06-06 PROCEDURE — 77067 SCR MAMMO BI INCL CAD: CPT | Mod: TC

## 2024-06-13 DIAGNOSIS — L71.9 ROSACEA: ICD-10-CM

## 2024-06-13 RX ORDER — METRONIDAZOLE 7.5 MG/G
CREAM TOPICAL 2 TIMES DAILY
Qty: 45 G | Refills: 3 | Status: SHIPPED | OUTPATIENT
Start: 2024-06-13 | End: 2024-06-13 | Stop reason: SDUPTHER

## 2024-06-13 RX ORDER — METRONIDAZOLE 7.5 MG/G
CREAM TOPICAL 2 TIMES DAILY
Qty: 45 G | Refills: 3 | OUTPATIENT
Start: 2024-06-13 | End: 2025-06-13

## 2024-06-13 RX ORDER — METRONIDAZOLE 7.5 MG/G
CREAM TOPICAL 2 TIMES DAILY
Qty: 45 G | Refills: 3 | Status: SHIPPED | OUTPATIENT
Start: 2024-06-13 | End: 2025-06-13
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
## 2024-07-11 ENCOUNTER — OFFICE VISIT (OUTPATIENT)
Dept: SLEEP MEDICINE | Facility: CLINIC | Age: 54
End: 2024-07-11
Payer: COMMERCIAL

## 2024-07-11 VITALS
RESPIRATION RATE: 21 BRPM | SYSTOLIC BLOOD PRESSURE: 124 MMHG | DIASTOLIC BLOOD PRESSURE: 82 MMHG | HEIGHT: 62 IN | HEART RATE: 84 BPM | OXYGEN SATURATION: 99 % | WEIGHT: 97.69 LBS | BODY MASS INDEX: 17.98 KG/M2

## 2024-07-11 DIAGNOSIS — G47.419 PRIMARY NARCOLEPSY WITHOUT CATAPLEXY: Primary | ICD-10-CM

## 2024-07-11 DIAGNOSIS — I10 PRIMARY HYPERTENSION: ICD-10-CM

## 2024-07-11 PROCEDURE — 99999 PR PBB SHADOW E&M-EST. PATIENT-LVL V: CPT | Mod: PBBFAC,,, | Performed by: INTERNAL MEDICINE

## 2024-07-11 PROCEDURE — 3044F HG A1C LEVEL LT 7.0%: CPT | Mod: CPTII,S$GLB,, | Performed by: INTERNAL MEDICINE

## 2024-07-11 PROCEDURE — 3008F BODY MASS INDEX DOCD: CPT | Mod: CPTII,S$GLB,, | Performed by: INTERNAL MEDICINE

## 2024-07-11 PROCEDURE — 1159F MED LIST DOCD IN RCRD: CPT | Mod: CPTII,S$GLB,, | Performed by: INTERNAL MEDICINE

## 2024-07-11 PROCEDURE — 99214 OFFICE O/P EST MOD 30 MIN: CPT | Mod: S$GLB,,, | Performed by: INTERNAL MEDICINE

## 2024-07-11 PROCEDURE — 1160F RVW MEDS BY RX/DR IN RCRD: CPT | Mod: CPTII,S$GLB,, | Performed by: INTERNAL MEDICINE

## 2024-07-11 PROCEDURE — 3074F SYST BP LT 130 MM HG: CPT | Mod: CPTII,S$GLB,, | Performed by: INTERNAL MEDICINE

## 2024-07-11 PROCEDURE — 3079F DIAST BP 80-89 MM HG: CPT | Mod: CPTII,S$GLB,, | Performed by: INTERNAL MEDICINE

## 2024-07-11 NOTE — PROGRESS NOTES
Pulmonary Outpatient  Visit     Subjective:       Patient ID: Kayla Lowery is a 54 y.o. female.    Chief Complaint: narcolepsy        Kayla Lowery is 52 y.o.  Physician: [facility]  Dx Narcolepsy followed by Dr Steve GIPSON  Son rosy has narcolepsy  Xywav and methylphenidate  Sleep study was done in 2006:   Displaced here after [place]  Likely had narcolepsy since 12 years, Had ADHD  Since will fall asleep during tests  Seen psychologist and neurologist  Bed time: 9-0930pm  Wake time: 0430 am  SOL 15 mins  No problems with Meds  Notes reviewed  Had tremor  Sleep paralysis and hallucination Last event  2014    Last event: cannot remember  No MVA     Notes reveiwed  1. Primary narcolepsy without cataplexy     PLAN    Kayla was seen today for narcolepsy without cataplexy.    Diagnoses and all orders for this visit:    Primary narcolepsy without cataplexy    1. Do Not Drive if you are sleepy!  2. Will continue xywav at 3.75g and 3 grams at night.Will change the dosing with new rx.   3. Continue methylphenidate 18 mg po daily. And ritalin 10 mg BID Prn.    Electronically signed by Rodo Wilson MD at 09/14/2021 1:42 PM CDT          08/11/2022  Last 04/26/2022  Doing well on Xywav 1 st dose 3.75gm and 2nd dose 3 gm  Wake up 05:30 am  Doing well in day time   Takes concerta in AM  Lasts 6 hrs  methyphenolate x 2 in PM  Inderal for tachy and tremors    12/08/2022  Last visit 08/11/2022  Doing well on Xywav 1 st dose 3.75gm and 2nd dose 3 gm  Wake up 05:30 am  Doing well in day time   Takes concerta in AM  Lasts 6 hrs  methyphenolate x 2 in PM  Inderal for tachy and tremors  Recent left hand #  Consider BRAND CONCERTA  Asked about WAKIX: will interact with PROZAC    05/11/2023  Fopllow up   Narcolepsy  [place]  Doing well on Xywav 1 st dose ( 09:30 pm)  3.75gm and 2nd dose 3 gm ( 1 am)  Wake up 05:30 am  Doing well in day time   No side effect  Propranolol for  tremor  Takes concerta in AM  Lasts 6 hrs  methyphenolate x 2 in PM  Epworth7  CBC and TSH reveiwed  Recently started Plaquenil    11/09/2023   Follow-up appointment  Narcolepsy epworth score  Doing well on meds:  Trouble with weight: /84,  Pulse 73  XYWAV: Doing well on Xywav 1 st dose ( 09:30 pm)  3.75gm and 2nd dose 3 gm ( 1 am)  No DTS: some methylphenidate 10 mg and 18 mg    02/15/2024  Followup   Tolerating therapy protocol without any side effects    No daytime sleepiness   Blood pressure well controlled   Needs refill for her extended-release methylphenidate 18 milligrams   Stable on YXWAV 3.75 gm 1st dose then 3 gm second dose    07/11/2024   Follow-up visit           7/11/2024    10:31 AM   EPWORTH SLEEPINESS SCALE   Sitting and reading 1   Watching TV 1   Sitting, inactive in a public place (e.g. a theatre or a meeting) 1   As a passenger in a car for an hour without a break 1   Lying down to rest in the afternoon when circumstances permit 2   Sitting and talking to someone 0   Sitting quietly after a lunch without alcohol 0   In a car, while stopped for a few minutes in traffic 0   Total score 6                The following portions of the patient's history were reviewed and updated as appropriate:   She  has a past medical history of ADHD (attention deficit hyperactivity disorder), Anxiety, Arthritis, History of abnormal cervical Pap smear (2012), Hypertension, Migraines, Narcolepsy, and Rosacea.  She does not have any pertinent problems on file.  She  has no past surgical history on file.  Her family history includes Alcohol abuse in her father; Arthritis in her sister and sister; Asthma in her son; Bladder Cancer in her maternal grandfather; Coronary artery disease in her mother; Depression in her mother, sister, sister, and son; Diabetes in her maternal aunt and mother; Drug abuse in her father; Hearing loss in her mother; Heart disease in her maternal grandfather, mother, and paternal  grandfather; Hyperlipidemia in her maternal aunt and mother; Hypertension in her maternal grandfather, mother, paternal grandfather, and sister; Learning disabilities in her son; Macular degeneration in her maternal aunt; Migraines in her sister; Miscarriages / Stillbirths in her maternal aunt and mother; Parkinsonism in her mother; Prostate cancer in her paternal grandfather; Stroke in her sister and sister.  She  reports that she has never smoked. She has never used smokeless tobacco. She reports that she does not currently use alcohol after a past usage of about 1.0 standard drink of alcohol per week. She reports that she does not use drugs.  She has a current medication list which includes the following prescription(s): doxycycline hyclate, bijuva, fluconazole, fluoxetine, methylphenidate hcl, methylphenidate hcl, metronidazole 0.75%, omeprazole, ondansetron, propranolol, rizatriptan, tacrolimus, valacyclovir, and xywav.  Current Outpatient Medications on File Prior to Visit   Medication Sig Dispense Refill    doxycycline hyclate 50 mg Tab Take 1 tablet (50 mg) by mouth once daily. Take 1 by mouth everyday with food and water. Avoid lying down for 1 hour after taking. Avoid the sun. 30 tablet 11    estradiol-progesterone (BIJUVA) 1-100 mg Cap Take 1 tablet by mouth every evening. 30 capsule 11    fluconazole (DIFLUCAN) 150 MG Tab Take 1 tablet (150 mg total) by mouth once daily. 1 tablet 4    FLUoxetine 40 MG capsule Take 1 capsule (40 mg total) by mouth once daily. 90 capsule 3    methylphenidate HCl (RITALIN) 10 MG tablet Take 1 tablet (10 mg total) by mouth 2 (two) times daily. 180 tablet 0    methylphenidate HCl 18 MG CR tablet Take 1 tablet (18 mg total) by mouth every morning 90 tablet 0    metronidazole 0.75% (METROCREAM) 0.75 % Crea Apply topically 2 (two) times daily. 45 g 3    omeprazole (PRILOSEC) 40 MG capsule Take 1 capsule (40 mg total) by mouth once daily. 90 capsule 2    ondansetron (ZOFRAN) 8 MG  tablet Take 1 tablet (8 mg total) by mouth every 8 (eight) hours as needed. 30 tablet 0    propranoloL (INDERAL) 10 MG tablet Take 1 tablet (10 mg total) by mouth 3 (three) times daily. 270 tablet 3    rizatriptan (MAXALT-MLT) 10 MG disintegrating tablet Take 1 tablet (10 mg total) by mouth as needed for Migraine. 9 tablet 1    tacrolimus (PROTOPIC) 0.1 % ointment Apply topically 2 (two) times daily. 60 g 2    valACYclovir (VALTREX) 1000 MG tablet Take 2 tablets by mouth twice daily for 2 days as needed first signs of outbreak 30 tablet 3    XYWAV 0.5 gram/mL Soln Take 3.75 g by mouth 2 (two) times a day. 1st dose: @ 8:30 pm, 3 g @ 12 MN       No current facility-administered medications on file prior to visit.     She is allergic to gold sodium thiosulfate, imidazolidinyl urea, codeine phosphate, minocycline, and tobramycin-lotepred..      Review of Systems   Constitutional:  Negative for fatigue.   Respiratory: Negative.  Negative for apnea and snoring.    Psychiatric/Behavioral:  Negative for sleep disturbance.    All other systems reviewed and are negative.      Outpatient Encounter Medications as of 7/11/2024   Medication Sig Dispense Refill    doxycycline hyclate 50 mg Tab Take 1 tablet (50 mg) by mouth once daily. Take 1 by mouth everyday with food and water. Avoid lying down for 1 hour after taking. Avoid the sun. 30 tablet 11    estradiol-progesterone (BIJUVA) 1-100 mg Cap Take 1 tablet by mouth every evening. 30 capsule 11    fluconazole (DIFLUCAN) 150 MG Tab Take 1 tablet (150 mg total) by mouth once daily. 1 tablet 4    FLUoxetine 40 MG capsule Take 1 capsule (40 mg total) by mouth once daily. 90 capsule 3    methylphenidate HCl (RITALIN) 10 MG tablet Take 1 tablet (10 mg total) by mouth 2 (two) times daily. 180 tablet 0    methylphenidate HCl 18 MG CR tablet Take 1 tablet (18 mg total) by mouth every morning 90 tablet 0    metronidazole 0.75% (METROCREAM) 0.75 % Crea Apply topically 2 (two) times daily.  "45 g 3    omeprazole (PRILOSEC) 40 MG capsule Take 1 capsule (40 mg total) by mouth once daily. 90 capsule 2    ondansetron (ZOFRAN) 8 MG tablet Take 1 tablet (8 mg total) by mouth every 8 (eight) hours as needed. 30 tablet 0    propranoloL (INDERAL) 10 MG tablet Take 1 tablet (10 mg total) by mouth 3 (three) times daily. 270 tablet 3    rizatriptan (MAXALT-MLT) 10 MG disintegrating tablet Take 1 tablet (10 mg total) by mouth as needed for Migraine. 9 tablet 1    tacrolimus (PROTOPIC) 0.1 % ointment Apply topically 2 (two) times daily. 60 g 2    valACYclovir (VALTREX) 1000 MG tablet Take 2 tablets by mouth twice daily for 2 days as needed first signs of outbreak 30 tablet 3    XYWAV 0.5 gram/mL Soln Take 3.75 g by mouth 2 (two) times a day. 1st dose: @ 8:30 pm, 3 g @ 12 MN      [DISCONTINUED] metronidazole 0.75% (METROCREAM) 0.75 % Crea Apply topically 2 (two) times daily. 45 g 3     No facility-administered encounter medications on file as of 7/11/2024.       Objective:     Vital Signs (Most Recent)  Vital Signs  Pulse: 84  Resp: (!) 21  SpO2: 99 %  BP: 124/82  Pain Score:   2  Height and Weight  Height: 5' 2" (157.5 cm)  Weight: 44.3 kg (97 lb 10.6 oz)  BSA (Calculated - sq m): 1.39 sq meters  BMI (Calculated): 17.9  Weight in (lb) to have BMI = 25: 136.4]  Wt Readings from Last 2 Encounters:   07/11/24 44.3 kg (97 lb 10.6 oz)   06/06/24 43.1 kg (95 lb 0.3 oz)       Physical Exam   Constitutional: She appears well-developed and well-nourished.   Nursing note and vitals reviewed.      Laboratory  Lab Results   Component Value Date    WBC 3.46 (L) 01/18/2024    RBC 4.67 01/18/2024    HGB 14.4 01/18/2024    HCT 41.3 01/18/2024    MCV 88 01/18/2024    MCH 30.8 01/18/2024    MCHC 34.9 01/18/2024    RDW 12.1 01/18/2024     01/18/2024    MPV 9.1 (L) 01/18/2024    GRAN 3.8 08/01/2023    GRAN 66.4 08/01/2023    LYMPH 1.4 08/01/2023    LYMPH 23.8 08/01/2023    MONO 0.4 08/01/2023    MONO 6.7 08/01/2023    EOS 0.1 " "08/01/2023    BASO 0.04 08/01/2023    EOSINOPHIL 2.2 08/01/2023    BASOPHIL 0.7 08/01/2023       BMP  Lab Results   Component Value Date     01/18/2024    K 3.9 01/18/2024     01/18/2024    CO2 24 01/18/2024    BUN 15 01/18/2024    CREATININE 0.8 01/18/2024    CALCIUM 9.4 01/18/2024    ANIONGAP 12 01/18/2024    ESTGFRAFRICA >60 01/11/2022    EGFRNONAA >60 01/11/2022    AST 19 01/18/2024    ALT 16 01/18/2024    PROT 7.3 01/18/2024       No results found for: "BNP"    Lab Results   Component Value Date    TSH 1.163 01/18/2024       Lab Results   Component Value Date    SEDRATE 2 05/30/2024       Lab Results   Component Value Date    CRP 0.5 05/30/2024     No results found for: "IGE"     No results found for: "ASPERGILLUS"  No results found for: "AFUMIGATUSCL"     No results found for: "ACE"      Assessment/Plan:     Problem List Items Addressed This Visit       Hypertension    Narcolepsy - Primary     [place]: 6  Night : XYWAV 3.73 gm and 3 gm  Day time : CONCERTA, methyphenodate 18 mg on AM and 10 mg in PM  No medication side effects  Tolerating well  Some sleepiness later in day    Discussed strategies  Increase 1st dose XYWAV to 4.0 gm           Stable on current  narcolepsy regimen    will attempt increase 1st dose to 4.0 gm and second dose 3.0 gm     Follow up in about 4 months (around 11/11/2024), or will attempt increase 1st dose to 4.0 gm and second dose 3.0 gm.    This note was prepared using voice recognition system and is likely to have sound alike errors that may have been overlooked even after proof reading.  Please call me with any questions    Discussed diagnosis, its evaluation, treatment and usual course. All questions answered.      Scott Rachel MD    "

## 2024-07-11 NOTE — PROGRESS NOTES
Pulmonary Outpatient  Visit     Subjective:       Patient ID: Kayla Lowery is a 54 y.o. female.    Chief Complaint: narcolepsy        Kayla Lowery is 52 y.o.  Physician: [employer] [facility]  Dx Narcolepsy followed by Dr Steve GIPSON  Son rosy has narcolepsy  Xywav and methylphenidate  Sleep study was done in 2006:   Displaced here after [place]  Likely had narcolepsy since 12 years, Had ADHD  Since will fall asleep during tests  Seen psychologist and neurologist  Bed time: 9-0930pm  Wake time: 0430 am  SOL 15 mins  No problems with Meds  Notes reviewed  Had tremor  Sleep paralysis and hallucination Last event  2014    Last event: cannot remember  No MVA     Notes reveiwed  1. Primary narcolepsy without cataplexy     PLAN    Kayla was seen today for narcolepsy without cataplexy.    Diagnoses and all orders for this visit:    Primary narcolepsy without cataplexy    1. Do Not Drive if you are sleepy!  2. Will continue xywav at 3.75g and 3 grams at night.Will change the dosing with new rx.   3. Continue methylphenidate 18 mg po daily. And ritalin 10 mg BID Prn.    Electronically signed by Rodo Wilson MD at 09/14/2021 1:42 PM CDT          08/11/2022  Last 04/26/2022  Doing well on Xywav 1 st dose 3.75gm and 2nd dose 3 gm  Wake up 05:30 am  Doing well in day time   Takes concerta in AM  Lasts 6 hrs  methyphenolate x 2 in PM  Inderal for tachy and tremors    12/08/2022  Last visit 08/11/2022  Doing well on Xywav 1 st dose 3.75gm and 2nd dose 3 gm  Wake up 05:30 am  Doing well in day time   Takes concerta in AM  Lasts 6 hrs  methyphenolate x 2 in PM  Inderal for tachy and tremors  Recent left hand #  Consider BRAND CONCERTA  Asked about WAKIX: will interact with PROZAC    05/11/2023  Fopllow up   Narcolepsy  [place]  Doing well on Xywav 1 st dose ( 09:30 pm)  3.75gm and 2nd dose 3 gm ( 1 am)  Wake up 05:30 am  Doing well in day time   No side effect  Propranolol for  tremor  Takes concerta in AM  Lasts 6 hrs  methyphenolate x 2 in PM  Epworth7  CBC and TSH reveiwed  Recently started Plaquenil    11/09/2023   Follow-up appointment  Narcolepsy epworth score  Doing well on meds:  Trouble with weight: /84,  Pulse 73  XYWAV: Doing well on Xywav 1 st dose ( 09:30 pm)  3.75gm and 2nd dose 3 gm ( 1 am)  No DTS: some methylphenidate 10 mg and 18 mg    02/15/2024  Followup   Tolerating therapy protocol without any side effects    No daytime sleepiness   Blood pressure well controlled   Needs refill for her extended-release methylphenidate 18 milligrams   Stable on YXWAV 3.75 gm 1st dose then 3 gm second dose    07/11/2024   Follow-up visit           7/11/2024    10:31 AM   EPWORTH SLEEPINESS SCALE   Sitting and reading 1   Watching TV 1   Sitting, inactive in a public place (e.g. a theatre or a meeting) 1   As a passenger in a car for an hour without a break 1   Lying down to rest in the afternoon when circumstances permit 2   Sitting and talking to someone 0   Sitting quietly after a lunch without alcohol 0   In a car, while stopped for a few minutes in traffic 0   Total score 6                The following portions of the patient's history were reviewed and updated as appropriate:   She  has a past medical history of ADHD (attention deficit hyperactivity disorder), Anxiety, Arthritis, History of abnormal cervical Pap smear (2012), Hypertension, Migraines, Narcolepsy, and Rosacea.  She does not have any pertinent problems on file.  She  has no past surgical history on file.  Her family history includes Alcohol abuse in her father; Arthritis in her sister and sister; Asthma in her son; Bladder Cancer in her maternal grandfather; Coronary artery disease in her mother; Depression in her mother, sister, sister, and son; Diabetes in her maternal aunt and mother; Drug abuse in her father; Hearing loss in her mother; Heart disease in her maternal grandfather, mother, and paternal  grandfather; Hyperlipidemia in her maternal aunt and mother; Hypertension in her maternal grandfather, mother, paternal grandfather, and sister; Learning disabilities in her son; Macular degeneration in her maternal aunt; Migraines in her sister; Miscarriages / Stillbirths in her maternal aunt and mother; Parkinsonism in her mother; Prostate cancer in her paternal grandfather; Stroke in her sister and sister.  She  reports that she has never smoked. She has never used smokeless tobacco. She reports that she does not currently use alcohol after a past usage of about 1.0 standard drink of alcohol per week. She reports that she does not use drugs.  She has a current medication list which includes the following prescription(s): doxycycline hyclate, bijuva, fluconazole, fluoxetine, methylphenidate hcl, methylphenidate hcl, metronidazole 0.75%, omeprazole, ondansetron, propranolol, rizatriptan, tacrolimus, valacyclovir, and xywav.  Current Outpatient Medications on File Prior to Visit   Medication Sig Dispense Refill    doxycycline hyclate 50 mg Tab Take 1 tablet (50 mg) by mouth once daily. Take 1 by mouth everyday with food and water. Avoid lying down for 1 hour after taking. Avoid the sun. 30 tablet 11    estradiol-progesterone (BIJUVA) 1-100 mg Cap Take 1 tablet by mouth every evening. 30 capsule 11    fluconazole (DIFLUCAN) 150 MG Tab Take 1 tablet (150 mg total) by mouth once daily. 1 tablet 4    FLUoxetine 40 MG capsule Take 1 capsule (40 mg total) by mouth once daily. 90 capsule 3    methylphenidate HCl (RITALIN) 10 MG tablet Take 1 tablet (10 mg total) by mouth 2 (two) times daily. 180 tablet 0    methylphenidate HCl 18 MG CR tablet Take 1 tablet (18 mg total) by mouth every morning 90 tablet 0    metronidazole 0.75% (METROCREAM) 0.75 % Crea Apply topically 2 (two) times daily. 45 g 3    omeprazole (PRILOSEC) 40 MG capsule Take 1 capsule (40 mg total) by mouth once daily. 90 capsule 2    ondansetron (ZOFRAN) 8 MG  tablet Take 1 tablet (8 mg total) by mouth every 8 (eight) hours as needed. 30 tablet 0    propranoloL (INDERAL) 10 MG tablet Take 1 tablet (10 mg total) by mouth 3 (three) times daily. 270 tablet 3    rizatriptan (MAXALT-MLT) 10 MG disintegrating tablet Take 1 tablet (10 mg total) by mouth as needed for Migraine. 9 tablet 1    tacrolimus (PROTOPIC) 0.1 % ointment Apply topically 2 (two) times daily. 60 g 2    valACYclovir (VALTREX) 1000 MG tablet Take 2 tablets by mouth twice daily for 2 days as needed first signs of outbreak 30 tablet 3    XYWAV 0.5 gram/mL Soln Take 3.75 g by mouth 2 (two) times a day. 1st dose: @ 8:30 pm, 3 g @ 12 MN       No current facility-administered medications on file prior to visit.     She is allergic to gold sodium thiosulfate, imidazolidinyl urea, codeine phosphate, minocycline, and tobramycin-lotepred..      Review of Systems   Constitutional:  Negative for fatigue.   Respiratory: Negative.  Negative for apnea and snoring.    Psychiatric/Behavioral:  Negative for sleep disturbance.    All other systems reviewed and are negative.      Outpatient Encounter Medications as of 7/11/2024   Medication Sig Dispense Refill    doxycycline hyclate 50 mg Tab Take 1 tablet (50 mg) by mouth once daily. Take 1 by mouth everyday with food and water. Avoid lying down for 1 hour after taking. Avoid the sun. 30 tablet 11    estradiol-progesterone (BIJUVA) 1-100 mg Cap Take 1 tablet by mouth every evening. 30 capsule 11    fluconazole (DIFLUCAN) 150 MG Tab Take 1 tablet (150 mg total) by mouth once daily. 1 tablet 4    FLUoxetine 40 MG capsule Take 1 capsule (40 mg total) by mouth once daily. 90 capsule 3    methylphenidate HCl (RITALIN) 10 MG tablet Take 1 tablet (10 mg total) by mouth 2 (two) times daily. 180 tablet 0    methylphenidate HCl 18 MG CR tablet Take 1 tablet (18 mg total) by mouth every morning 90 tablet 0    metronidazole 0.75% (METROCREAM) 0.75 % Crea Apply topically 2 (two) times daily.  "45 g 3    omeprazole (PRILOSEC) 40 MG capsule Take 1 capsule (40 mg total) by mouth once daily. 90 capsule 2    ondansetron (ZOFRAN) 8 MG tablet Take 1 tablet (8 mg total) by mouth every 8 (eight) hours as needed. 30 tablet 0    propranoloL (INDERAL) 10 MG tablet Take 1 tablet (10 mg total) by mouth 3 (three) times daily. 270 tablet 3    rizatriptan (MAXALT-MLT) 10 MG disintegrating tablet Take 1 tablet (10 mg total) by mouth as needed for Migraine. 9 tablet 1    tacrolimus (PROTOPIC) 0.1 % ointment Apply topically 2 (two) times daily. 60 g 2    valACYclovir (VALTREX) 1000 MG tablet Take 2 tablets by mouth twice daily for 2 days as needed first signs of outbreak 30 tablet 3    XYWAV 0.5 gram/mL Soln Take 3.75 g by mouth 2 (two) times a day. 1st dose: @ 8:30 pm, 3 g @ 12 MN      [DISCONTINUED] metronidazole 0.75% (METROCREAM) 0.75 % Crea Apply topically 2 (two) times daily. 45 g 3     No facility-administered encounter medications on file as of 7/11/2024.       Objective:     Vital Signs (Most Recent)  Vital Signs  Pulse: 84  Resp: (!) 21  SpO2: 99 %  BP: 124/82  Pain Score:   2  Height and Weight  Height: 5' 2" (157.5 cm)  Weight: 44.3 kg (97 lb 10.6 oz)  BSA (Calculated - sq m): 1.39 sq meters  BMI (Calculated): 17.9  Weight in (lb) to have BMI = 25: 136.4]  Wt Readings from Last 2 Encounters:   07/11/24 44.3 kg (97 lb 10.6 oz)   06/06/24 43.1 kg (95 lb 0.3 oz)       Physical Exam   Constitutional: She appears well-developed and well-nourished.   Nursing note and vitals reviewed.      Laboratory  Lab Results   Component Value Date    WBC 3.46 (L) 01/18/2024    RBC 4.67 01/18/2024    HGB 14.4 01/18/2024    HCT 41.3 01/18/2024    MCV 88 01/18/2024    MCH 30.8 01/18/2024    MCHC 34.9 01/18/2024    RDW 12.1 01/18/2024     01/18/2024    MPV 9.1 (L) 01/18/2024    GRAN 3.8 08/01/2023    GRAN 66.4 08/01/2023    LYMPH 1.4 08/01/2023    LYMPH 23.8 08/01/2023    MONO 0.4 08/01/2023    MONO 6.7 08/01/2023    EOS 0.1 " "08/01/2023    BASO 0.04 08/01/2023    EOSINOPHIL 2.2 08/01/2023    BASOPHIL 0.7 08/01/2023       BMP  Lab Results   Component Value Date     01/18/2024    K 3.9 01/18/2024     01/18/2024    CO2 24 01/18/2024    BUN 15 01/18/2024    CREATININE 0.8 01/18/2024    CALCIUM 9.4 01/18/2024    ANIONGAP 12 01/18/2024    ESTGFRAFRICA >60 01/11/2022    EGFRNONAA >60 01/11/2022    AST 19 01/18/2024    ALT 16 01/18/2024    PROT 7.3 01/18/2024       No results found for: "BNP"    Lab Results   Component Value Date    TSH 1.163 01/18/2024       Lab Results   Component Value Date    SEDRATE 2 05/30/2024       Lab Results   Component Value Date    CRP 0.5 05/30/2024     No results found for: "IGE"     No results found for: "ASPERGILLUS"  No results found for: "AFUMIGATUSCL"     No results found for: "ACE"      Assessment/Plan:     Problem List Items Addressed This Visit       Hypertension    Narcolepsy - Primary     [place]: 6  Night : XYWAV 3.73 gm and 3 gm  Day time : CONCERTA, methyphenodate 18 mg on AM and 10 mg in PM  No medication side effects  Tolerating well  Some sleepiness later in day    Discussed strategies  Increase 1st dose XYWAV to 4.0 gm           Stable on current  narcolepsy regimen    will attempt increase 1st dose to 4.0 gm and second dose 3.0 gm     Follow up in about 4 months (around 11/11/2024), or will attempt increase 1st dose to 4.0 gm and second dose 3.0 gm.    This note was prepared using voice recognition system and is likely to have sound alike errors that may have been overlooked even after proof reading.  Please call me with any questions    Discussed diagnosis, its evaluation, treatment and usual course. All questions answered.      Scott Rachel MD          "

## 2024-07-11 NOTE — ASSESSMENT & PLAN NOTE
[place]: 6  Night : XYWAV 3.73 gm and 3 gm  Day time : CONCERTA, methyphenodate 18 mg on AM and 10 mg in PM  No medication side effects  Tolerating well  Some sleepiness later in day    Discussed strategies  Increase 1st dose XYWAV to 4.0 gm

## 2024-07-15 DIAGNOSIS — G47.419 PRIMARY NARCOLEPSY WITHOUT CATAPLEXY: ICD-10-CM

## 2024-07-15 DIAGNOSIS — L71.9 ROSACEA: ICD-10-CM

## 2024-07-16 RX ORDER — METHYLPHENIDATE HYDROCHLORIDE 10 MG/1
10 TABLET ORAL 2 TIMES DAILY
Qty: 180 TABLET | Refills: 0 | Status: SHIPPED | OUTPATIENT
Start: 2024-07-16 | End: 2024-10-16

## 2024-07-17 NOTE — TELEPHONE ENCOUNTER
Please see the attached refill request. Pt scheduled for tomorrow, 7/18. Last appt was rescheduled.

## 2024-07-18 ENCOUNTER — PATIENT MESSAGE (OUTPATIENT)
Dept: DERMATOLOGY | Facility: CLINIC | Age: 54
End: 2024-07-18

## 2024-07-18 ENCOUNTER — PATIENT MESSAGE (OUTPATIENT)
Dept: SLEEP MEDICINE | Facility: CLINIC | Age: 54
End: 2024-07-18
Payer: COMMERCIAL

## 2024-07-18 ENCOUNTER — OFFICE VISIT (OUTPATIENT)
Dept: DERMATOLOGY | Facility: CLINIC | Age: 54
End: 2024-07-18
Payer: COMMERCIAL

## 2024-07-18 DIAGNOSIS — L71.9 ROSACEA: Primary | ICD-10-CM

## 2024-07-18 DIAGNOSIS — L23.9 ALLERGIC CONTACT DERMATITIS, UNSPECIFIED TRIGGER: ICD-10-CM

## 2024-07-18 PROCEDURE — 1160F RVW MEDS BY RX/DR IN RCRD: CPT | Mod: CPTII,S$GLB,, | Performed by: DERMATOLOGY

## 2024-07-18 PROCEDURE — 99214 OFFICE O/P EST MOD 30 MIN: CPT | Mod: S$GLB,,, | Performed by: DERMATOLOGY

## 2024-07-18 PROCEDURE — 99999 PR PBB SHADOW E&M-EST. PATIENT-LVL III: CPT | Mod: PBBFAC,,, | Performed by: DERMATOLOGY

## 2024-07-18 PROCEDURE — 3044F HG A1C LEVEL LT 7.0%: CPT | Mod: CPTII,S$GLB,, | Performed by: DERMATOLOGY

## 2024-07-18 PROCEDURE — 1159F MED LIST DOCD IN RCRD: CPT | Mod: CPTII,S$GLB,, | Performed by: DERMATOLOGY

## 2024-07-18 RX ORDER — DOXYCYCLINE HYCLATE 50 MG/1
50 TABLET, FILM COATED ORAL DAILY
Qty: 30 TABLET | Refills: 11 | Status: SHIPPED | OUTPATIENT
Start: 2024-07-18

## 2024-07-18 NOTE — PROGRESS NOTES
Subjective:      Patient ID:  Kayla Lowery is a 54 y.o. female who presents for     Last seen 7/6/23 for f/u rosacea, cont'd rhofade qam (sent OTC at home instructions with afrin; currently using skinmedicinals), metro cream BID, protopic ointment BID prn rash/flares, doxy 50 mg daily.  Reports good control with this regimen. Not using protopic often.  Notes she does get flares if she happens to skin a few days of doxy or if she comes into contact with different things.  Tolerating doxy well.    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     Prior treatments:  Rhofade qam  Soolantra qpm - did not burn, interested in trying again  topical metronidazole- has been years since used  topical clinda - has been years since used  SSS cream samples - reports it burns  Bactrim in the past  minocycline - reports it causes vertigo  oracea samples  Azelaic acid - has been years, interested in trying again  Intermittent short courses of prednisone - help but then flare  plaquenil course started in May 2023 for red swollen joints with rheum, she noted no change in face rash control.        Review of Systems   Gastrointestinal:  Negative for nausea, abdominal pain, diarrhea and indigestion.   Skin:  Positive for daily sunscreen use. Negative for itching and rash.       Objective:   Physical Exam   Constitutional: She appears well-developed and well-nourished. No distress.   Neurological: She is alert and oriented to person, place, and time. She is not disoriented.   Psychiatric: She has a normal mood and affect.   Skin:   Areas Examined (abnormalities noted in diagram):   Head / Face Inspection  Performed            Diagram Legend     Erythematous scaling macule/papule c/w actinic keratosis       Vascular papule c/w angioma      Pigmented verrucoid papule/plaque c/w seborrheic keratosis      Yellow umbilicated papule c/w sebaceous hyperplasia      Irregularly shaped tan macule c/w lentigo     1-2 mm smooth white papules consistent with Milia      Movable subcutaneous cyst with punctum c/w epidermal inclusion cyst      Subcutaneous movable cyst c/w pilar cyst      Firm pink to brown papule c/w dermatofibroma      Pedunculated fleshy papule(s) c/w skin tag(s)      Evenly pigmented macule c/w junctional nevus     Mildly variegated pigmented, slightly irregular-bordered macule c/w mildly atypical nevus      Flesh colored to evenly pigmented papule c/w intradermal nevus       Pink pearly papule/plaque c/w basal cell carcinoma      Erythematous hyperkeratotic cursted plaque c/w SCC      Surgical scar with no sign of skin cancer recurrence      Open and closed comedones      Inflammatory papules and pustules      Verrucoid papule consistent consistent with wart     Erythematous eczematous patches and plaques     Dystrophic onycholytic nail with subungual debris c/w onychomycosis     Umbilicated papule    Erythematous-base heme-crusted tan verrucoid plaque consistent with inflamed seborrheic keratosis     Erythematous Silvery Scaling Plaque c/w Psoriasis     See annotation      Assessment / Plan:          Rosacea  - SC, currently controlled.   - continue current regimen: doxy 50 mg daily, rhofade ([facility]) qam, metro cream BID, protopic ointment BID prn flares  - could consider Seysara, low dose isotretinoin, laser  - continue avoiding allergens; use safe list products (sent updated CAMP list today and also messaged her with her allergan ID codes); daily SPF     - doxycycline: Side effect profile of doxy reviewed including increased sun sensitivity and upset stomach, esophageal inflammation, effect on gut  "health and potential for developing antibiotic resistance.  Patient was instructed to take with food and water and to avoid lying down for 1 hour after taking. Patient was instructed to not become pregnant while on medication to effects on dental development in fetus; she acknowledged understanding of risks involved.            Allergic contact dermatitis- gold sodium thiosulfate  - and irritant reactions noted on initial read to imidazolidinyl urea;  Cl +ME - isothiazolinone, and jac mix  - continue avoiding allergens and continue using "safe list"                Follow up in about 1 year (around 7/18/2025).        LOS NUMBER AND COMPLEXITY OF PROBLEMS    COMPLEXITY OF DATA RISK TOTAL TIME (m)   43563  32021 [] 1 self-limited or minor problem [x] Minimal to none [] No treatment recommended or patient to monitor. Reassurance.  15-29  10-19   35262  39412 Low  [] 2 or more self limited or minor problems  [] 1 stable chronic illness  [] 1 acute, uncomplicated illness or injury Limited (2)  [] Prior external notes from each unique source  [] Review result of each unique test  [] Order each unique test  OR [] Independent historian Low  []  OTC medications   []  Discussed/Decision for minor skin surgery (no risk factors) 30-44  20-29   69624  47918 Moderate  []  1 or more chronic unstable illness (not at goal or progression or exacerbation) or SE of treatment  [x]  2 or more stable chronic illnesses  []  1 acute illness with systemic symptoms  []  1 acute complicated injury  []  1 undiagnosed new problem with uncertain prognosis Moderate (1/3 below)  []  3 or more data items        *Now includes independent historian  []  Independent interpretation of a test  []  Discuss management/test with another provider Moderate  [x]  Prescription drug mgmt  []  Discussed/Decision for Minor surgery with risk factors  []  Mgmt limited by social determinates 45-59  30-39   22751  51713 High  []  1 or more chronic illness with severe " exacerbation, progression or SE of treatment  []  1 acute or chronic illness/injury that poses a threat to life or bodily function Extensive (2/3 below)  []  3 or more data items        *Now includes independent historian.  []  Independent interpretation of a test  []  Discuss management/test with another provider High  []  Major surgery with risk discussed  []  Drug therapy requiring intensive monitoring for toxicity  []  Hospitalization  []  Decision for DNR 60-74  40-54

## 2024-08-08 ENCOUNTER — PATIENT MESSAGE (OUTPATIENT)
Dept: ADMINISTRATIVE | Facility: OTHER | Age: 54
End: 2024-08-08
Payer: COMMERCIAL

## 2024-08-08 ENCOUNTER — PATIENT MESSAGE (OUTPATIENT)
Dept: SLEEP MEDICINE | Facility: CLINIC | Age: 54
End: 2024-08-08
Payer: COMMERCIAL

## 2024-08-09 ENCOUNTER — TELEPHONE (OUTPATIENT)
Dept: PULMONOLOGY | Facility: CLINIC | Age: 54
End: 2024-08-09
Payer: COMMERCIAL

## 2024-08-10 DIAGNOSIS — G47.419 PRIMARY NARCOLEPSY WITHOUT CATAPLEXY: ICD-10-CM

## 2024-08-12 RX ORDER — METHYLPHENIDATE HYDROCHLORIDE 18 MG/1
18 TABLET ORAL EVERY MORNING
Qty: 90 TABLET | Refills: 0 | Status: SHIPPED | OUTPATIENT
Start: 2024-08-12 | End: 2024-11-11

## 2024-08-29 RX ORDER — ONDANSETRON HYDROCHLORIDE 8 MG/1
8 TABLET, FILM COATED ORAL EVERY 8 HOURS PRN
Qty: 30 TABLET | Refills: 0 | Status: SHIPPED | OUTPATIENT
Start: 2024-08-29

## 2024-08-29 NOTE — TELEPHONE ENCOUNTER
No care due was identified.  [facility] Embedded Care Due Messages. Reference number: [id number].   8/29/2024 3:34:14 PM CDT

## 2024-08-29 NOTE — TELEPHONE ENCOUNTER
No care due was identified.  [facility] Embedded Care Due Messages. Reference number: [id number].   8/29/2024 3:33:44 PM CDT

## 2024-08-30 RX ORDER — RIZATRIPTAN BENZOATE 10 MG/1
10 TABLET, ORALLY DISINTEGRATING ORAL
Qty: 27 TABLET | Refills: 2 | Status: SHIPPED | OUTPATIENT
Start: 2024-08-30

## 2024-08-30 NOTE — TELEPHONE ENCOUNTER
Refill Decision Note   Kayla Lowery  is requesting a refill authorization.  Brief Assessment and Rationale for Refill:  Approve     Medication Therapy Plan:         Comments:     Note composed:4:35 PM 08/30/2024

## 2024-09-12 ENCOUNTER — OFFICE VISIT (OUTPATIENT)
Dept: OPHTHALMOLOGY | Facility: CLINIC | Age: 54
End: 2024-09-12
Payer: COMMERCIAL

## 2024-09-12 DIAGNOSIS — H52.13 MYOPIA WITH ASTIGMATISM AND PRESBYOPIA, BILATERAL: ICD-10-CM

## 2024-09-12 DIAGNOSIS — H52.4 MYOPIA WITH ASTIGMATISM AND PRESBYOPIA, BILATERAL: ICD-10-CM

## 2024-09-12 DIAGNOSIS — H04.129 DRY EYE: Primary | ICD-10-CM

## 2024-09-12 DIAGNOSIS — H52.203 MYOPIA WITH ASTIGMATISM AND PRESBYOPIA, BILATERAL: ICD-10-CM

## 2024-09-12 DIAGNOSIS — L71.8 OCULAR ROSACEA: ICD-10-CM

## 2024-09-12 PROCEDURE — 99999 PR PBB SHADOW E&M-EST. PATIENT-LVL III: CPT | Mod: PBBFAC,,, | Performed by: OPTOMETRIST

## 2024-09-12 PROCEDURE — 92015 DETERMINE REFRACTIVE STATE: CPT | Mod: S$GLB,,, | Performed by: OPTOMETRIST

## 2024-09-12 PROCEDURE — 1159F MED LIST DOCD IN RCRD: CPT | Mod: CPTII,S$GLB,, | Performed by: OPTOMETRIST

## 2024-09-12 PROCEDURE — 3044F HG A1C LEVEL LT 7.0%: CPT | Mod: CPTII,S$GLB,, | Performed by: OPTOMETRIST

## 2024-09-12 PROCEDURE — 1160F RVW MEDS BY RX/DR IN RCRD: CPT | Mod: CPTII,S$GLB,, | Performed by: OPTOMETRIST

## 2024-09-12 PROCEDURE — 92014 COMPRE OPH EXAM EST PT 1/>: CPT | Mod: S$GLB,,, | Performed by: OPTOMETRIST

## 2024-09-12 NOTE — PROGRESS NOTES
HPI     Concerns About Ocular Health            Comments: Vision changes since last eye exam?: no     Any eye pain today: no    Other ocular symptoms: irritated from rosacea    Interested in contact lens fitting today? no                     Last edited by Marleny Parsons on 9/12/2024  8:06 AM.            Assessment /Plan     For exam results, see Encounter Report.    Dry eye  Ocular rosacea  Doing well with current regimen of 50mg  po doxy qd and AT   Continue current tx  Monitor 12 months    Myopia with astigmatism and presbyopia, bilateral      Eyeglass Final Rx       Eyeglass Final Rx         Sphere Cylinder Axis    Right -0.50 +0.50 015    Left -2.50 +0.25 165      Type: SVL distance    Expiration Date: 9/12/2025              Eyeglass Final Rx #2         Sphere Cylinder Axis    Right +1.50 +0.50 015    Left -0.50 +0.25 165      Type: SVL reading    Expiration Date: 9/12/2025                    RTC 1 yr for dilated eye exam and gOCT or PRN if any problems.   Discussed above and answered questions.

## 2024-09-30 ENCOUNTER — PATIENT MESSAGE (OUTPATIENT)
Dept: DERMATOLOGY | Facility: CLINIC | Age: 54
End: 2024-09-30
Payer: COMMERCIAL

## 2024-10-17 DIAGNOSIS — G47.419 PRIMARY NARCOLEPSY WITHOUT CATAPLEXY: ICD-10-CM

## 2024-10-21 RX ORDER — METHYLPHENIDATE HYDROCHLORIDE 10 MG/1
10 TABLET ORAL 2 TIMES DAILY
Qty: 180 TABLET | Refills: 0 | Status: SHIPPED | OUTPATIENT
Start: 2024-10-21 | End: 2025-01-22

## 2024-11-06 DIAGNOSIS — G47.419 PRIMARY NARCOLEPSY WITHOUT CATAPLEXY: ICD-10-CM

## 2024-11-06 RX ORDER — METHYLPHENIDATE HYDROCHLORIDE 18 MG/1
18 TABLET ORAL EVERY MORNING
Qty: 90 TABLET | Refills: 0 | Status: SHIPPED | OUTPATIENT
Start: 2024-11-06 | End: 2025-02-06

## 2024-11-13 NOTE — PROGRESS NOTES
Pulmonary Outpatient  Visit     Subjective:       Patient ID: Kayla Lowery is a 54 y.o. female.    Chief Complaint:   Chief Complaint   Patient presents with    Narcolepsy             Kayla Lowery is 52 y.o.  Physician: [facility]  Dx Narcolepsy followed by Dr Steve GIPSON  Mikael gallegos has narcolepsy  Xywav and methylphenidate  Sleep study was done in 2006:   Displaced here after [place]  Likely had narcolepsy since 14 years, Had ADHD  Since will fall asleep during tests  Seen psychologist and neurologist  Bed time: 9-0930pm  Wake time: 0430 am  SOL 15 mins  No problems with Meds  Notes reviewed  Had tremor  Sleep paralysis and hallucination Last event  2014    Last event: cannot remember  No MVA     Notes reveiwed  1. Primary narcolepsy without cataplexy     PLAN    Kayla was seen today for narcolepsy without cataplexy.    Diagnoses and all orders for this visit:    Primary narcolepsy without cataplexy    1. Do Not Drive if you are sleepy!  2. Will continue xywav at 3.75g and 3 grams at night.Will change the dosing with new rx.   3. Continue methylphenidate 18 mg po daily. And ritalin 10 mg BID Prn.    Electronically signed by Rodo Wilson MD at 09/14/2021 1:42 PM CDT          08/11/2022  Last 04/26/2022  Doing well on Xywav 1 st dose 3.75gm and 2nd dose 3 gm  Wake up 05:30 am  Doing well in day time   Takes concerta in AM  Lasts 6 hrs  methyphenolate x 2 in PM  Inderal for tachy and tremors    12/08/2022  Last visit 08/11/2022  Doing well on Xywav 1 st dose 3.75gm and 2nd dose 3 gm  Wake up 05:30 am  Doing well in day time   Takes concerta in AM  Lasts 6 hrs  methyphenolate x 2 in PM  Inderal for tachy and tremors  Recent left hand #  Consider BRAND CONCERTA  Asked about WAKIX: will interact with PROZAC    05/11/2023  Fopllow up   Narcolepsy  [place]y  Doing well on Xywav 1 st dose ( 09:30 pm)  3.75gm and 2nd dose 3 gm ( 1 am)  Wake up 05:30 am  Doing well in day  time   No side effect  Propranolol for tremor  Takes concerta in AM  Lasts 6 hrs  methyphenolate x 2 in PM  Epworth7  CBC and TSH reveiwed  Recently started Plaquenil    11/09/2023   Follow-up appointment  Narcolepsy epworth score  Doing well on meds:  Trouble with weight: /84,  Pulse 73  XYWAV: Doing well on Xywav 1 st dose ( 09:30 pm)  3.75gm and 2nd dose 3 gm ( 1 am)  No DTS: some methylphenidate 10 mg and 18 mg    02/15/2024  Followup   Tolerating therapy protocol without any side effects    No daytime sleepiness   Blood pressure well controlled   Needs refill for her extended-release methylphenidate 18 milligrams   Stable on YXWAV 3.75 gm 1st dose then 3 gm second dose    07/11/2024   Follow-up visit          11/14/2024   Follow-up visit   Doing well on medical maintenance    reviewed   [place] [address]         The following portions of the patient's history were reviewed and updated as appropriate:   She  has a past medical history of ADHD (attention deficit hyperactivity disorder), Anxiety, Arthritis, History of abnormal cervical Pap smear (2012), Hypertension, Migraines, Narcolepsy, and Rosacea.  She does not have any pertinent problems on file.  She  has no past surgical history on file.  Her family history includes Alcohol abuse in her father; Arthritis in her sister and sister; Asthma in her son; Bladder Cancer in her maternal grandfather; Coronary artery disease in her mother; Depression in her mother, sister, sister, and son; Diabetes in her maternal aunt and mother; Drug abuse in her father; Hearing loss in her mother; Heart disease in her maternal grandfather, mother, and paternal grandfather; Hyperlipidemia in her maternal aunt and mother; Hypertension in her maternal grandfather, mother, paternal grandfather, and sister; Learning disabilities in her son; Macular degeneration in her maternal aunt; Migraines in her sister; Miscarriages / Stillbirths in her maternal aunt and mother; Parkinsonism in  her mother; Prostate cancer in her paternal grandfather; Stroke in her sister and sister.  She  reports that she has never smoked. She has never used smokeless tobacco. She reports that she does not currently use alcohol after a past usage of about 1.0 standard drink of alcohol per week. She reports that she does not use drugs.  She has a current medication list which includes the following prescription(s): doxycycline hyclate, bijuva, fluconazole, fluoxetine, methylphenidate hcl, methylphenidate hcl, metronidazole 0.75%, omeprazole, ondansetron, propranolol, rizatriptan, tacrolimus, valacyclovir, and xywav.  Current Outpatient Medications on File Prior to Visit   Medication Sig Dispense Refill    doxycycline hyclate 50 mg Tab Take 1 tablet (50 mg) by mouth once daily. Take 1 by mouth everyday with food and water. Avoid lying down for 1 hour after taking. Avoid the sun. 30 tablet 11    estradiol-progesterone (BIJUVA) 1-100 mg Cap Take 1 tablet by mouth every evening. 30 capsule 11    fluconazole (DIFLUCAN) 150 MG Tab Take 1 tablet (150 mg total) by mouth once daily. 1 tablet 4    FLUoxetine 40 MG capsule Take 1 capsule (40 mg total) by mouth once daily. 90 capsule 3    methylphenidate HCl (RITALIN) 10 MG tablet Take 1 tablet (10 mg total) by mouth 2 (two) times daily. 180 tablet 0    methylphenidate HCl 18 MG CR tablet Take 1 tablet (18 mg total) by mouth every morning 90 tablet 0    metronidazole 0.75% (METROCREAM) 0.75 % Crea Apply topically 2 (two) times daily. 45 g 3    omeprazole (PRILOSEC) 40 MG capsule Take 1 capsule (40 mg total) by mouth once daily. 90 capsule 2    ondansetron (ZOFRAN) 8 MG tablet Take 1 tablet (8 mg total) by mouth every 8 (eight) hours as needed. 30 tablet 0    propranoloL (INDERAL) 10 MG tablet Take 1 tablet (10 mg total) by mouth 3 (three) times daily. 270 tablet 3    rizatriptan (MAXALT-MLT) 10 MG disintegrating tablet Take 1 tablet (10 mg total) by mouth as needed for Migraine. 9  tablet 1    tacrolimus (PROTOPIC) 0.1 % ointment Apply topically 2 (two) times daily. 60 g 2    valACYclovir (VALTREX) 1000 MG tablet Take 2 tablets by mouth twice daily for 2 days as needed first signs of outbreak 30 tablet 3    XYWAV 0.5 gram/mL Soln Take 3.75 g by mouth 2 (two) times a day. 1st dose: @ 8:30 pm, 3 g @ 12 MN       No current facility-administered medications on file prior to visit.     She is allergic to gold sodium thiosulfate, imidazolidinyl urea, codeine phosphate, minocycline, and tobramycin-lotepred..      Review of Systems   Constitutional:  Negative for fatigue.   Respiratory: Negative.  Negative for apnea and snoring.    Psychiatric/Behavioral:  Negative for sleep disturbance.    All other systems reviewed and are negative.      Outpatient Encounter Medications as of 7/11/2024   Medication Sig Dispense Refill    doxycycline hyclate 50 mg Tab Take 1 tablet (50 mg) by mouth once daily. Take 1 by mouth everyday with food and water. Avoid lying down for 1 hour after taking. Avoid the sun. 30 tablet 11    estradiol-progesterone (BIJUVA) 1-100 mg Cap Take 1 tablet by mouth every evening. 30 capsule 11    fluconazole (DIFLUCAN) 150 MG Tab Take 1 tablet (150 mg total) by mouth once daily. 1 tablet 4    FLUoxetine 40 MG capsule Take 1 capsule (40 mg total) by mouth once daily. 90 capsule 3    methylphenidate HCl (RITALIN) 10 MG tablet Take 1 tablet (10 mg total) by mouth 2 (two) times daily. 180 tablet 0    methylphenidate HCl 18 MG CR tablet Take 1 tablet (18 mg total) by mouth every morning 90 tablet 0    metronidazole 0.75% (METROCREAM) 0.75 % Crea Apply topically 2 (two) times daily. 45 g 3    omeprazole (PRILOSEC) 40 MG capsule Take 1 capsule (40 mg total) by mouth once daily. 90 capsule 2    ondansetron (ZOFRAN) 8 MG tablet Take 1 tablet (8 mg total) by mouth every 8 (eight) hours as needed. 30 tablet 0    propranoloL (INDERAL) 10 MG tablet Take 1 tablet (10 mg total) by mouth 3 (three) times  "daily. 270 tablet 3    rizatriptan (MAXALT-MLT) 10 MG disintegrating tablet Take 1 tablet (10 mg total) by mouth as needed for Migraine. 9 tablet 1    tacrolimus (PROTOPIC) 0.1 % ointment Apply topically 2 (two) times daily. 60 g 2    valACYclovir (VALTREX) 1000 MG tablet Take 2 tablets by mouth twice daily for 2 days as needed first signs of outbreak 30 tablet 3    XYWAV 0.5 gram/mL Soln Take 3.75 g by mouth 2 (two) times a day. 1st dose: @ 8:30 pm, 3 g @ 12 MN      [DISCONTINUED] metronidazole 0.75% (METROCREAM) 0.75 % Crea Apply topically 2 (two) times daily. 45 g 3     No facility-administered encounter medications on file as of 7/11/2024.       Objective:     Vital Signs (Most Recent)  Vital Signs  Pulse: 84  Resp: (!) 21  SpO2: 99 %  BP: 124/82  Pain Score:   2  Height and Weight  Height: 5' 2" (157.5 cm)  Weight: 44.3 kg (97 lb 10.6 oz)  BSA (Calculated - sq m): 1.39 sq meters  BMI (Calculated): 17.9  Weight in (lb) to have BMI = 25: 136.4]  Wt Readings from Last 2 Encounters:   07/11/24 44.3 kg (97 lb 10.6 oz)   06/06/24 43.1 kg (95 lb 0.3 oz)       Physical Exam   Constitutional: She appears well-developed and well-nourished.   Nursing note and vitals reviewed.      Laboratory  Lab Results   Component Value Date    WBC 3.46 (L) 01/18/2024    RBC 4.67 01/18/2024    HGB 14.4 01/18/2024    HCT 41.3 01/18/2024    MCV 88 01/18/2024    MCH 30.8 01/18/2024    MCHC 34.9 01/18/2024    RDW 12.1 01/18/2024     01/18/2024    MPV 9.1 (L) 01/18/2024    GRAN 3.8 08/01/2023    GRAN 66.4 08/01/2023    LYMPH 1.4 08/01/2023    LYMPH 23.8 08/01/2023    MONO 0.4 08/01/2023    MONO 6.7 08/01/2023    EOS 0.1 08/01/2023    BASO 0.04 08/01/2023    EOSINOPHIL 2.2 08/01/2023    BASOPHIL 0.7 08/01/2023       BMP  Lab Results   Component Value Date     01/18/2024    K 3.9 01/18/2024     01/18/2024    CO2 24 01/18/2024    BUN 15 01/18/2024    CREATININE 0.8 01/18/2024    CALCIUM 9.4 01/18/2024    ANIONGAP 12 " "01/18/2024    ESTGFRAFRICA >60 01/11/2022    EGFRNONAA >60 01/11/2022    AST 19 01/18/2024    ALT 16 01/18/2024    PROT 7.3 01/18/2024       No results found for: "BNP"    Lab Results   Component Value Date    TSH 1.163 01/18/2024       Lab Results   Component Value Date    SEDRATE 2 05/30/2024       Lab Results   Component Value Date    CRP 0.5 05/30/2024     No results found for: "IGE"     No results found for: "ASPERGILLUS"  No results found for: "AFUMIGATUSCL"     No results found for: "ACE"      Assessment/Plan:          Primary narcolepsy without cataplexy  Assessment & Plan:  [place]: 1  Night : XYWAV 3.73 gm and 3 gm  Day time : CONCERTA, methyphenodate 18 mg on AM and 10 mg in PM  No medication side effects  Tolerating well   reviewed             This note was prepared using voice recognition system and is likely to have sound alike errors that may have been overlooked even after proof reading.  Please call me with any questions    Discussed diagnosis, its evaluation, treatment and usual course. All questions answered.      Scott Rachel MD      "

## 2024-11-14 ENCOUNTER — OFFICE VISIT (OUTPATIENT)
Dept: SLEEP MEDICINE | Facility: CLINIC | Age: 54
End: 2024-11-14
Payer: COMMERCIAL

## 2024-11-14 VITALS
WEIGHT: 98.75 LBS | HEIGHT: 62 IN | HEART RATE: 65 BPM | BODY MASS INDEX: 18.17 KG/M2 | SYSTOLIC BLOOD PRESSURE: 104 MMHG | DIASTOLIC BLOOD PRESSURE: 64 MMHG | RESPIRATION RATE: 21 BRPM | OXYGEN SATURATION: 98 %

## 2024-11-14 DIAGNOSIS — G47.419 PRIMARY NARCOLEPSY WITHOUT CATAPLEXY: Primary | ICD-10-CM

## 2024-11-14 PROCEDURE — 3044F HG A1C LEVEL LT 7.0%: CPT | Mod: CPTII,S$GLB,, | Performed by: INTERNAL MEDICINE

## 2024-11-14 PROCEDURE — 3008F BODY MASS INDEX DOCD: CPT | Mod: CPTII,S$GLB,, | Performed by: INTERNAL MEDICINE

## 2024-11-14 PROCEDURE — 1159F MED LIST DOCD IN RCRD: CPT | Mod: CPTII,S$GLB,, | Performed by: INTERNAL MEDICINE

## 2024-11-14 PROCEDURE — 99999 PR PBB SHADOW E&M-EST. PATIENT-LVL IV: CPT | Mod: PBBFAC,,, | Performed by: INTERNAL MEDICINE

## 2024-11-14 PROCEDURE — 99213 OFFICE O/P EST LOW 20 MIN: CPT | Mod: S$GLB,,, | Performed by: INTERNAL MEDICINE

## 2024-11-14 PROCEDURE — 3074F SYST BP LT 130 MM HG: CPT | Mod: CPTII,S$GLB,, | Performed by: INTERNAL MEDICINE

## 2024-11-14 PROCEDURE — 1160F RVW MEDS BY RX/DR IN RCRD: CPT | Mod: CPTII,S$GLB,, | Performed by: INTERNAL MEDICINE

## 2024-11-14 PROCEDURE — 3078F DIAST BP <80 MM HG: CPT | Mod: CPTII,S$GLB,, | Performed by: INTERNAL MEDICINE

## 2024-11-26 NOTE — ASSESSMENT & PLAN NOTE
11/26/24  Screened by: Trista Benoit    Referring Provider 10 yr recall    Pre- Screening:     There is no height or weight on file to calculate BMI.  Has patient been referred for a routine screening Colonoscopy? yes  Is the patient between 45-75 years old? yes      Previous Colonoscopy yes   If yes:    Date: 10 yrs    Facility:     Reason:           Does the patient want to see a Gastroenterologist prior to their procedure OR are they having any GI symptoms? no    Has the patient been hospitalized or had abdominal surgery in the past 6 months? no    Does the patient use supplemental oxygen? no    Does the patient take Coumadin, Lovenox, Plavix, Elliquis, Xarelto, or other blood thinning medication? no    Has the patient had a stroke, cardiac event, or stent placed in the past year? no      If patient is between 45yrs - 49yrs, please advise patient that we will have to confirm benefits & coverage with their insurance company for a routine screening colonoscopy.     [place]: 1  Night : XYWAV 3.73 gm and 3 gm  Day time : CONCERTA, methyphenodate 18 mg on AM and 10 mg in PM  No medication side effects  Tolerating well   reviewed

## 2024-12-10 DIAGNOSIS — B00.2 RECURRENT ORAL HERPES SIMPLEX: ICD-10-CM

## 2024-12-11 NOTE — ASSESSMENT & PLAN NOTE
[place]: 6  Night : XYWAV  Day time : CONCERTA, methyphenodate 18 mg on AM and 10 mg in PM   Pain #7/10 to her neck, right knee and lower back.  All of them comes/goes depending on  what she is doing. Taking 3 Tramadol per day and has 12 tablets left, and takes 6, 300mg Gabapentin per day.

## 2024-12-12 RX ORDER — VALACYCLOVIR HYDROCHLORIDE 1 G/1
TABLET, FILM COATED ORAL
Qty: 30 TABLET | Refills: 3 | Status: SHIPPED | OUTPATIENT
Start: 2024-12-12

## 2025-01-02 DIAGNOSIS — G47.419 PRIMARY NARCOLEPSY WITHOUT CATAPLEXY: Primary | ICD-10-CM

## 2025-01-02 NOTE — TELEPHONE ENCOUNTER
----- Message from Melophone sent at 1/2/2025  7:33 AM CST -----  Contact: [facility], [place]  ..Type:  RX Refill Request    Who Called: Marquita   Refill or New Rx:Refill   RX Name and Strength: XYWAV 0.5 gram/mL Soln  How is the patient currently taking it? (ex. 1XDay):  Is this a 30 day or 90 day RX:  Preferred Pharmacy with phone number:..  [facility] - [address] [address]  Phone: [phone] Fax: [phone]  Local or Mail Order:mail   Ordering Provider:Jorge   Would the patient rather a call back or a response via [facility]? Call back   Best Call Back Number:.[phone] (home)   Additional Information:

## 2025-01-03 RX ORDER — (CALCIUM, MAGNESIUM, POTASSIUM, AND SODIUM OXYBATES) .5; .5; .5; .5 G/ML; G/ML; G/ML; G/ML
3.75 SOLUTION ORAL 2 TIMES DAILY
Qty: 180 ML | Refills: 5 | Status: SHIPPED | OUTPATIENT
Start: 2025-01-03

## 2025-01-06 ENCOUNTER — PATIENT MESSAGE (OUTPATIENT)
Dept: SLEEP MEDICINE | Facility: CLINIC | Age: 55
End: 2025-01-06
Payer: COMMERCIAL

## 2025-01-08 ENCOUNTER — PATIENT MESSAGE (OUTPATIENT)
Dept: ADMINISTRATIVE | Facility: OTHER | Age: 55
End: 2025-01-08
Payer: COMMERCIAL

## 2025-01-16 DIAGNOSIS — G47.419 PRIMARY NARCOLEPSY WITHOUT CATAPLEXY: ICD-10-CM

## 2025-01-17 ENCOUNTER — PATIENT MESSAGE (OUTPATIENT)
Dept: SLEEP MEDICINE | Facility: CLINIC | Age: 55
End: 2025-01-17
Payer: COMMERCIAL

## 2025-01-17 RX ORDER — METHYLPHENIDATE HYDROCHLORIDE 10 MG/1
10 TABLET ORAL 2 TIMES DAILY
Qty: 180 TABLET | Refills: 0 | Status: SHIPPED | OUTPATIENT
Start: 2025-01-17 | End: 2025-04-17

## 2025-02-03 DIAGNOSIS — G47.419 PRIMARY NARCOLEPSY WITHOUT CATAPLEXY: ICD-10-CM

## 2025-02-03 RX ORDER — METHYLPHENIDATE HYDROCHLORIDE 18 MG/1
18 TABLET ORAL EVERY MORNING
Qty: 90 TABLET | Refills: 0 | Status: SHIPPED | OUTPATIENT
Start: 2025-02-03 | End: 2025-05-07

## 2025-02-16 ENCOUNTER — PATIENT MESSAGE (OUTPATIENT)
Dept: SLEEP MEDICINE | Facility: CLINIC | Age: 55
End: 2025-02-16
Payer: COMMERCIAL

## 2025-02-17 DIAGNOSIS — G47.419 PRIMARY NARCOLEPSY WITHOUT CATAPLEXY: ICD-10-CM

## 2025-02-17 RX ORDER — METHYLPHENIDATE HYDROCHLORIDE 10 MG/1
10 TABLET ORAL 2 TIMES DAILY
Qty: 180 TABLET | Refills: 0 | Status: SHIPPED | OUTPATIENT
Start: 2025-02-17 | End: 2025-05-21

## 2025-02-26 DIAGNOSIS — F41.9 ANXIETY: ICD-10-CM

## 2025-02-26 RX ORDER — FLUOXETINE HYDROCHLORIDE 40 MG/1
40 CAPSULE ORAL DAILY
Qty: 90 CAPSULE | Refills: 0 | Status: SHIPPED | OUTPATIENT
Start: 2025-02-26 | End: 2025-05-27

## 2025-02-26 NOTE — TELEPHONE ENCOUNTER
Refill Decision Note   Kayla Lowery  is requesting a refill authorization.  Brief Assessment and Rationale for Refill:  Approve     Medication Therapy Plan:         Comments:     Note composed:10:39 AM 02/26/2025

## 2025-02-26 NOTE — TELEPHONE ENCOUNTER
No care due was identified.  [facility] Embedded Care Due Messages. Reference number: [id number].   2/26/2025 7:03:41 AM CST

## 2025-03-07 ENCOUNTER — PATIENT MESSAGE (OUTPATIENT)
Dept: SLEEP MEDICINE | Facility: CLINIC | Age: 55
End: 2025-03-07
Payer: COMMERCIAL

## 2025-03-13 ENCOUNTER — PATIENT MESSAGE (OUTPATIENT)
Dept: RHEUMATOLOGY | Facility: CLINIC | Age: 55
End: 2025-03-13
Payer: COMMERCIAL

## 2025-03-20 ENCOUNTER — OFFICE VISIT (OUTPATIENT)
Dept: SLEEP MEDICINE | Facility: CLINIC | Age: 55
End: 2025-03-20
Payer: COMMERCIAL

## 2025-03-20 VITALS — HEIGHT: 62 IN | BODY MASS INDEX: 18.4 KG/M2 | WEIGHT: 100 LBS

## 2025-03-20 DIAGNOSIS — I10 PRIMARY HYPERTENSION: ICD-10-CM

## 2025-03-20 DIAGNOSIS — G47.419 PRIMARY NARCOLEPSY WITHOUT CATAPLEXY: Primary | ICD-10-CM

## 2025-03-20 NOTE — ASSESSMENT & PLAN NOTE
3/20/2025   EPWORTH SLEEPINESS SCALE   Sitting and reading 1   Watching TV 1   Sitting, inactive in a public place (e.g. a theatre or a meeting) 1   As a passenger in a car for an hour without a break 2   Lying down to rest in the afternoon when circumstances permit 2   Sitting and talking to someone 0   Sitting quietly after a lunch without alcohol 1   In a car, while stopped for a few minutes in traffic 0   Total score 8        Take 3.75 g by mouth 2 (two) times a day. 1st dose: @ 8:30 pm, 3 g @ 12 MN - Oral         methyphenodate 18 mg on AM and 10 mg in PM  No medication side effects  Tolerating well   reviewed

## 2025-03-20 NOTE — PROGRESS NOTES
The patient location is: [facility]  The chief complaint leading to consultation is:   Chief Complaint   Patient presents with    narcolepsy        Visit type: audiovisual    Face to Face time with patient: 6 mins  30 minutes of total time spent on the encounter, which includes face to face time and non-face to face time preparing to see the patient (eg, review of tests), Obtaining and/or reviewing separately obtained history, Documenting clinical information in the electronic or other health record, Independently interpreting results (not separately reported) and communicating results to the patient/family/caregiver, or Care coordination (not separately reported).         Each patient to whom he or she provides medical services by telemedicine is:  (1) informed of the relationship between the physician and patient and the respective role of any other health care provider with respect to management of the patient; and (2) notified that he or she may decline to receive medical services by telemedicine and may withdraw from such care at any time.    Notes:                                           Pulmonary Outpatient  Visit     Subjective:       Patient ID: Kayla Lowery is a 54 y.o. female.    Chief Complaint:   Chief Complaint   Patient presents with    narcolepsy            Kayla Lowery is 52 y.o.  Physician: [facility]  Dx Narcolepsy followed by Dr Steve GIPSON  Mikael gallegos has narcolepsy  Xywav and methylphenidate  Sleep study was done in 2006:   Displaced here after [place]  Likely had narcolepsy since 12 years, Had ADHD  Since will fall asleep during tests  Seen psychologist and neurologist  Bed time: 9-0930pm  Wake time: 0430 am  SOL 15 mins  No problems with Meds  Notes reviewed  Had tremor  Sleep paralysis and hallucination Last event  2014    Last event: cannot remember  No MVA     Notes reveiwed  1. Primary narcolepsy without cataplexy     PLAN    Kayla was seen today for narcolepsy without  cataplexy.    Diagnoses and all orders for this visit:    Primary narcolepsy without cataplexy    1. Do Not Drive if you are sleepy!  2. Will continue xywav at 3.75g and 3 grams at night.Will change the dosing with new rx.   3. Continue methylphenidate 18 mg po daily. And ritalin 10 mg BID Prn.    Electronically signed by Rodo Wilson MD at 09/14/2021 1:42 PM CDT          08/11/2022  Last 04/26/2022  Doing well on Xywav 1 st dose 3.75gm and 2nd dose 3 gm  Wake up 05:30 am  Doing well in day time   Takes concerta in AM  Lasts 6 hrs  methyphenolate x 2 in PM  Inderal for tachy and tremors    12/08/2022  Last visit 08/11/2022  Doing well on Xywav 1 st dose 3.75gm and 2nd dose 3 gm  Wake up 05:30 am  Doing well in day time   Takes concerta in AM  Lasts 6 hrs  methyphenolate x 2 in PM  Inderal for tachy and tremors  Recent left hand #  Consider BRAND CONCERTA  Asked about WAKIX: will interact with [facility]SK[facility]    05/11/2023  Fopllow up   Narcolepsy  [place]  Doing well on Xywav 1 st dose ( 09:30 pm)  3.75gm and 2nd dose 3 gm ( 1 am)  Wake up 05:30 am  Doing well in day time   No side effect  Propranolol for tremor  Takes concerta in AM  Lasts 6 hrs  methyphenolate x 2 in PM  Epworth7  CBC and TSH reveiwed  Recently started Plaquenil    11/09/2023   Follow-up appointment  Narcolepsy epworth score  Doing well on meds:  Trouble with weight: /84,  Pulse 73  XYWAV: Doing well on Xywav 1 st dose ( 09:30 pm)  3.75gm and 2nd dose 3 gm ( 1 am)  No DTS: some methylphenidate 10 mg and 18 mg    02/15/2024  Followup   Tolerating therapy protocol without any side effects    No daytime sleepiness   Blood pressure well controlled   Needs refill for her extended-release methylphenidate 18 milligrams   Stable on YXWAV 3.75 gm 1st dose then 3 gm second dose    07/11/2024   Follow-up visit          11/14/2024   Follow-up visit   Doing well on medical maintenance    reviewed   [place] 1     03/20/2025  Doing well   reviewed  Bed  time: 9: 30 pm  Wake time: 5 am  Regime: Methylphenidate 18 mg  Xywav stable 2 doses, takes lower 2nd dose            3/20/2025   EPWORTH SLEEPINESS SCALE   Sitting and reading 1   Watching TV 1   Sitting, inactive in a public place (e.g. a theatre or a meeting) 1   As a passenger in a car for an hour without a break 2   Lying down to rest in the afternoon when circumstances permit 2   Sitting and talking to someone 0   Sitting quietly after a lunch without alcohol 1   In a car, while stopped for a few minutes in traffic 0   Total score 8             The following portions of the patient's history were reviewed and updated as appropriate:   She  has a past medical history of ADHD (attention deficit hyperactivity disorder), Anxiety, Arthritis, History of abnormal cervical Pap smear (2012), Hypertension, Migraines, Narcolepsy, and Rosacea.  She does not have any pertinent problems on file.  She  has no past surgical history on file.  Her family history includes Alcohol abuse in her father; Arthritis in her sister and sister; Asthma in her son; Bladder Cancer in her maternal grandfather; Coronary artery disease in her mother; Depression in her mother, sister, sister, and son; Diabetes in her maternal aunt and mother; Drug abuse in her father; Hearing loss in her mother; Heart disease in her maternal grandfather, mother, and paternal grandfather; Hyperlipidemia in her maternal aunt and mother; Hypertension in her maternal grandfather, mother, paternal grandfather, and sister; Learning disabilities in her son; Macular degeneration in her maternal aunt; Migraines in her sister; Miscarriages / Stillbirths in her maternal aunt and mother; Parkinsonism in her mother; Prostate cancer in her paternal grandfather; Stroke in her sister and sister.  She  reports that she has never smoked. She has never used smokeless tobacco. She reports that she does not currently use alcohol after a past usage of about 1.0 standard drink of  alcohol per week. She reports that she does not use drugs.  She has a current medication list which includes the following prescription(s): doxycycline hyclate, bijuva, fluconazole, fluoxetine, methylphenidate hcl, methylphenidate hcl, metronidazole 0.75%, omeprazole, ondansetron, propranolol, rizatriptan, tacrolimus, valacyclovir, and xywav.  Current Outpatient Medications on File Prior to Visit   Medication Sig Dispense Refill    doxycycline hyclate 50 mg Tab Take 1 tablet (50 mg) by mouth once daily. Take 1 by mouth everyday with food and water. Avoid lying down for 1 hour after taking. Avoid the sun. 30 tablet 11    estradiol-progesterone (BIJUVA) 1-100 mg Cap Take 1 tablet by mouth every evening. 30 capsule 11    fluconazole (DIFLUCAN) 150 MG Tab Take 1 tablet (150 mg total) by mouth once daily. 1 tablet 4    FLUoxetine 40 MG capsule Take 1 capsule (40 mg total) by mouth once daily. 90 capsule 3    methylphenidate HCl (RITALIN) 10 MG tablet Take 1 tablet (10 mg total) by mouth 2 (two) times daily. 180 tablet 0    methylphenidate HCl 18 MG CR tablet Take 1 tablet (18 mg total) by mouth every morning 90 tablet 0    metronidazole 0.75% (METROCREAM) 0.75 % Crea Apply topically 2 (two) times daily. 45 g 3    omeprazole (PRILOSEC) 40 MG capsule Take 1 capsule (40 mg total) by mouth once daily. 90 capsule 2    ondansetron (ZOFRAN) 8 MG tablet Take 1 tablet (8 mg total) by mouth every 8 (eight) hours as needed. 30 tablet 0    propranoloL (INDERAL) 10 MG tablet Take 1 tablet (10 mg total) by mouth 3 (three) times daily. 270 tablet 3    rizatriptan (MAXALT-MLT) 10 MG disintegrating tablet Take 1 tablet (10 mg total) by mouth as needed for Migraine. 9 tablet 1    tacrolimus (PROTOPIC) 0.1 % ointment Apply topically 2 (two) times daily. 60 g 2    valACYclovir (VALTREX) 1000 MG tablet Take 2 tablets by mouth twice daily for 2 days as needed first signs of outbreak 30 tablet 3    XYWAV 0.5 gram/mL Soln Take 3.75 g by mouth 2  (two) times a day. 1st dose: @ 8:30 pm, 3 g @ 12 MN       No current facility-administered medications on file prior to visit.     She is allergic to gold sodium thiosulfate, imidazolidinyl urea, codeine phosphate, minocycline, and tobramycin-lotepred..      Review of Systems   Constitutional:  Negative for fatigue.   Respiratory: Negative.  Negative for apnea and snoring.    Psychiatric/Behavioral:  Negative for sleep disturbance.    All other systems reviewed and are negative.      Outpatient Encounter Medications as of 7/11/2024   Medication Sig Dispense Refill    doxycycline hyclate 50 mg Tab Take 1 tablet (50 mg) by mouth once daily. Take 1 by mouth everyday with food and water. Avoid lying down for 1 hour after taking. Avoid the sun. 30 tablet 11    estradiol-progesterone (BIJUVA) 1-100 mg Cap Take 1 tablet by mouth every evening. 30 capsule 11    fluconazole (DIFLUCAN) 150 MG Tab Take 1 tablet (150 mg total) by mouth once daily. 1 tablet 4    FLUoxetine 40 MG capsule Take 1 capsule (40 mg total) by mouth once daily. 90 capsule 3    methylphenidate HCl (RITALIN) 10 MG tablet Take 1 tablet (10 mg total) by mouth 2 (two) times daily. 180 tablet 0    methylphenidate HCl 18 MG CR tablet Take 1 tablet (18 mg total) by mouth every morning 90 tablet 0    metronidazole 0.75% (METROCREAM) 0.75 % Crea Apply topically 2 (two) times daily. 45 g 3    omeprazole (PRILOSEC) 40 MG capsule Take 1 capsule (40 mg total) by mouth once daily. 90 capsule 2    ondansetron (ZOFRAN) 8 MG tablet Take 1 tablet (8 mg total) by mouth every 8 (eight) hours as needed. 30 tablet 0    propranoloL (INDERAL) 10 MG tablet Take 1 tablet (10 mg total) by mouth 3 (three) times daily. 270 tablet 3    rizatriptan (MAXALT-MLT) 10 MG disintegrating tablet Take 1 tablet (10 mg total) by mouth as needed for Migraine. 9 tablet 1    tacrolimus (PROTOPIC) 0.1 % ointment Apply topically 2 (two) times daily. 60 g 2    valACYclovir (VALTREX) 1000 MG tablet  "Take 2 tablets by mouth twice daily for 2 days as needed first signs of outbreak 30 tablet 3    XYWAV 0.5 gram/mL Soln Take 3.75 g by mouth 2 (two) times a day. 1st dose: @ 8:30 pm, 3 g @ 12 MN      [DISCONTINUED] metronidazole 0.75% (METROCREAM) 0.75 % Crea Apply topically 2 (two) times daily. 45 g 3     No facility-administered encounter medications on file as of 7/11/2024.       Objective:     Vital Signs (Most Recent)        Vitals:    03/20/25 1123   Weight: 45.4 kg (100 lb)   Height: 5' 2" (1.575 m)           Physical Exam  Vitals and nursing note reviewed.   HENT:      Head: Normocephalic and atraumatic.   Neurological:      Mental Status: She is alert and oriented to person, place, and time.             Laboratory  Lab Results   Component Value Date    WBC 3.46 (L) 01/18/2024    RBC 4.67 01/18/2024    HGB 14.4 01/18/2024    HCT 41.3 01/18/2024    MCV 88 01/18/2024    MCH 30.8 01/18/2024    MCHC 34.9 01/18/2024    RDW 12.1 01/18/2024     01/18/2024    MPV 9.1 (L) 01/18/2024    GRAN 3.8 08/01/2023    GRAN 66.4 08/01/2023    LYMPH 1.4 08/01/2023    LYMPH 23.8 08/01/2023    MONO 0.4 08/01/2023    MONO 6.7 08/01/2023    EOS 0.1 08/01/2023    BASO 0.04 08/01/2023    EOSINOPHIL 2.2 08/01/2023    BASOPHIL 0.7 08/01/2023       BMP  Lab Results   Component Value Date     01/18/2024    K 3.9 01/18/2024     01/18/2024    CO2 24 01/18/2024    BUN 15 01/18/2024    CREATININE 0.8 01/18/2024    CALCIUM 9.4 01/18/2024    ANIONGAP 12 01/18/2024    ESTGFRAFRICA >60 01/11/2022    EGFRNONAA >60 01/11/2022    AST 19 01/18/2024    ALT 16 01/18/2024    PROT 7.3 01/18/2024       No results found for: "BNP"    Lab Results   Component Value Date    TSH 1.163 01/18/2024       Lab Results   Component Value Date    SEDRATE 2 05/30/2024       Lab Results   Component Value Date    CRP 0.5 05/30/2024     No results found for: "IGE"     No results found for: "ASPERGILLUS"  No results found for: "AFUMIGATUSCL"     No " "results found for: "ACE"      Assessment/Plan:          Primary hypertension    Primary narcolepsy without cataplexy  Assessment & Plan:      3/20/2025   EPWORTH SLEEPINESS SCALE   Sitting and reading 1   Watching TV 1   Sitting, inactive in a public place (e.g. a theatre or a meeting) 1   As a passenger in a car for an hour without a break 2   Lying down to rest in the afternoon when circumstances permit 2   Sitting and talking to someone 0   Sitting quietly after a lunch without alcohol 1   In a car, while stopped for a few minutes in traffic 0   Total score 8        Take 3.75 g by mouth 2 (two) times a day. 1st dose: @ 8:30 pm, 3 g @ 12 MN - Oral         methyphenodate 18 mg on AM and 10 mg in PM  No medication side effects  Tolerating well   reviewed               This note was prepared using voice recognition system and is likely to have sound alike errors that may have been overlooked even after proof reading.  Please call me with any questions    Discussed diagnosis, its evaluation, treatment and usual course. All questions answered.      Scott Rachel MD           "

## 2025-04-11 ENCOUNTER — TELEPHONE (OUTPATIENT)
Dept: HEMATOLOGY/ONCOLOGY | Facility: CLINIC | Age: 55
End: 2025-04-11
Payer: COMMERCIAL

## 2025-04-11 DIAGNOSIS — F41.9 ANXIETY: Primary | ICD-10-CM

## 2025-04-24 ENCOUNTER — PATIENT MESSAGE (OUTPATIENT)
Dept: SLEEP MEDICINE | Facility: CLINIC | Age: 55
End: 2025-04-24
Payer: COMMERCIAL

## 2025-04-29 ENCOUNTER — TELEPHONE (OUTPATIENT)
Dept: PULMONOLOGY | Facility: CLINIC | Age: 55
End: 2025-04-29
Payer: COMMERCIAL

## 2025-04-29 NOTE — TELEPHONE ENCOUNTER
----- Message from Gina sent at 4/29/2025  9:49 AM CDT -----  Contact: [phone]  [email or web address] Patient is calling for Medical Advice regarding:[facility] with [facility] How long has patient had these symptoms:Pharmacy name and phone#:Patient wants a call back or thru [facility]:call back Comments:They received a form for the prescription and the ROSITA number is not on the form Fax [phone] advise patient replies from provider may take up to 48 hours.

## 2025-04-29 NOTE — TELEPHONE ENCOUNTER
Spoke with pharmacy. Provided missing ROSITA number. Called pt insurance to see what was needed for appeal. They are faxing me documents to fill out and send back for appeal to get medication approved.

## 2025-05-01 DIAGNOSIS — G47.419 PRIMARY NARCOLEPSY WITHOUT CATAPLEXY: ICD-10-CM

## 2025-05-01 RX ORDER — METHYLPHENIDATE HYDROCHLORIDE 18 MG/1
18 TABLET ORAL EVERY MORNING
Qty: 90 TABLET | Refills: 0 | Status: SHIPPED | OUTPATIENT
Start: 2025-05-01 | End: 2025-07-31

## 2025-05-07 ENCOUNTER — OFFICE VISIT (OUTPATIENT)
Dept: DERMATOLOGY | Facility: CLINIC | Age: 55
End: 2025-05-07
Payer: COMMERCIAL

## 2025-05-07 VITALS — HEIGHT: 62 IN | WEIGHT: 100.06 LBS | BODY MASS INDEX: 18.41 KG/M2

## 2025-05-07 DIAGNOSIS — L71.9 ROSACEA: ICD-10-CM

## 2025-05-07 PROCEDURE — G2211 COMPLEX E/M VISIT ADD ON: HCPCS | Mod: S$GLB,,, | Performed by: STUDENT IN AN ORGANIZED HEALTH CARE EDUCATION/TRAINING PROGRAM

## 2025-05-07 PROCEDURE — 3008F BODY MASS INDEX DOCD: CPT | Mod: CPTII,S$GLB,, | Performed by: STUDENT IN AN ORGANIZED HEALTH CARE EDUCATION/TRAINING PROGRAM

## 2025-05-07 PROCEDURE — 99213 OFFICE O/P EST LOW 20 MIN: CPT | Mod: S$GLB,,, | Performed by: STUDENT IN AN ORGANIZED HEALTH CARE EDUCATION/TRAINING PROGRAM

## 2025-05-07 PROCEDURE — 1159F MED LIST DOCD IN RCRD: CPT | Mod: CPTII,S$GLB,, | Performed by: STUDENT IN AN ORGANIZED HEALTH CARE EDUCATION/TRAINING PROGRAM

## 2025-05-07 PROCEDURE — 99999 PR PBB SHADOW E&M-EST. PATIENT-LVL III: CPT | Mod: PBBFAC,,, | Performed by: STUDENT IN AN ORGANIZED HEALTH CARE EDUCATION/TRAINING PROGRAM

## 2025-05-07 PROCEDURE — 1160F RVW MEDS BY RX/DR IN RCRD: CPT | Mod: CPTII,S$GLB,, | Performed by: STUDENT IN AN ORGANIZED HEALTH CARE EDUCATION/TRAINING PROGRAM

## 2025-05-07 RX ORDER — METRONIDAZOLE 7.5 MG/G
CREAM TOPICAL 2 TIMES DAILY
Qty: 45 G | Refills: 3 | Status: SHIPPED | OUTPATIENT
Start: 2025-05-07 | End: 2026-05-07

## 2025-05-07 RX ORDER — DOXYCYCLINE HYCLATE 50 MG/1
50 TABLET, FILM COATED ORAL DAILY
Qty: 90 TABLET | Refills: 2 | Status: SHIPPED | OUTPATIENT
Start: 2025-05-07

## 2025-05-07 RX ORDER — TACROLIMUS 1 MG/G
OINTMENT TOPICAL 2 TIMES DAILY
Qty: 60 G | Refills: 3 | Status: SHIPPED | OUTPATIENT
Start: 2025-05-07

## 2025-05-07 NOTE — PROGRESS NOTES
Subjective:       Patient ID:  Kayla Lowery is a 55 y.o. female who presents for   Chief Complaint   Patient presents with    Skin Discoloration     C/o skin discoloration on both cheeks, worse on right, x 2 months, rates pain 6/10     History of Present Illness: The patient presents for follow up of rosacea.     Last seen 7/18/24  by Dr. Isaac. Currently on metro cream BID, protopic ointment as needed, doxy 50 mg daily, and oxymetazoline: 1%/Ivermectin: 1%/Niacinamide: 2% combination cream. Reports good control with this regimen. Recently a few months ago had a flare of symptoms on the cheeks after going to a hotel room and didn't change out pillow cases. Since then, symptoms have subsided back to baseline. Overall, regimen keeps symptoms controlled.     February 2022 patch test positive for gold sodium thiosulfate, with irritant reaction to imidazolidinyl urea;  Cl +ME - isothiazolinone, and jac mix.     Skin Discoloration        Review of Systems   Constitutional:  Negative for fever and chills.   Skin:  Negative for itching, rash and dry skin.        Objective:    Physical Exam   Constitutional: She appears well-developed and well-nourished. No distress.   Neurological: She is alert and oriented to person, place, and time. She is not disoriented.   Psychiatric: She has a normal mood and affect.   Skin:   Areas Examined (abnormalities noted in diagram):   Head / Face Inspection Performed  Neck Inspection Performed              Diagram Legend     Erythematous scaling macule/papule c/w actinic keratosis       Vascular papule c/w angioma      Pigmented verrucoid papule/plaque c/w seborrheic keratosis      Yellow umbilicated papule c/w sebaceous hyperplasia      Irregularly shaped tan macule c/w lentigo     1-2 mm smooth white papules consistent with Milia      Movable subcutaneous cyst with punctum c/w epidermal inclusion cyst      Subcutaneous movable cyst c/w pilar cyst      Firm pink to brown papule c/w  dermatofibroma      Pedunculated fleshy papule(s) c/w skin tag(s)      Evenly pigmented macule c/w junctional nevus     Mildly variegated pigmented, slightly irregular-bordered macule c/w mildly atypical nevus      Flesh colored to evenly pigmented papule c/w intradermal nevus       Pink pearly papule/plaque c/w basal cell carcinoma      Erythematous hyperkeratotic cursted plaque c/w SCC      Surgical scar with no sign of skin cancer recurrence      Open and closed comedones      Inflammatory papules and pustules      Verrucoid papule consistent consistent with wart     Erythematous eczematous patches and plaques     Dystrophic onycholytic nail with subungual debris c/w onychomycosis     Umbilicated papule    Erythematous-base heme-crusted tan verrucoid plaque consistent with inflamed seborrheic keratosis     Erythematous Silvery Scaling Plaque c/w Psoriasis     See annotation      Assessment / Plan:        Rosacea - previously in a flare, but now symptoms back to baseline. Will continue with current regimen. Monitor closely for any triggers or flares of symptoms.   -     metronidazole 0.75% (METROCREAM) 0.75 % Crea; Apply topically 2 (two) times daily.  Dispense: 45 g; Refill: 3  -     doxycycline hyclate 50 mg Tab; Take 1 tablet (50 mg) by mouth once daily. Take 1 by mouth everyday with food and water. Avoid lying down for 1 hour after taking. Avoid the sun.  Dispense: 90 tablet; Refill: 2  -     tacrolimus (PROTOPIC) 0.1 % ointment; Apply topically 2 (two) times daily.  Dispense: 60 g; Refill: 3  -     Continue Oxymetazoline: 1%/Ivermectin: 1%/Niacinamide: 2% combination cream (sent via [employer]).              Follow up in about 1 year (around 5/7/2026).

## 2025-05-08 ENCOUNTER — PATIENT MESSAGE (OUTPATIENT)
Dept: SLEEP MEDICINE | Facility: CLINIC | Age: 55
End: 2025-05-08
Payer: COMMERCIAL

## 2025-05-19 DIAGNOSIS — G47.419 PRIMARY NARCOLEPSY WITHOUT CATAPLEXY: ICD-10-CM

## 2025-05-21 RX ORDER — METHYLPHENIDATE HYDROCHLORIDE 10 MG/1
10 TABLET ORAL 2 TIMES DAILY
Qty: 180 TABLET | Refills: 0 | Status: SHIPPED | OUTPATIENT
Start: 2025-05-21 | End: 2025-08-21

## 2025-06-02 DIAGNOSIS — F41.9 ANXIETY: ICD-10-CM

## 2025-06-03 RX ORDER — FLUOXETINE HYDROCHLORIDE 40 MG/1
40 CAPSULE ORAL DAILY
Qty: 90 CAPSULE | Refills: 0 | Status: SHIPPED | OUTPATIENT
Start: 2025-06-03

## 2025-06-03 RX ORDER — OMEPRAZOLE 40 MG/1
40 CAPSULE, DELAYED RELEASE ORAL DAILY
Qty: 90 CAPSULE | Refills: 0 | Status: SHIPPED | OUTPATIENT
Start: 2025-06-03

## 2025-06-03 RX ORDER — PROPRANOLOL HYDROCHLORIDE 10 MG/1
10 TABLET ORAL 3 TIMES DAILY
Qty: 270 TABLET | Refills: 0 | Status: SHIPPED | OUTPATIENT
Start: 2025-06-03

## 2025-06-05 ENCOUNTER — PATIENT MESSAGE (OUTPATIENT)
Dept: INTERNAL MEDICINE | Facility: CLINIC | Age: 55
End: 2025-06-05
Payer: COMMERCIAL

## 2025-06-10 ENCOUNTER — OFFICE VISIT (OUTPATIENT)
Dept: INTERNAL MEDICINE | Facility: CLINIC | Age: 55
End: 2025-06-10
Payer: COMMERCIAL

## 2025-06-10 VITALS
HEART RATE: 63 BPM | WEIGHT: 104.25 LBS | SYSTOLIC BLOOD PRESSURE: 136 MMHG | HEIGHT: 62 IN | TEMPERATURE: 98 F | BODY MASS INDEX: 19.19 KG/M2 | DIASTOLIC BLOOD PRESSURE: 84 MMHG | OXYGEN SATURATION: 99 %

## 2025-06-10 DIAGNOSIS — G44.84 EXERTIONAL HEADACHE: Primary | ICD-10-CM

## 2025-06-10 PROCEDURE — 99999 PR PBB SHADOW E&M-EST. PATIENT-LVL III: CPT | Mod: PBBFAC,,, | Performed by: INTERNAL MEDICINE

## 2025-06-10 PROCEDURE — 3079F DIAST BP 80-89 MM HG: CPT | Mod: CPTII,S$GLB,, | Performed by: INTERNAL MEDICINE

## 2025-06-10 PROCEDURE — 3075F SYST BP GE 130 - 139MM HG: CPT | Mod: CPTII,S$GLB,, | Performed by: INTERNAL MEDICINE

## 2025-06-10 PROCEDURE — 3008F BODY MASS INDEX DOCD: CPT | Mod: CPTII,S$GLB,, | Performed by: INTERNAL MEDICINE

## 2025-06-10 PROCEDURE — 99213 OFFICE O/P EST LOW 20 MIN: CPT | Mod: S$GLB,,, | Performed by: INTERNAL MEDICINE

## 2025-06-10 NOTE — PROGRESS NOTES
"Subjective:      Patient ID: Kayla Lowery is a 55 y.o. female.    Chief Complaint: Headache    Headache   This is a new problem. Pertinent negatives include no abdominal pain, coughing, ear pain, fever, seizures or sore throat.     History of Present Illness               54 yo with Problem List[1]  Past Medical History:   Diagnosis Date    ADHD (attention deficit hyperactivity disorder)     Anxiety     Arthritis     History of abnormal cervical Pap smear 2012    Hypertension     Migraines     Narcolepsy     Rosacea      C/o   Exertional headache x 1 , during intercourse. Near climax.  Frontal doni. Burning. Severe for a couple of minutes. Dull for at least 20 more min then fell asleep.   Took ibu.   No repeat episodes.     Does yoga without pain.         Review of Systems   Constitutional:  Negative for chills and fever.   HENT:  Negative for ear pain and sore throat.    Respiratory:  Negative for cough.    Cardiovascular:  Negative for chest pain.   Gastrointestinal:  Negative for abdominal pain and blood in stool.   Genitourinary:  Negative for dysuria and hematuria.   Neurological:  Positive for headaches. Negative for seizures and syncope.     Objective:   /84   Pulse 63   Temp 97.6 °F (36.4 °C)   Ht 5' 2" (1.575 m)   Wt 47.3 kg (104 lb 4.4 oz)   SpO2 99%   BMI 19.07 kg/m²     Physical Exam  Constitutional:       General: She is not in acute distress.     Appearance: She is well-developed.   HENT:      Head: Normocephalic and atraumatic.   Eyes:      Extraocular Movements: Extraocular movements intact.   Neck:      Thyroid: No thyromegaly.   Cardiovascular:      Rate and Rhythm: Normal rate and regular rhythm.   Pulmonary:      Breath sounds: Normal breath sounds. No wheezing or rales.   Abdominal:      General: Bowel sounds are normal.      Palpations: Abdomen is soft.      Tenderness: There is no abdominal tenderness.   Musculoskeletal:         General: No swelling.      Cervical back: Neck " supple. No rigidity.   Lymphadenopathy:      Cervical: No cervical adenopathy.   Skin:     General: Skin is warm and dry.   Neurological:      Mental Status: She is alert and oriented to person, place, and time.   Psychiatric:         Behavior: Behavior normal.         Lab Results   Component Value Date    WBC 3.46 (L) 01/18/2024    HGB 14.4 01/18/2024    HGB 14.3 08/01/2023    HGB 13.6 01/05/2023    HCT 41.3 01/18/2024    MCV 88 01/18/2024    MCV 90 08/01/2023    MCV 88 01/05/2023     01/18/2024    CHOL 134 01/18/2024    TRIG 92 01/18/2024    HDL 56 01/18/2024    LDLCALC 59.6 (L) 01/18/2024    LDLCALC 86.0 01/05/2023    LDLCALC 91.8 01/11/2022    ALT 16 01/18/2024    AST 19 01/18/2024     01/18/2024    K 3.9 01/18/2024    CALCIUM 9.4 01/18/2024     01/18/2024    CO2 24 01/18/2024    BUN 15 01/18/2024    CREATININE 0.8 01/18/2024    CREATININE 0.8 08/01/2023    CREATININE 0.7 01/05/2023    EGFRNORACEVR >60 01/18/2024    EGFRNORACEVR >60 08/01/2023    EGFRNORACEVR >60 01/05/2023    TSH 1.163 01/18/2024    TSH 0.960 01/05/2023    TSH 1.470 01/11/2022     (H) 01/18/2024    HGBA1C 5.1 01/18/2024    HGBA1C 5.2 01/05/2023    HGBA1C 5.2 01/11/2022          The 10-year ASCVD risk score (Douglas JO, et al., 2019) is: 2.5%    Values used to calculate the score:      Age: 55 years      Sex: Female      Is Non- : No      Diabetic: No      Tobacco smoker: No      Systolic Blood Pressure: 136 mmHg      Is BP treated: Yes      HDL Cholesterol: 56 mg/dL      Total Cholesterol: 134 mg/dL     Assessment:     1. Exertional headache      Plan:   1. Exertional headache      No alarm features.   Monitor   Further w/u / treatment if recurrence.   There are no Patient Instructions on file for this visit.    Future Appointments   Date Time Provider Department Center   6/30/2025 11:00 AM Ansley Isaac MD [facility]   7/24/2025 10:40 AM Scott Rachel MD [facility]    11/20/2025 10:00 AM Sergio Peacock MD [facility] [place]       Lab Frequency Next Occurrence   ROCHELLE Screen w/Reflex         No follow-ups on file.                  [1]   Patient Active Problem List  Diagnosis    Narcolepsy    Hypertension    Pain    Decreased range of motion of finger of left hand    Decreased activities of daily living (ADL)    Decreased  strength of left hand    Anxiety    Seronegative arthritis

## 2025-06-23 ENCOUNTER — DOCUMENTATION ONLY (OUTPATIENT)
Dept: HEMATOLOGY/ONCOLOGY | Facility: CLINIC | Age: 55
End: 2025-06-23
Payer: COMMERCIAL

## 2025-06-23 NOTE — PROGRESS NOTES
"Pharmacogenomics (PGx) Consult     Chief Complaint: Kayla Lowery  has undergone pharmacogenomic testing via self-enrollment in the [employer] [facility] PGx . Patient completed sample collection on 5/29/2025 and results were provided on 6/10/2025.     Test results: Pharmacogenomic results can be found in GENOMIC INDICATORS    Past Medical History:  -------------------------------------    ADHD (attention deficit hyperactivity disorder)    Anxiety    Arthritis    History of abnormal cervical Pap smear    Hypertension    Migraines    Narcolepsy    Rosacea        Allergies:   Review of patient's allergies indicates:   Allergen Reactions    Gold sodium thiosulfate Dermatitis, Edema, Itching, Rash and Swelling    Imidazolidinyl urea Dermatitis, Itching, Rash and Swelling    Codeine phosphate     Minocycline Other (See Comments)     dizziness    Tobramycin-lotepred     Lido-prilo jac pack [lidocaine-prilocaine] Rash     Only topical        Medications: Current Medications[1]      Clinical Recommendations based on PGx  The patient is not currently on any medications that need adjustment based on pharmacogenomic results.      -Patient has 0 PGx results that can impact future medication selection- please see "Clinically Actionable PGx Results" for full list of medication interactions    Clinically Actionable PGx Results   *Note, actionable recommendations may change based on updates to existing PGx literature. For the most up to date medication recommendations based on patients' results, please view the GENOMIC INDICATORS module in Epic.    -If there is an interaction with any future medications and the patient's genetic results, a clinical alert will fire for both providers and pharmacists. These interactions are reviewed by the PGx team and updated as new data becomes available.      -PGx information can be accessed by patients within [facility]- results with "actionable result DETECTED" should be communicated to providers " outside of [facility] as our clinical alerts will NOT fire    If you have any additional questions, please don't hesitate to reach out to me or contact [phone] (GENE).    Aleyda Gonsales, PharmD  Clinical Pharmacogenomics Pharmacist                 [1]   doxycycline hyclate 50 mg Tab    estradiol-progesterone (BIJUVA) 1-100 mg Cap    fluconazole (DIFLUCAN) 150 MG Tab    FLUoxetine 40 MG capsule    methylphenidate HCl (RITALIN) 10 MG tablet    methylphenidate HCl 18 MG CR tablet    metronidazole 0.75% (METROCREAM) 0.75 % Crea    omeprazole (PRILOSEC) 40 MG capsule    ondansetron (ZOFRAN) 8 MG tablet    propranoloL (INDERAL) 10 MG tablet    rizatriptan (MAXALT-MLT) 10 MG disintegrating tablet    tacrolimus (PROTOPIC) 0.1 % ointment    tacrolimus (PROTOPIC) 0.1 % ointment    valACYclovir (VALTREX) 1000 MG tablet    XYWAV 0.5 gram/mL Soln

## 2025-06-26 ENCOUNTER — HOSPITAL ENCOUNTER (OUTPATIENT)
Dept: RADIOLOGY | Facility: HOSPITAL | Age: 55
Discharge: HOME | End: 2025-06-26
Attending: INTERNAL MEDICINE
Payer: COMMERCIAL

## 2025-06-26 VITALS — HEIGHT: 62 IN | WEIGHT: 104.25 LBS | BODY MASS INDEX: 19.19 KG/M2

## 2025-06-26 DIAGNOSIS — Z12.31 ENCOUNTER FOR SCREENING MAMMOGRAM FOR BREAST CANCER: ICD-10-CM

## 2025-06-26 PROCEDURE — 77067 SCR MAMMO BI INCL CAD: CPT | Mod: 26,,, | Performed by: RADIOLOGY

## 2025-06-26 PROCEDURE — 77063 BREAST TOMOSYNTHESIS BI: CPT | Mod: 26,,, | Performed by: RADIOLOGY

## 2025-06-26 PROCEDURE — 77063 BREAST TOMOSYNTHESIS BI: CPT | Mod: TC

## 2025-07-01 ENCOUNTER — PATIENT MESSAGE (OUTPATIENT)
Dept: ADMINISTRATIVE | Facility: HOSPITAL | Age: 55
End: 2025-07-01
Payer: COMMERCIAL

## 2025-07-01 ENCOUNTER — PATIENT MESSAGE (OUTPATIENT)
Dept: ADMINISTRATIVE | Facility: OTHER | Age: 55
End: 2025-07-01
Payer: COMMERCIAL

## 2025-07-01 ENCOUNTER — PATIENT MESSAGE (OUTPATIENT)
Dept: DERMATOLOGY | Facility: CLINIC | Age: 55
End: 2025-07-01
Payer: COMMERCIAL

## 2025-07-03 RX ORDER — PREDNISONE 10 MG/1
TABLET ORAL
Qty: 18 TABLET | Refills: 0 | Status: SHIPPED | OUTPATIENT
Start: 2025-07-03

## 2025-07-03 NOTE — TELEPHONE ENCOUNTER
It could be the sunscreen, but it's likely just a flare for whatever reason. I did send over a short course of oral steroid prednisone taper to take to calm things down. Once symptoms are better, she can try the sunscreen again and if symptoms recur, then we know for sure it's the sunscreen. But start the prednisone today.

## 2025-07-24 ENCOUNTER — OFFICE VISIT (OUTPATIENT)
Dept: SLEEP MEDICINE | Facility: CLINIC | Age: 55
End: 2025-07-24
Payer: COMMERCIAL

## 2025-07-24 DIAGNOSIS — G47.419 PRIMARY NARCOLEPSY WITHOUT CATAPLEXY: Primary | ICD-10-CM

## 2025-07-24 DIAGNOSIS — I10 PRIMARY HYPERTENSION: ICD-10-CM

## 2025-07-24 PROCEDURE — 1159F MED LIST DOCD IN RCRD: CPT | Mod: CPTII,95,, | Performed by: INTERNAL MEDICINE

## 2025-07-24 PROCEDURE — 1160F RVW MEDS BY RX/DR IN RCRD: CPT | Mod: CPTII,95,, | Performed by: INTERNAL MEDICINE

## 2025-07-24 PROCEDURE — 98006 SYNCH AUDIO-VIDEO EST MOD 30: CPT | Mod: 95,,, | Performed by: INTERNAL MEDICINE

## 2025-07-24 NOTE — PROGRESS NOTES
The patient location is: [facility]  The chief complaint leading to consultation is:   Chief Complaint   Patient presents with    Primary narcolepsy without cataplexy        Visit type: audiovisual    Face to Face time with patient: 10 mins  30 minutes of total time spent on the encounter, which includes face to face time and non-face to face time preparing to see the patient (eg, review of tests), Obtaining and/or reviewing separately obtained history, Documenting clinical information in the electronic or other health record, Independently interpreting results (not separately reported) and communicating results to the patient/family/caregiver, or Care coordination (not separately reported).         Each patient to whom he or she provides medical services by telemedicine is:  (1) informed of the relationship between the physician and patient and the respective role of any other health care provider with respect to management of the patient; and (2) notified that he or she may decline to receive medical services by telemedicine and may withdraw from such care at any time.    Notes:                                           Pulmonary Outpatient  Visit     Subjective:       Patient ID: Kayla Lowery is a 54 y.o. female.    Chief Complaint:   Chief Complaint   Patient presents with    Primary narcolepsy without cataplexy            Kayla Lowery is 52 y.o.  Physician: [facility]  Dx Narcolepsy followed by Dr Steve GIPSON  Mikael foyo has narcolepsy  Xywav and methylphenidate  Sleep study was done in 2006:   Displaced here after [facility]  Likely had narcolepsy since 14 years, Had ADHD  Since will fall asleep during tests  Seen psychologist and neurologist  Bed time: 9-0930pm  Wake time: 0430 am  SOL 15 mins  No problems with Meds  Notes reviewed  Had tremor  Sleep paralysis and hallucination Last event  2014    Last event: cannot remember  No MVA     Notes reveiwed  1. Primary narcolepsy without cataplexy      PLAN    Kayla was seen today for narcolepsy without cataplexy.    Diagnoses and all orders for this visit:    Primary narcolepsy without cataplexy    1. Do Not Drive if you are sleepy!  2. Will continue xywav at 3.75g and 3 grams at night.Will change the dosing with new rx.   3. Continue methylphenidate 18 mg po daily. And ritalin 10 mg BID Prn.    Electronically signed by Rodo Wilson MD at 09/14/2021 1:42 PM CDT          08/11/2022  Last 04/26/2022  Doing well on Xywav 1 st dose 3.75gm and 2nd dose 3 gm  Wake up 05:30 am  Doing well in day time   Takes concerta in AM  Lasts 6 hrs  methyphenolate x 2 in PM  Inderal for tachy and tremors    12/08/2022  Last visit 08/11/2022  Doing well on Xywav 1 st dose 3.75gm and 2nd dose 3 gm  Wake up 05:30 am  Doing well in day time   Takes concerta in AM  Lasts 6 hrs  methyphenolate x 2 in PM  Inderal for tachy and tremors  Recent left hand #  Consider BRAND CONCERTA  Asked about WAKIX: will interact with [email or web address]    05/11/2023  Fopllow up   Narcolepsy  [place]  Doing well on Xywav 1 st dose ( 09:30 pm)  3.75gm and 2nd dose 3 gm ( 1 am)  Wake up 05:30 am  Doing well in day time   No side effect  Propranolol for tremor  Takes concerta in AM  Lasts 6 hrs  methyphenolate x 2 in PM  Epworth7  CBC and TSH reveiwed  Recently started Plaquenil    11/09/2023   Follow-up appointment  Narcolepsy epworth score  Doing well on meds:  Trouble with weight: /84,  Pulse 73  XYWAV: Doing well on Xywav 1 st dose ( 09:30 pm)  3.75gm and 2nd dose 3 gm ( 1 am)  No DTS: some methylphenidate 10 mg and 18 mg    02/15/2024  Followup   Tolerating therapy protocol without any side effects    No daytime sleepiness   Blood pressure well controlled   Needs refill for her extended-release methylphenidate 18 milligrams   Stable on YXWAV 3.75 gm 1st dose then 3 gm second dose    07/11/2024   Follow-up visit         11/14/2024   Follow-up visit   Doing well on medical maintenance    reviewed    [place] 1     03/20/2025  Doing well   reviewed  Bed time: 9: 30 pm  Wake time: 5 am  Regime: Methylphenidate 18 mg  Xywav stable 2 doses, takes lower 2nd dose       07/24/2025  Doing well   reviewed  Xyvav July last fill  Bed time: 9: 30 pm  Wake time: 5 am  Regime: Methylphenidate 18 mg  Concerta 18 mg  2nd dose 11 am and 4 pm  Xywav stable 2 doses, takes lower 2nd dose        3/20/2025   EPWORTH SLEEPINESS SCALE   Sitting and reading 1   Watching TV 1   Sitting, inactive in a public place (e.g. a theatre or a meeting) 1   As a passenger in a car for an hour without a break 2   Lying down to rest in the afternoon when circumstances permit 2   Sitting and talking to someone 0   Sitting quietly after a lunch without alcohol 1   In a car, while stopped for a few minutes in traffic 0   Total score 8               The following portions of the patient's history were reviewed and updated as appropriate:   She  has a past medical history of ADHD (attention deficit hyperactivity disorder), Anxiety, Arthritis, History of abnormal cervical Pap smear (2012), Hypertension, Migraines, Narcolepsy, and Rosacea.  She does not have any pertinent problems on file.  She  has no past surgical history on file.  Her family history includes Alcohol abuse in her father; Arthritis in her sister and sister; Asthma in her son; Bladder Cancer in her maternal grandfather; Coronary artery disease in her mother; Depression in her mother, sister, sister, and son; Diabetes in her maternal aunt and mother; Drug abuse in her father; Hearing loss in her mother; Heart disease in her maternal grandfather, mother, and paternal grandfather; Hyperlipidemia in her maternal aunt and mother; Hypertension in her maternal grandfather, mother, paternal grandfather, and sister; Learning disabilities in her son; Macular degeneration in her maternal aunt; Migraines in her sister; Miscarriages / Stillbirths in her maternal aunt and mother; Parkinsonism in  her mother; Prostate cancer in her paternal grandfather; Stroke in her sister and sister.  She  reports that she has never smoked. She has never used smokeless tobacco. She reports that she does not currently use alcohol after a past usage of about 1.0 standard drink of alcohol per week. She reports that she does not use drugs.  She has a current medication list which includes the following prescription(s): doxycycline hyclate, bijuva, fluconazole, fluoxetine, methylphenidate hcl, methylphenidate hcl, metronidazole 0.75%, omeprazole, ondansetron, propranolol, rizatriptan, tacrolimus, valacyclovir, and xywav.  Current Outpatient Medications on File Prior to Visit   Medication Sig Dispense Refill    doxycycline hyclate 50 mg Tab Take 1 tablet (50 mg) by mouth once daily. Take 1 by mouth everyday with food and water. Avoid lying down for 1 hour after taking. Avoid the sun. 30 tablet 11    estradiol-progesterone (BIJUVA) 1-100 mg Cap Take 1 tablet by mouth every evening. 30 capsule 11    fluconazole (DIFLUCAN) 150 MG Tab Take 1 tablet (150 mg total) by mouth once daily. 1 tablet 4    FLUoxetine 40 MG capsule Take 1 capsule (40 mg total) by mouth once daily. 90 capsule 3    methylphenidate HCl (RITALIN) 10 MG tablet Take 1 tablet (10 mg total) by mouth 2 (two) times daily. 180 tablet 0    methylphenidate HCl 18 MG CR tablet Take 1 tablet (18 mg total) by mouth every morning 90 tablet 0    metronidazole 0.75% (METROCREAM) 0.75 % Crea Apply topically 2 (two) times daily. 45 g 3    omeprazole (PRILOSEC) 40 MG capsule Take 1 capsule (40 mg total) by mouth once daily. 90 capsule 2    ondansetron (ZOFRAN) 8 MG tablet Take 1 tablet (8 mg total) by mouth every 8 (eight) hours as needed. 30 tablet 0    propranoloL (INDERAL) 10 MG tablet Take 1 tablet (10 mg total) by mouth 3 (three) times daily. 270 tablet 3    rizatriptan (MAXALT-MLT) 10 MG disintegrating tablet Take 1 tablet (10 mg total) by mouth as needed for Migraine. 9  tablet 1    tacrolimus (PROTOPIC) 0.1 % ointment Apply topically 2 (two) times daily. 60 g 2    valACYclovir (VALTREX) 1000 MG tablet Take 2 tablets by mouth twice daily for 2 days as needed first signs of outbreak 30 tablet 3    XYWAV 0.5 gram/mL Soln Take 3.75 g by mouth 2 (two) times a day. 1st dose: @ 8:30 pm, 3 g @ 12 MN       No current facility-administered medications on file prior to visit.     She is allergic to gold sodium thiosulfate, imidazolidinyl urea, codeine phosphate, minocycline, and tobramycin-lotepred..      Review of Systems   Constitutional:  Negative for fatigue.   Respiratory: Negative.  Negative for apnea and snoring.    Psychiatric/Behavioral:  Negative for sleep disturbance.    All other systems reviewed and are negative.      Outpatient Encounter Medications as of 7/11/2024   Medication Sig Dispense Refill    doxycycline hyclate 50 mg Tab Take 1 tablet (50 mg) by mouth once daily. Take 1 by mouth everyday with food and water. Avoid lying down for 1 hour after taking. Avoid the sun. 30 tablet 11    estradiol-progesterone (BIJUVA) 1-100 mg Cap Take 1 tablet by mouth every evening. 30 capsule 11    fluconazole (DIFLUCAN) 150 MG Tab Take 1 tablet (150 mg total) by mouth once daily. 1 tablet 4    FLUoxetine 40 MG capsule Take 1 capsule (40 mg total) by mouth once daily. 90 capsule 3    methylphenidate HCl (RITALIN) 10 MG tablet Take 1 tablet (10 mg total) by mouth 2 (two) times daily. 180 tablet 0    methylphenidate HCl 18 MG CR tablet Take 1 tablet (18 mg total) by mouth every morning 90 tablet 0    metronidazole 0.75% (METROCREAM) 0.75 % Crea Apply topically 2 (two) times daily. 45 g 3    omeprazole (PRILOSEC) 40 MG capsule Take 1 capsule (40 mg total) by mouth once daily. 90 capsule 2    ondansetron (ZOFRAN) 8 MG tablet Take 1 tablet (8 mg total) by mouth every 8 (eight) hours as needed. 30 tablet 0    propranoloL (INDERAL) 10 MG tablet Take 1 tablet (10 mg total) by mouth 3 (three) times  "daily. 270 tablet 3    rizatriptan (MAXALT-MLT) 10 MG disintegrating tablet Take 1 tablet (10 mg total) by mouth as needed for Migraine. 9 tablet 1    tacrolimus (PROTOPIC) 0.1 % ointment Apply topically 2 (two) times daily. 60 g 2    valACYclovir (VALTREX) 1000 MG tablet Take 2 tablets by mouth twice daily for 2 days as needed first signs of outbreak 30 tablet 3    XYWAV 0.5 gram/mL Soln Take 3.75 g by mouth 2 (two) times a day. 1st dose: @ 8:30 pm, 3 g @ 12 MN      [DISCONTINUED] metronidazole 0.75% (METROCREAM) 0.75 % Crea Apply topically 2 (two) times daily. 45 g 3     No facility-administered encounter medications on file as of 7/11/2024.       Objective:     Vital Signs (Most Recent)        There were no vitals filed for this visit.          Physical Exam  Vitals and nursing note reviewed.   HENT:      Head: Normocephalic and atraumatic.   Neurological:      Mental Status: She is alert and oriented to person, place, and time.             Laboratory  Lab Results   Component Value Date    WBC 3.46 (L) 01/18/2024    RBC 4.67 01/18/2024    HGB 14.4 01/18/2024    HCT 41.3 01/18/2024    MCV 88 01/18/2024    MCH 30.8 01/18/2024    MCHC 34.9 01/18/2024    RDW 12.1 01/18/2024     01/18/2024    MPV 9.1 (L) 01/18/2024    GRAN 3.8 08/01/2023    GRAN 66.4 08/01/2023    LYMPH 1.4 08/01/2023    LYMPH 23.8 08/01/2023    MONO 0.4 08/01/2023    MONO 6.7 08/01/2023    EOS 0.1 08/01/2023    BASO 0.04 08/01/2023    EOSINOPHIL 2.2 08/01/2023    BASOPHIL 0.7 08/01/2023       BMP  Lab Results   Component Value Date     01/18/2024    K 3.9 01/18/2024     01/18/2024    CO2 24 01/18/2024    BUN 15 01/18/2024    CREATININE 0.8 01/18/2024    CALCIUM 9.4 01/18/2024    ANIONGAP 12 01/18/2024    ESTGFRAFRICA >60 01/11/2022    EGFRNONAA >60 01/11/2022    AST 19 01/18/2024    ALT 16 01/18/2024    PROT 7.3 01/18/2024       No results found for: "BNP"    Lab Results   Component Value Date    TSH 1.163 01/18/2024       Lab " "Results   Component Value Date    SEDRATE 2 05/30/2024       Lab Results   Component Value Date    CRP 0.5 05/30/2024     No results found for: "IGE"     No results found for: "ASPERGILLUS"  No results found for: "AFUMIGATUSCL"     No results found for: "ACE"      Assessment/Plan:          Primary narcolepsy without cataplexy  Assessment & Plan:      3/20/2025   EPWORTH SLEEPINESS SCALE   Sitting and reading 1   Watching TV 1   Sitting, inactive in a public place (e.g. a theatre or a meeting) 1   As a passenger in a car for an hour without a break 2   Lying down to rest in the afternoon when circumstances permit 2   Sitting and talking to someone 0   Sitting quietly after a lunch without alcohol 1   In a car, while stopped for a few minutes in traffic 0   Total score 8        Take 3.75 g by mouth 2 (two) times a day. 1st dose: @ 8:30 pm, 3 g @ 12 MN - Oral        Concerta: Methyphenodate 18 mg on AM and 10 mg in PM  No medication side effects  Tolerating well   reviewed         Primary hypertension  Assessment & Plan:  On INDERAL            This note was prepared using voice recognition system and is likely to have sound alike errors that may have been overlooked even after proof reading.  Please call me with any questions    Discussed diagnosis, its evaluation, treatment and usual course. All questions answered.      Scott Rachel MD             "

## 2025-07-24 NOTE — ASSESSMENT & PLAN NOTE
3/20/2025   EPWORTH SLEEPINESS SCALE   Sitting and reading 1   Watching TV 1   Sitting, inactive in a public place (e.g. a theatre or a meeting) 1   As a passenger in a car for an hour without a break 2   Lying down to rest in the afternoon when circumstances permit 2   Sitting and talking to someone 0   Sitting quietly after a lunch without alcohol 1   In a car, while stopped for a few minutes in traffic 0   Total score 8        Take 3.75 g by mouth 2 (two) times a day. 1st dose: @ 8:30 pm, 3 g @ 12 MN - Oral        Concerta: Methyphenodate 18 mg on AM and 10 mg in PM  No medication side effects  Tolerating well   reviewed

## 2025-07-31 DIAGNOSIS — G47.419 PRIMARY NARCOLEPSY WITHOUT CATAPLEXY: ICD-10-CM

## 2025-08-01 ENCOUNTER — PATIENT MESSAGE (OUTPATIENT)
Dept: SLEEP MEDICINE | Facility: CLINIC | Age: 55
End: 2025-08-01
Payer: COMMERCIAL

## 2025-08-03 RX ORDER — METHYLPHENIDATE HYDROCHLORIDE 18 MG/1
18 TABLET ORAL EVERY MORNING
Qty: 90 TABLET | Refills: 0 | Status: SHIPPED | OUTPATIENT
Start: 2025-08-03 | End: 2025-11-03

## 2025-08-15 ENCOUNTER — OFFICE VISIT (OUTPATIENT)
Dept: DERMATOLOGY | Facility: CLINIC | Age: 55
End: 2025-08-15
Payer: COMMERCIAL

## 2025-08-15 DIAGNOSIS — L71.9 ROSACEA: Primary | ICD-10-CM

## 2025-08-15 PROCEDURE — 99999 PR PBB SHADOW E&M-EST. PATIENT-LVL III: CPT | Mod: PBBFAC,,, | Performed by: STUDENT IN AN ORGANIZED HEALTH CARE EDUCATION/TRAINING PROGRAM

## 2025-08-25 DIAGNOSIS — F41.9 ANXIETY: ICD-10-CM

## 2025-08-26 RX ORDER — PROPRANOLOL HYDROCHLORIDE 10 MG/1
10 TABLET ORAL 3 TIMES DAILY
Qty: 270 TABLET | Refills: 3 | Status: SHIPPED | OUTPATIENT
Start: 2025-08-26

## 2025-08-26 RX ORDER — FLUOXETINE HYDROCHLORIDE 40 MG/1
40 CAPSULE ORAL DAILY
Qty: 90 CAPSULE | Refills: 3 | Status: SHIPPED | OUTPATIENT
Start: 2025-08-26

## 2025-08-26 RX ORDER — OMEPRAZOLE 40 MG/1
40 CAPSULE, DELAYED RELEASE ORAL DAILY
Qty: 90 CAPSULE | Refills: 3 | Status: SHIPPED | OUTPATIENT
Start: 2025-08-26

## 2025-08-28 ENCOUNTER — PATIENT MESSAGE (OUTPATIENT)
Dept: SLEEP MEDICINE | Facility: CLINIC | Age: 55
End: 2025-08-28
Payer: COMMERCIAL

## 2025-08-28 DIAGNOSIS — G47.419 PRIMARY NARCOLEPSY WITHOUT CATAPLEXY: ICD-10-CM

## 2025-08-28 RX ORDER — METHYLPHENIDATE HYDROCHLORIDE 10 MG/1
10 TABLET ORAL 2 TIMES DAILY
Qty: 180 TABLET | Refills: 0 | Status: SHIPPED | OUTPATIENT
Start: 2025-08-28 | End: 2025-11-26

## 2025-09-02 ENCOUNTER — TELEPHONE (OUTPATIENT)
Dept: OPHTHALMOLOGY | Facility: CLINIC | Age: 55
End: 2025-09-02
Payer: COMMERCIAL